# Patient Record
Sex: FEMALE | Race: BLACK OR AFRICAN AMERICAN | Employment: FULL TIME | URBAN - METROPOLITAN AREA
[De-identification: names, ages, dates, MRNs, and addresses within clinical notes are randomized per-mention and may not be internally consistent; named-entity substitution may affect disease eponyms.]

---

## 2017-05-12 ENCOUNTER — OFFICE VISIT (OUTPATIENT)
Dept: INTERNAL MEDICINE CLINIC | Age: 37
End: 2017-05-12

## 2017-05-12 VITALS
TEMPERATURE: 97.9 F | RESPIRATION RATE: 14 BRPM | HEIGHT: 68 IN | HEART RATE: 49 BPM | OXYGEN SATURATION: 99 % | DIASTOLIC BLOOD PRESSURE: 64 MMHG | SYSTOLIC BLOOD PRESSURE: 117 MMHG | BODY MASS INDEX: 33.86 KG/M2 | WEIGHT: 223.4 LBS

## 2017-05-12 DIAGNOSIS — M75.81 RIGHT ROTATOR CUFF TENDINITIS: Primary | ICD-10-CM

## 2017-05-12 DIAGNOSIS — M75.51 ACUTE SHOULDER BURSITIS, RIGHT: ICD-10-CM

## 2017-05-12 RX ORDER — CHOLECALCIFEROL TAB 125 MCG (5000 UNIT) 125 MCG
TAB ORAL
COMMUNITY

## 2017-05-12 NOTE — MR AVS SNAPSHOT
Visit Information Date & Time Provider Department Dept. Phone Encounter #  
 5/12/2017 10:00 AM Karissa Rendon MD Internist of [address] [id number] Follow-up Instructions Return if symptoms worsen or fail to improve. Upcoming Health Maintenance Date Due DTaP/Tdap/Td series (1 - Tdap) 8/28/2001 PAP AKA CERVICAL CYTOLOGY 8/28/2001 INFLUENZA AGE 9 TO ADULT 8/1/2017 Allergies as of 5/12/2017  Review Complete On: 5/12/2017 By: August Knox Severity Noted Reaction Type Reactions Pcn [Penicillins] High 05/12/2017    Hives, Swelling, Other (comments) Throat closes Shellfish Derived High 05/12/2017    Hives, Swelling, Other (comments) Throat closes Current Immunizations  Never Reviewed No immunizations on file. Not reviewed this visit You Were Diagnosed With   
  
 Codes Comments Right rotator cuff tendinitis    -  Primary ICD-10-CM: M75.81 ICD-9-CM: 726.10 Acute shoulder bursitis, right     ICD-10-CM: M75.51 
ICD-9-CM: 726.10 Vitals BP Pulse Temp Resp Height(growth percentile) Weight(growth percentile)  
 117/64 (BP 1 Location: Left arm, BP Patient Position: Sitting) (!) 49 97.9 °F (36.6 °C) (Oral) 14 5' 7.72\" (1.72 m) 223 lb 6.4 oz (101.3 kg) SpO2 BMI OB Status Smoking Status 99% 34.25 kg/m2 IUD Never Smoker Vitals History BMI and BSA Data Body Mass Index Body Surface Area  
 34.25 kg/m 2 2.2 m 2 Your Updated Medication List  
  
   
This list is accurate as of: 5/12/17 10:58 AM.  Always use your most recent med list.  
  
  
  
  
 cholecalciferol (VITAMIN D3) 5,000 unit Tab tablet Commonly known as:  VITAMIN D3 Take  by mouth every Wednesday. We Performed the Following REFERRAL TO ORTHOPEDICS [ILW563 Custom] Follow-up Instructions Return if symptoms worsen or fail to improve. Referral Information Referral ID Referred By Referred To  
  
 [id number] Lovely Shaffer Not Available Visits Status Start Date End Date 1 New Request 5/12/17 5/12/18 If your referral has a status of pending review or denied, additional information will be sent to support the outcome of this decision. Patient Instructions Rash: Care Instructions Your Care Instructions A rash is any irritation or inflammation of the skin. Rashes have many possible causes, including allergy, infection, illness, heat, and emotional stress. Follow-up care is a key part of your treatment and safety. Be sure to make and go to all appointments, and call your doctor if you are having problems. Its also a good idea to know your test results and keep a list of the medicines you take. How can you care for yourself at home? · Wash the area with water only. Soap can make dryness and itching worse. Pat dry. · Put cold, wet cloths on the rash to reduce itching. · Keep cool, and stay out of the sun. · Leave the rash open to the air as much of the time as possible. · Sometimes petroleum jelly (Vaseline) can help relieve the discomfort caused by a rash. A moisturizing lotion, such as Cetaphil, also may help. Calamine lotion may help for rashes caused by contact with something (such as a plant or soap) that irritated the skin. Use it 3 or 4 times a day. · If your doctor prescribed a cream, use it as directed. If your doctor prescribed medicine, take it exactly as directed. · If your rash itches so badly that it interferes with your normal activities, take an over-the-counter antihistamine, such as diphenhydramine (Benadryl) or loratadine (Claritin). Read and follow all instructions on the label. When should you call for help? Call your doctor now or seek immediate medical care if: 
· You have signs of infection, such as: 
¨ Increased pain, swelling, warmth, or redness. ¨ Red streaks leading from the area. ¨ Pus draining from the area. ¨ A fever. · You have joint pain along with the rash. Watch closely for changes in your health, and be sure to contact your doctor if: 
· Your rash is changing or getting worse. For example, call if you have pain along with the rash, the rash is spreading, or you have new blisters. · You do not get better after 1 week. Where can you learn more? Go to http://petar-regis.info/. Enter T505 in the search box to learn more about \"Rash: Care Instructions. \" Current as of: October 13, 2016 Content Version: 11.2 © 5509-5884 [employer]. Care instructions adapted under license by [employer] (which disclaims liability or warranty for this information). If you have questions about a medical condition or this instruction, always ask your healthcare professional. [place] [address] any warranty or liability for your use of this information. Patient was given Medical Advance Directive form and understand to bring it back when it is complete Health Maintenance Due Topic Date Due  
 DTaP/Tdap/Td series (1 - Tdap) 08/28/2001  PAP AKA CERVICAL CYTOLOGY  08/28/2001 Costochondritis: Care Instructions Your Care Instructions You have chest pain because the cartilage of your rib cage is inflamed. This problem is called costochondritis. This type of chest wall pain may last from days to weeks. It is not a heart problem. Sometimes costochondritis occurs with a cold or the flu, and other times the exact cause is not known. Follow-up care is a key part of your treatment and safety. Be sure to make and go to all appointments, and call your doctor if you are having problems. Its also a good idea to know your test results and keep a list of the medicines you take. How can you care for yourself at home? · Take medicines for pain and inflammation exactly as directed. ¨ If the doctor gave you a prescription medicine, take it as prescribed. ¨ If you are not taking a prescription pain medicine, ask your doctor if you can take an over-the-counter medicine. ¨ Do not take two or more pain medicines at the same time unless the doctor told you to. Many pain medicines have acetaminophen, which is Tylenol. Too much acetaminophen (Tylenol) can be harmful. · It may help to use a warm compress or heating pad (set on low) on your chest. You can also try alternating heat and ice. Put ice or a cold pack on the area for 10 to 20 minutes at a time. Put a thin cloth between the ice and your skin. · Avoid any activity that strains the chest area. As your pain gets better, you can slowly return to your normal activities. · Do not use tape, an elastic bandage, a \"rib belt,\" or anything else that restricts your chest wall motion. When should you call for help? Call 911 anytime you think you may need emergency care. For example, call if: 
· You have new or different chest pain or pressure. This may occur with: ¨ Sweating. ¨ Shortness of breath. ¨ Nausea or vomiting. ¨ Pain that spreads from the chest to the neck, jaw, or one or both shoulders or arms. ¨ Dizziness or lightheadedness. ¨ A fast or uneven pulse. After calling 911, chew 1 adult-strength aspirin. Wait for an ambulance. Do not try to drive yourself. · You have severe trouble breathing. Call your doctor now or seek immediate medical care if: 
· You have a fever or cough. · You have any trouble breathing. · Your chest pain gets worse. Watch closely for changes in your health, and be sure to contact your doctor if: 
· Your chest pain continues even though you are taking anti-inflammatory medicine. · Your chest wall pain has not improved after 5 to 7 days. Where can you learn more? Go to http://petar-regis.info/. Enter E276 in the search box to learn more about \"Costochondritis: Care Instructions. \" 
 Current as of: May 27, 2016 Content Version: 11.2 © 1450-1882 [employer]. Care instructions adapted under license by [employer] (which disclaims liability or warranty for this information). If you have questions about a medical condition or this instruction, always ask your healthcare professional. [address] any warranty or liability for your use of this information. Introducing hospitals & HEALTH SERVICES! [facility] introduces [employer] patient portal. Now you can access parts of your medical record, email your doctor's office, and request medication refills online. 1. In your internet browser, go to [email or web address]://[email or web address]. [employer] 2. Click on the First Time User? Click Here link in the Sign In box. You will see the New Member Sign Up page. 3. Enter your [employer] Access Code exactly as it appears below. You will not need to use this code after youve completed the sign-up process. If you do not sign up before the expiration date, you must request a new code. · [employer] Access Code: [email or web address] Expires: 8/10/2017 10:08 AM 
 
4. Enter the last four digits of your Social Security Number (xxxx) and Date of Birth (mm/dd/yyyy) as indicated and click Submit. You will be taken to the next sign-up page. 5. Create a [employer] ID. This will be your [employer] login ID and cannot be changed, so think of one that is secure and easy to remember. 6. Create a [employer] password. You can change your password at any time. 7. Enter your Password Reset Question and Answer. This can be used at a later time if you forget your password. 8. Enter your e-mail address. You will receive e-mail notification when new information is available in [address] 9. Click Sign Up. You can now view and download portions of your medical record. 10. Click the Download Summary menu link to download a portable copy of your medical information. If you have questions, please visit the Frequently Asked Questions section of the [employer] website. Remember, [employer] is NOT to be used for urgent needs. For medical emergencies, dial 911. Now available from your iPhone and Android! Please provide this summary of care documentation to your next provider. Your primary care clinician is listed as Mic Castellanos. If you have any questions after today's visit, please call [phone].

## 2017-05-12 NOTE — PROGRESS NOTES
INTERNISTS OF [facility] [place]:  5/12/2017, MRN: [id number]      Ankita Sierra is a 39 y.o. female and presents to clinic for Establish Care and Skin Problem (Back of right shoulder x 1 month)    Subjective:   Pt is a 46yo female with no known medical problems. 1. Right Shoulder Pain: The pt reports right shoulder pain and swelling for 1 month. Pain radiates along the mid axillary line. She has no breast masses or breast tenderness. She has no known history of breast cancer in her family. She has no history of cancer along first-degree relatives. Patient reports that pain developed after working at her job. She lifts a lot of boxes at work. There are no alleviating factors are known. This is never happened before. 2. Health Maintenance: Last one was 1.5 yrs ago. She had an abnormal PAP smear during pregnancy. She had a biopsy. She was told that subsequent PAPs were unremarkable. There are no active problems to display for this patient. Current Outpatient Prescriptions   Medication Sig Dispense Refill    cholecalciferol, VITAMIN D3, (VITAMIN D3) 5,000 unit tab tablet Take  by mouth every Wednesday. Allergies   Allergen Reactions    Pcn [Penicillins] Hives, Swelling and Other (comments)     Throat closes    Shellfish Derived Hives, Swelling and Other (comments)     Throat closes       Past Medical History:   Diagnosis Date    Anemia     Chronic pain     knees and ankles    GERD (gastroesophageal reflux disease)     during pregancy    Headache     Hemorrhoid     Hypercholesterolemia     Migraine headache     Tendinitis        History reviewed. No pertinent surgical history.     Family History   Problem Relation Age of Onset    Diabetes Mother     Hypertension Mother     Bipolar Disorder Mother     Elevated Lipids Father     COPD Maternal Grandmother     Emphysema Maternal Grandmother     No Known Problems Maternal Grandfather     Stroke Paternal Grandmother     No Known Problems Paternal Grandfather     Allergic Rhinitis Daughter     Eczema Daughter     Headache Daughter        Social History   Substance Use Topics    Smoking status: Never Smoker    Smokeless tobacco: Never Used    Alcohol use Yes      Comment: rare       ROS   Review of Systems   Constitutional: Negative for chills and fever. HENT: Negative for ear pain and sore throat. Eyes: Negative for blurred vision and pain. Respiratory: Negative for cough. Cardiovascular: Negative for chest pain. Gastrointestinal: Negative for abdominal pain. Genitourinary: Negative for dysuria. Musculoskeletal: Positive for joint pain. Negative for myalgias. Skin: Negative for rash. Neurological: Negative for tingling, focal weakness and headaches. Endo/Heme/Allergies: Does not bruise/bleed easily. Objective     Vitals:    05/12/17 1012   BP: 117/64   Pulse: (!) 49   Resp: 14   Temp: 97.9 °F (36.6 °C)   TempSrc: Oral   SpO2: 99%   Weight: 223 lb 6.4 oz (101.3 kg)   Height: 5' 7.72\" (1.72 m)   PainSc:   0 - No pain       Physical Exam   Constitutional: She is oriented to person, place, and time and well-developed, well-nourished, and in no distress. HENT:   Head: Normocephalic and atraumatic. Right Ear: External ear normal.   Left Ear: External ear normal.   Nose: Nose normal.   Mouth/Throat: Oropharynx is clear and moist. No oropharyngeal exudate. Eyes: Conjunctivae and EOM are normal. Pupils are equal, round, and reactive to light. Right eye exhibits no discharge. Left eye exhibits no discharge. No scleral icterus. Neck: Neck supple. Cardiovascular: Normal rate, regular rhythm, normal heart sounds and intact distal pulses. Exam reveals no gallop and no friction rub. No murmur heard. Pulmonary/Chest: Effort normal and breath sounds normal. No respiratory distress. She has no wheezes. She has no rales. Abdominal: Soft. Bowel sounds are normal. She exhibits no distension. There is no tenderness.  There is no rebound and no guarding. Musculoskeletal: She exhibits no edema or tenderness (BUE are NTTP except along her right shoulder bursa - which is swollen along her superior posterior shoulder area). She has decreased range of motion with exercises just with her right shoulder. Lymphadenopathy:     She has no cervical adenopathy. Neurological: She is alert and oriented to person, place, and time. She exhibits normal muscle tone. Gait normal.   Skin: Skin is warm and dry. No erythema. Psychiatric: Affect normal.   Nursing note and vitals reviewed. Assessment/Plan:   1. Right rotator cuff tendinitis:   A referral was placed to orthopedics for evaluation of right rotator cuff tendinitis and suspected bursitis. The patient can take an over-the-counter NSAID for 7 days. She can also take Tylenol as needed for pain. Activity as tolerated. ORDERS:  - REFERRAL TO ORTHOPEDICS    2. Health Maintenance:   We are requesting her most recent Pap smear, previous PCP records, and her vaccination records      Health Maintenance Due   Topic Date Due    DTaP/Tdap/Td series (1 - Tdap) 08/28/2001    PAP AKA CERVICAL CYTOLOGY  08/28/2001         Lab review: no lab studies available for review at time of visit    I have discussed the diagnosis with the patient and the intended plan as seen in the above orders. The patient has received an after-visit summary and questions were answered concerning future plans. I have discussed medication side effects and warnings with the patient as well. I have reviewed the plan of care with the patient, accepted their input and they are in agreement with the treatment goals. All questions were answered. The patient understands the plan of care. Handouts provided today with above information. Pt instructed if symptoms worsen to call the office or report to the ED for continued care. Greater than 50% of the visit time was spent in counseling and/or coordination of care. Follow-up Disposition:  Return if symptoms worsen or fail to improve.     Joe Romo MD

## 2017-05-12 NOTE — PATIENT INSTRUCTIONS
Rash: Care Instructions  Your Care Instructions  A rash is any irritation or inflammation of the skin. Rashes have many possible causes, including allergy, infection, illness, heat, and emotional stress. Follow-up care is a key part of your treatment and safety. Be sure to make and go to all appointments, and call your doctor if you are having problems. Its also a good idea to know your test results and keep a list of the medicines you take. How can you care for yourself at home? · Wash the area with water only. Soap can make dryness and itching worse. Pat dry. · Put cold, wet cloths on the rash to reduce itching. · Keep cool, and stay out of the sun. · Leave the rash open to the air as much of the time as possible. · Sometimes petroleum jelly (Vaseline) can help relieve the discomfort caused by a rash. A moisturizing lotion, such as Cetaphil, also may help. Calamine lotion may help for rashes caused by contact with something (such as a plant or soap) that irritated the skin. Use it 3 or 4 times a day. · If your doctor prescribed a cream, use it as directed. If your doctor prescribed medicine, take it exactly as directed. · If your rash itches so badly that it interferes with your normal activities, take an over-the-counter antihistamine, such as diphenhydramine (Benadryl) or loratadine (Claritin). Read and follow all instructions on the label. When should you call for help? Call your doctor now or seek immediate medical care if:  · You have signs of infection, such as:  ¨ Increased pain, swelling, warmth, or redness. ¨ Red streaks leading from the area. ¨ Pus draining from the area. ¨ A fever. · You have joint pain along with the rash. Watch closely for changes in your health, and be sure to contact your doctor if:  · Your rash is changing or getting worse. For example, call if you have pain along with the rash, the rash is spreading, or you have new blisters.   · You do not get better after 1 week.  Where can you learn more? Go to http://petar-regis.info/. Enter B328 in the search box to learn more about \"Rash: Care Instructions. \"  Current as of: October 13, 2016  Content Version: 11.2  © 6600-7466 [employer]. Care instructions adapted under license by [employer] (which disclaims liability or warranty for this information). If you have questions about a medical condition or this instruction, always ask your healthcare professional. [place] [address] any warranty or liability for your use of this information. Patient was given Medical Advance Directive form and understand to bring it back when it is complete  Health Maintenance Due   Topic Date Due    DTaP/Tdap/Td series (1 - Tdap) 08/28/2001    PAP AKA CERVICAL CYTOLOGY  08/28/2001          Costochondritis: Care Instructions  Your Care Instructions  You have chest pain because the cartilage of your rib cage is inflamed. This problem is called costochondritis. This type of chest wall pain may last from days to weeks. It is not a heart problem. Sometimes costochondritis occurs with a cold or the flu, and other times the exact cause is not known. Follow-up care is a key part of your treatment and safety. Be sure to make and go to all appointments, and call your doctor if you are having problems. Its also a good idea to know your test results and keep a list of the medicines you take. How can you care for yourself at home? · Take medicines for pain and inflammation exactly as directed. ¨ If the doctor gave you a prescription medicine, take it as prescribed. ¨ If you are not taking a prescription pain medicine, ask your doctor if you can take an over-the-counter medicine. ¨ Do not take two or more pain medicines at the same time unless the doctor told you to. Many pain medicines have acetaminophen, which is Tylenol. Too much acetaminophen (Tylenol) can be harmful.   · It may help to use a warm compress or heating pad (set on low) on your chest. You can also try alternating heat and ice. Put ice or a cold pack on the area for 10 to 20 minutes at a time. Put a thin cloth between the ice and your skin. · Avoid any activity that strains the chest area. As your pain gets better, you can slowly return to your normal activities. · Do not use tape, an elastic bandage, a \"rib belt,\" or anything else that restricts your chest wall motion. When should you call for help? Call 911 anytime you think you may need emergency care. For example, call if:  · You have new or different chest pain or pressure. This may occur with:  ¨ Sweating. ¨ Shortness of breath. ¨ Nausea or vomiting. ¨ Pain that spreads from the chest to the neck, jaw, or one or both shoulders or arms. ¨ Dizziness or lightheadedness. ¨ A fast or uneven pulse. After calling 911, chew 1 adult-strength aspirin. Wait for an ambulance. Do not try to drive yourself. · You have severe trouble breathing. Call your doctor now or seek immediate medical care if:  · You have a fever or cough. · You have any trouble breathing. · Your chest pain gets worse. Watch closely for changes in your health, and be sure to contact your doctor if:  · Your chest pain continues even though you are taking anti-inflammatory medicine. · Your chest wall pain has not improved after 5 to 7 days. Where can you learn more? Go to http://petar-regis.info/. Enter E638 in the search box to learn more about \"Costochondritis: Care Instructions. \"  Current as of: May 27, 2016  Content Version: 11.2  © 2718-8100 [employer]. Care instructions adapted under license by [employer] (which disclaims liability or warranty for this information).  If you have questions about a medical condition or this instruction, always ask your healthcare professional. [address]  any warranty or liability for your use of this information.

## 2017-05-12 NOTE — PROGRESS NOTES
Chief Complaint   Patient presents with   [facility] Care    Skin Problem     Back of right shoulder x 1 month     1. Have you been to the ER, urgent care clinic since your last visit? Hospitalized since your last visit? Yes When: 5217 Where: [facility] Reason: Sliced left thumb    2. Have you seen or consulted any other health care providers outside of the [address] [place] [address] since your last visit? Include any pap smears or colon screening.  No    Patient was given Medical Advance Directive form and understand to bring it back when it is complete

## 2017-05-19 PROBLEM — Z87.42 HISTORY OF ABNORMAL CERVICAL PAP SMEAR: Status: ACTIVE | Noted: 2017-05-19

## 2017-06-07 ENCOUNTER — OFFICE VISIT (OUTPATIENT)
Dept: ORTHOPEDIC SURGERY | Age: 37
End: 2017-06-07

## 2017-06-07 VITALS
HEIGHT: 68 IN | WEIGHT: 217.4 LBS | TEMPERATURE: 97.2 F | BODY MASS INDEX: 32.95 KG/M2 | SYSTOLIC BLOOD PRESSURE: 116 MMHG | DIASTOLIC BLOOD PRESSURE: 78 MMHG | HEART RATE: 60 BPM

## 2017-06-07 DIAGNOSIS — M25.511 RIGHT SHOULDER PAIN, UNSPECIFIED CHRONICITY: ICD-10-CM

## 2017-06-07 DIAGNOSIS — D17.21 LIPOMA OF RIGHT SHOULDER: Primary | ICD-10-CM

## 2017-06-07 NOTE — PROGRESS NOTES
Maryellen Chin  1980   Chief Complaint   Patient presents with    Shoulder Pain     Right        HISTORY OF PRESENT ILLNESS  Maryellen Chin is a 39 y.o. female who presents today for evaluation of right shoulder pain. She rates her pain 4/10 today. Patient reports that she noticed a bump in her right shoulder last week. She works at [employer] and has to do a lot of heavy lifting throughout the day. Allergies   Allergen Reactions    Pcn [Penicillins] Hives, Swelling and Other (comments)     Throat closes    Shellfish Derived Hives, Swelling and Other (comments)     Throat closes        Past Medical History:   Diagnosis Date    Anemia     Chronic pain     knees and ankles    GERD (gastroesophageal reflux disease)     during pregancy    Headache     Hemorrhoid     Hypercholesterolemia     Migraine headache     Tendinitis       Social History     Social History    Marital status: UNKNOWN     Spouse name: N/A    Number of children: N/A    Years of education: N/A     Occupational History    Not on file. Social History Main Topics    Smoking status: Never Smoker    Smokeless tobacco: Never Used    Alcohol use Yes      Comment: rare    Drug use: Not on file    Sexual activity: Not on file     Other Topics Concern    Not on file     Social History Narrative      History reviewed. No pertinent surgical history. Family History   Problem Relation Age of Onset    Diabetes Mother     Hypertension Mother     Bipolar Disorder Mother     Elevated Lipids Father     COPD Maternal Grandmother     Emphysema Maternal Grandmother     No Known Problems Maternal Grandfather     Stroke Paternal Grandmother     No Known Problems Paternal Grandfather     Allergic Rhinitis Daughter     Eczema Daughter     Headache Daughter       Current Outpatient Prescriptions   Medication Sig    cholecalciferol, VITAMIN D3, (VITAMIN D3) 5,000 unit tab tablet Take  by mouth every Wednesday.      No current facility-administered medications for this visit. REVIEW OF SYSTEM   Patient denies: Weight loss, Fever/Chills, HA, Visual changes, Fatigue, Chest pain, SOB, Abdominal pain, N/V/D/C, Blood in stool or urine, Edema. Pertinent positive as above in HPI. All others were negative    PHYSICAL EXAM:   Visit Vitals    /78    Pulse 60    Temp 97.2 °F (36.2 °C) (Oral)    Ht 5' 8\" (1.727 m)    Wt 217 lb 6.4 oz (98.6 kg)    BMI 33.06 kg/m2     The patient is a well-developed, well-nourished female   in no acute distress. The patient is alert and oriented times three. The patient is alert and oriented times three. Mood and affect are normal.  LYMPHATIC: lymph nodes are not enlarged and are within normal limits  SKIN: normal in color and non tender to palpation. There are no bruises or abrasions noted. NEUROLOGICAL: Motor sensory exam is within normal limits. Reflexes are equal bilaterally. There is normal sensation to pinprick and light touch  MUSCULOSKELETAL:  Examination Right shoulder   Skin Intact   AC joint tenderness -   Biceps tenderness -   Forward flexion/Elevation    Active abduction    Glenohumeral abduction 90   External rotation ROM 90   Internal rotation ROM 70   Apprehension -   Lilianas Relocation -   Jerk -   Load and Shift -   Obriens -   Speeds -   Impingement sign -   Supraspinatus/Empty Can -, 5/5   External Rotation Strength -, 5/5   Lift Off/Belly Press -, 5/5   Neurovascular Intact   Soft tissue mass seen on ultrasound, solid, no fluid    IMAGING: XR of the right shoulder dated 6/7/17 was reviewed and read: No soft tissue calcification noted. IMPRESSION:      ICD-10-CM ICD-9-CM    1. Lipoma of right shoulder D17.21 214.8 MRI SHOULDER RT WO CONT      US EXT NONVAS RT COMP   2.  Right shoulder pain, unspecified chronicity M25.511 719.41 AMB POC XRAY, SHOULDER; COMPLETE, 2+      MRI SHOULDER RT WO CONT      US EXT NONVAS RT COMP        PLAN: Patient is complaining of a cyst over the right shoulder. I examined this mass under ultrasound. We will proceed with an MRI of the right shoulder to r/o a lipoma. I will see her back following completion of the MRI. Follow-up Disposition: Not on File    Scribed by Chio Salazar [facility]) as dictated by Paulette Ryan MD    I, Dr. Paulette Ryan, confirm that all documentation is accurate.     Paulette Ryan M.D.   [facility]

## 2017-06-07 NOTE — PATIENT INSTRUCTIONS
Joint Pain: Care Instructions  Your Care Instructions  Many people have small aches and pains from overuse or injury to muscles and joints. Joint injuries often happen during sports or recreation, work tasks, or projects around the home. An overuse injury can happen when you put too much stress on a joint or when you do an activity that stresses the joint over and over, such as using the computer or rowing a boat. You can take action at home to help your muscles and joints get better. You should feel better in 1 to 2 weeks, but it can take 3 months or more to heal completely. Follow-up care is a key part of your treatment and safety. Be sure to make and go to all appointments, and call your doctor if you are having problems. It's also a good idea to know your test results and keep a list of the medicines you take. How can you care for yourself at home? · Do not put weight on the injured joint for at least a day or two. · For the first day or two after an injury, do not take hot showers or baths, and do not use hot packs. The heat could make swelling worse. · Put ice or a cold pack on the sore joint for 10 to 20 minutes at a time. Try to do this every 1 to 2 hours for the next 3 days (when you are awake) or until the swelling goes down. Put a thin cloth between the ice and your skin. · Wrap the injury in an elastic bandage. Do not wrap it too tightly because this can cause more swelling. · Prop up the sore joint on a pillow when you ice it or anytime you sit or lie down during the next 3 days. Try to keep it above the level of your heart. This will help reduce swelling. · Take an over-the-counter pain medicine, such as acetaminophen (Tylenol), ibuprofen (Advil, Motrin), or naproxen (Aleve). Read and follow all instructions on the label. · After 1 or 2 days of rest, begin moving the joint gently.  While the joint is still healing, you can begin to exercise using activities that do not strain or hurt the painful joint. When should you call for help? Call your doctor now or seek immediate medical care if:  · You have signs of infection, such as:  ¨ Increased pain, swelling, warmth, and redness. ¨ Red streaks leading from the joint. ¨ A fever. Watch closely for changes in your health, and be sure to contact your doctor if:  · Your movement or symptoms are not getting better after 1 to 2 weeks of home treatment. Where can you learn more? Go to http://petar-regis.info/. Enter P205 in the search box to learn more about \"Joint Pain: Care Instructions. \"  Current as of: May 23, 2016  Content Version: 11.2  © 6821-0229 [employer]. Care instructions adapted under license by [employer] (which disclaims liability or warranty for this information). If you have questions about a medical condition or this instruction, always ask your healthcare professional. [address] any warranty or liability for your use of this information.

## 2017-06-11 PROBLEM — K75.81 NASH (NONALCOHOLIC STEATOHEPATITIS): Status: ACTIVE | Noted: 2017-06-11

## 2018-04-26 ENCOUNTER — HOSPITAL ENCOUNTER (EMERGENCY)
Age: 38
Discharge: HOME | End: 2018-04-26
Attending: EMERGENCY MEDICINE
Payer: COMMERCIAL

## 2018-04-26 VITALS
RESPIRATION RATE: 18 BRPM | OXYGEN SATURATION: 98 % | DIASTOLIC BLOOD PRESSURE: 65 MMHG | BODY MASS INDEX: 32.58 KG/M2 | WEIGHT: 215 LBS | HEIGHT: 68 IN | HEART RATE: 73 BPM | SYSTOLIC BLOOD PRESSURE: 104 MMHG | TEMPERATURE: 98.2 F

## 2018-04-26 DIAGNOSIS — M25.571 RIGHT ANKLE PAIN, UNSPECIFIED CHRONICITY: Primary | ICD-10-CM

## 2018-04-26 LAB — D DIMER PPP FEU-MCNC: 0.33 UG/ML(FEU)

## 2018-04-26 PROCEDURE — 99283 EMERGENCY DEPT VISIT LOW MDM: CPT

## 2018-04-26 PROCEDURE — 85379 FIBRIN DEGRADATION QUANT: CPT

## 2018-04-26 RX ORDER — NAPROXEN 500 MG/1
500 TABLET ORAL 2 TIMES DAILY WITH MEALS
Qty: 14 TAB | Refills: 0 | Status: SHIPPED | OUTPATIENT
Start: 2018-04-26 | End: 2018-09-04 | Stop reason: SDUPTHER

## 2018-04-26 NOTE — DISCHARGE INSTRUCTIONS
Joint Pain: Care Instructions  Your Care Instructions    Many people have small aches and pains from overuse or injury to muscles and joints. Joint injuries often happen during sports or recreation, work tasks, or projects around the home. An overuse injury can happen when you put too much stress on a joint or when you do an activity that stresses the joint over and over, such as using the computer or rowing a boat. You can take action at home to help your muscles and joints get better. You should feel better in 1 to 2 weeks, but it can take 3 months or more to heal completely. Follow-up care is a key part of your treatment and safety. Be sure to make and go to all appointments, and call your doctor if you are having problems. It's also a good idea to know your test results and keep a list of the medicines you take. How can you care for yourself at home? · Do not put weight on the injured joint for at least a day or two. · For the first day or two after an injury, do not take hot showers or baths, and do not use hot packs. The heat could make swelling worse. · Put ice or a cold pack on the sore joint for 10 to 20 minutes at a time. Try to do this every 1 to 2 hours for the next 3 days (when you are awake) or until the swelling goes down. Put a thin cloth between the ice and your skin. · Wrap the injury in an elastic bandage. Do not wrap it too tightly because this can cause more swelling. · Prop up the sore joint on a pillow when you ice it or anytime you sit or lie down during the next 3 days. Try to keep it above the level of your heart. This will help reduce swelling. · Take an over-the-counter pain medicine, such as acetaminophen (Tylenol), ibuprofen (Advil, Motrin), or naproxen (Aleve). Read and follow all instructions on the label. · After 1 or 2 days of rest, begin moving the joint gently.  While the joint is still healing, you can begin to exercise using activities that do not strain or hurt the painful joint. When should you call for help? Call your doctor now or seek immediate medical care if:  ? · You have signs of infection, such as:  ¨ Increased pain, swelling, warmth, and redness. ¨ Red streaks leading from the joint. ¨ A fever. ? Watch closely for changes in your health, and be sure to contact your doctor if:  ? · Your movement or symptoms are not getting better after 1 to 2 weeks of home treatment. Where can you learn more? Go to http://petar-regis.info/. Enter P205 in the search box to learn more about \"Joint Pain: Care Instructions. \"  Current as of: 2017  Content Version: 11.4  © 8242-5460 [employer]. Care instructions adapted under license by [employer] (which disclaims liability or warranty for this information). If you have questions about a medical condition or this instruction, always ask your healthcare professional. [place] [address] any warranty or liability for your use of this information. Oc[employer]ch Activation    Thank you for requesting access to [employer]. Please follow the instructions below to securely access and download your online medical record. O[employer] allows you to send messages to your doctor, view your test results, renew your prescriptions, schedule appointments, and more. How Do I Sign Up? 1. In your internet browser, go to [email or web address]  2. Click on the First Time User? Click Here link in the Sign In box. You will be redirect to the New Member Sign Up page. 3. Enter your O[employer] Access Code exactly as it appears below. You will not need to use this code after youve completed the sign-up process. If you do not sign up before the expiration date, you must request a new code. Oc[employer]ch Access Code: [email or web address]  Expires: 2018  5:47 PM (This is the date your O[employer] access code will )    4.  Enter the last four digits of your Social Security Number (xxxx) and Date of Birth (mm/dd/yyyy) as indicated and click Submit. You will be taken to the next sign-up page. 5. Create a Base[employer] ID. This will be your [employer] login ID and cannot be changed, so think of one that is secure and easy to remember. 6. Create a [employer] password. You can change your password at any time. 7. Enter your Password Reset Question and Answer. This can be used at a later time if you forget your password. 8. Enter your e-mail address. You will receive e-mail notification when new information is available in [address] 9. Click Sign Up. You can now view and download portions of your medical record. 10. Click the Download Summary menu link to download a portable copy of your medical information. Additional Information    If you have questions, please visit the Frequently Asked Questions section of the [employer] website at [email or web address] Remember, [employer] is NOT to be used for urgent needs. For medical emergencies, dial 911.

## 2018-04-26 NOTE — ED PROVIDER NOTES
EMERGENCY DEPARTMENT HISTORY AND PHYSICAL EXAM    6:41 PM      Date: 4/26/2018  Patient Name: Aura Campbell    History of Presenting Illness     Chief Complaint   Patient presents with    Foot Injury         History Provided By: Patient    Chief Complaint: was seen at the [facility] 3 weeks ago for similar R ankle pain and swelling and \" they did a CT scan on me an told I have a torn tendon and gave me referral to see a [place] orthopedic doctor which I will be seeing later next month\"   Is here today because noticed swelling but no chest pain, SOB, fever, chills, palpitation of hx of prior blood clot. Currently has IUD. Duration:  Weeks  Timing:  Gradual  Location: R ankle  Quality: Aching and swelling  Severity: Moderate  Modifying Factors: none  Associated Symptoms: denies any other associated signs or symptoms      Additional History (Context): Aura Campbell is a 40 y.o. female with No significant past medical history who presents with sx as noted above. PCP: Amada Pittman MD    Current Outpatient Prescriptions   Medication Sig Dispense Refill    naproxen (NAPROSYN) 500 mg tablet Take 1 Tab by mouth two (2) times daily (with meals). 14 Tab 0    cholecalciferol, VITAMIN D3, (VITAMIN D3) 5,000 unit tab tablet Take  by mouth every Wednesday. Past History     Past Medical History:  Past Medical History:   Diagnosis Date    Anemia     Chronic pain     knees and ankles    GERD (gastroesophageal reflux disease)     during pregancy    Headache     Hemorrhoid     Hypercholesterolemia     Migraine headache     SIFUENTES (nonalcoholic steatohepatitis) (per outside records) 6/11/2017    Tendinitis        Past Surgical History:  No past surgical history on file.     Family History:  Family History   Problem Relation Age of Onset    Diabetes Mother     Hypertension Mother     Bipolar Disorder Mother     Elevated Lipids Father     COPD Maternal Grandmother     Emphysema Maternal Grandmother     No Known Problems Maternal Grandfather     Stroke Paternal Grandmother     No Known Problems Paternal Grandfather     Allergic Rhinitis Daughter     Eczema Daughter     Headache Daughter        Social History:  Social History   Substance Use Topics    Smoking status: Never Smoker    Smokeless tobacco: Never Used    Alcohol use Yes      Comment: rare       Allergies: Allergies   Allergen Reactions    Pcn [Penicillins] Hives, Swelling and Other (comments)     Throat closes    Shellfish Derived Hives, Swelling and Other (comments)     Throat closes         Review of Systems       Review of Systems   Respiratory: Negative for chest tightness and shortness of breath. Cardiovascular: Negative for chest pain. Musculoskeletal:        L ankle pain and swelling x 3 weeks. All other systems reviewed and are negative. Physical Exam     Visit Vitals    /81 (BP 1 Location: Left arm)    Pulse 64    Temp 98.2 °F (36.8 °C)    Resp 18    Ht 5' 8\" (1.727 m)    Wt 97.5 kg (215 lb)    SpO2 99%    BMI 32.69 kg/m2         Physical Exam   Constitutional: She is oriented to person, place, and time. She appears well-developed and well-nourished. HENT:   Head: Normocephalic and atraumatic. Eyes: Conjunctivae and EOM are normal.   Neck: Normal range of motion. Cardiovascular: Normal rate, regular rhythm and normal heart sounds. Pulmonary/Chest: Effort normal and breath sounds normal. No respiratory distress. She has no wheezes. She has no rales. She exhibits no tenderness. Abdominal: Soft. Bowel sounds are normal. She exhibits no distension. There is no tenderness. There is no rebound and no guarding. Musculoskeletal: Normal range of motion. Legs:  Neurological: She is alert and oriented to person, place, and time. Skin: Skin is warm. She is not diaphoretic. Psychiatric: She has a normal mood and affect.  Her behavior is normal. Judgment and thought content normal.         Diagnostic Study Results     Labs -  Recent Results (from the past 12 hour(s))   D DIMER    Collection Time: 04/26/18  6:18 PM   Result Value Ref Range    D DIMER 0.33 <0.46 ug/ml(FEU)     Radiologic Studies -   No orders to display         Medical Decision Making   I am the first provider for this patient. I reviewed the vital signs, available nursing notes, past medical history, past surgical history, family history and social history. Vital Signs-Reviewed the patient's vital signs. Provider Notes (Medical Decision Making): With hx of injury and being on IUD has risk factor for clot so d dimer was ordered. Negative. Placed on crutches and no imaging ordered since already had CT at [facility].   Will give referral to see orthopedic civilian. Given Crutches. Discharge. Spoke to patient and mother and explained the treatment plan and the test done. Diagnosis     Clinical Impression:   1. Right ankle pain, unspecified chronicity        Disposition: HOME. Follow-up Information     Follow up With Details Comments Wen Barbour. 60., MD In 1 day  [place] [address]  [phone] [address] [facility] EMERGENCY DEPT  As needed [address]    Shayan Membreno MD Schedule an appointment as soon as possible for a visit  [address] [place] [id number] [phone]             Patient's Medications   Start Taking    NAPROXEN (NAPROSYN) 500 MG TABLET    Take 1 Tab by mouth two (2) times daily (with meals). Continue Taking    CHOLECALCIFEROL, VITAMIN D3, (VITAMIN D3) 5,000 UNIT TAB TABLET    Take  by mouth every Wednesday.    These Medications have changed    No medications on file   Stop Taking    No medications on file

## 2018-04-26 NOTE — LETTER
NOTIFICATION RETURN TO WORK / SCHOOL 
 
4/26/2018 7:15 PM 
 
Ms. Ashley Yoo [address] [place] [address] To Whom It May Concern: 
 
Ashley Yoo is currently under the care of [address] [facility] EMERGENCY DEPT. She will return to work/school on: Saturday, April 28, 2018. Must use crutches until cleared by orthopedist.  
 
If there are questions or concerns please have the patient contact our office. Sincerely, Alcides MARINO/ ESTELLE Jarrett

## 2018-05-15 ENCOUNTER — OFFICE VISIT (OUTPATIENT)
Dept: ORTHOPEDIC SURGERY | Age: 38
End: 2018-05-15

## 2018-05-15 VITALS
RESPIRATION RATE: 16 BRPM | DIASTOLIC BLOOD PRESSURE: 82 MMHG | HEIGHT: 68 IN | OXYGEN SATURATION: 100 % | TEMPERATURE: 98.2 F | WEIGHT: 228.9 LBS | HEART RATE: 62 BPM | SYSTOLIC BLOOD PRESSURE: 121 MMHG | BODY MASS INDEX: 34.69 KG/M2

## 2018-05-15 DIAGNOSIS — Z01.810 PRE-OPERATIVE CARDIOVASCULAR EXAMINATION: ICD-10-CM

## 2018-05-15 DIAGNOSIS — Z01.811 PRE-OPERATIVE RESPIRATORY EXAMINATION: ICD-10-CM

## 2018-05-15 DIAGNOSIS — S86.311A TEAR OF PERONEAL TENDON, RIGHT, INITIAL ENCOUNTER: Primary | ICD-10-CM

## 2018-05-15 DIAGNOSIS — Z01.812 BLOOD TESTS PRIOR TO TREATMENT OR PROCEDURE: ICD-10-CM

## 2018-05-15 DIAGNOSIS — Z01.818 PREOP EXAMINATION: ICD-10-CM

## 2018-05-15 DIAGNOSIS — M76.71 TENDINITIS OF RIGHT PERONEUS BREVIS TENDON: ICD-10-CM

## 2018-05-15 RX ORDER — BISMUTH SUBSALICYLATE 262 MG
1 TABLET,CHEWABLE ORAL DAILY
COMMUNITY

## 2018-05-15 NOTE — LETTER
NOTIFICATION RETURN TO WORK / SCHOOL 
 
5/15/2018 9:02 AM 
 
Ms. Alejandro Dickerson [address] [address] To Whom It May Concern: 
 
Alejandro Dickerson is currently under the care of [address] [place] [address] Elvis Veronica. Please allow Ms. Bautista to remain out of work until further notice. She is under our care for peroneal tendinitis, surgery pending. If there are questions or concerns please have the patient contact our office.  
 
 
 
Sincerely, 
 
 
Jolene Abdalla MD

## 2018-05-15 NOTE — PATIENT INSTRUCTIONS
Please follow up with Xi Abbott. You are advised to contact us if your condition worsens. Plantar Fasciitis: Exercises  Your Care Instructions  Here are some examples of typical rehabilitation exercises for your condition. Start each exercise slowly. Ease off the exercise if you start to have pain. Your doctor or physical therapist will tell you when you can start these exercises and which ones will work best for you. How to do the exercises  Towel stretch    1. Sit with your legs extended and knees straight. 2. Place a towel around your foot just under the toes. 3. Hold each end of the towel in each hand, with your hands above your knees. 4. Pull back with the towel so that your foot stretches toward you. 5. Hold the position for at least 15 to 30 seconds. 6. Repeat 2 to 4 times a session, up to 5 sessions a day. Calf stretch    This exercise stretches the muscles at the back of the lower leg (the calf) and the Achilles tendon. Do this exercise 3 or 4 times a day, 5 days a week. 1. Stand facing a wall with your hands on the wall at about eye level. Put the leg you want to stretch about a step behind your other leg. 2. Keeping your back heel on the floor, bend your front knee until you feel a stretch in the back leg. 3. Hold the stretch for 15 to 30 seconds. Repeat 2 to 4 times. Plantar fascia and calf stretch    Stretching the plantar fascia and calf muscles can increase flexibility and decrease heel pain. You can do this exercise several times each day and before and after activity. 1. Stand on a step as shown above. Be sure to hold on to the banister. 2. Slowly let your heels down over the edge of the step as you relax your calf muscles. You should feel a gentle stretch across the bottom of your foot and up the back of your leg to your knee. 3. Hold the stretch about 15 to 30 seconds, and then tighten your calf muscle a little to bring your heel back up to the level of the step.  Repeat 2 to 4 times. Towel curls    Make this exercise more challenging by placing a weighted object, such as a soup can, on the other end of the towel. 1. While sitting, place your foot on a towel on the floor and scrunch the towel toward you with your toes. 2. Then, also using your toes, push the towel away from you. Welch pickups    1. Put marbles on the floor next to a cup.  2. Using your toes, try to lift the marbles up from the floor and put them in the cup. Follow-up care is a key part of your treatment and safety. Be sure to make and go to all appointments, and call your doctor if you are having problems. It's also a good idea to know your test results and keep a list of the medicines you take. Where can you learn more? Go to http://petar-regis.info/. Doc Alvarado in the search box to learn more about \"Plantar Fasciitis: Exercises. \"  Current as of: March 21, 2017  Content Version: 11.4  © 5908-2664 [employer]wise, Inc[employer]. Care instructions adapted under license by [employer] (which disclaims liability or warranty for this information). If you have questions about a medical condition or this instruction, always ask your healthcare professional. [address] any warranty or liability for your use of this information.

## 2018-05-15 NOTE — MR AVS SNAPSHOT
[address] [place] [address], [address] 200 [facility] 
[phone] Patient: Taylor Rodriguez MRN: [id number] VQQ:[id number] Visit Information Date & Time Provider Department Dept. Phone Encounter #  
 5/15/2018  8:30 AM Kimporsha Frye, [address] [facility] [phone] [id number] Upcoming Health Maintenance Date Due DTaP/Tdap/Td series (1 - Tdap) 8/28/2001 PAP AKA CERVICAL CYTOLOGY 8/28/2001 Influenza Age 5 to Adult 8/1/2018 Allergies as of 5/15/2018  Review Complete On: 4/26/2018 By: Diego Becker RN Severity Noted Reaction Type Reactions Pcn [Penicillins] High 05/12/2017    Hives, Swelling, Other (comments) Throat closes Shellfish Derived High 05/12/2017    Hives, Swelling, Other (comments) Throat closes Current Immunizations  Never Reviewed No immunizations on file. Not reviewed this visit You Were Diagnosed With   
  
 Codes Comments Pre-operative cardiovascular examination    -  Primary ICD-10-CM: Z01.810 ICD-9-CM: V72.81 Pre-operative respiratory examination     ICD-10-CM: O49.850 ICD-9-CM: V72.82 Blood tests prior to treatment or procedure     ICD-10-CM: Z01.812 ICD-9-CM: V72.63 Preop examination     ICD-10-CM: R26.667 ICD-9-CM: V72.84 Vitals BP Pulse Temp Resp Height(growth percentile) Weight(growth percentile) 121/82 62 98.2 °F (36.8 °C) (Oral) 16 5' 8\" (1.727 m) 228 lb 14.4 oz (103.8 kg) SpO2 BMI OB Status Smoking Status 100% 34.8 kg/m2 IUD Never Smoker BMI and BSA Data Body Mass Index Body Surface Area 34.8 kg/m 2 2.23 m 2 Your Updated Medication List  
  
   
This list is accurate as of 5/15/18  9:04 AM.  Always use your most recent med list.  
  
  
  
  
 cholecalciferol (VITAMIN D3) 5,000 unit Tab tablet Commonly known as:  VITAMIN D3 Take  by mouth every Wednesday. multivitamin tablet Commonly known as:  ONE A DAY Take 1 Tab by mouth daily. naproxen 500 mg tablet Commonly known as:  NAPROSYN Take 1 Tab by mouth two (2) times daily (with meals). Patient Instructions Please follow up with Mesfin Esqueda. You are advised to contact us if your condition worsens. Plantar Fasciitis: Exercises Your Care Instructions Here are some examples of typical rehabilitation exercises for your condition. Start each exercise slowly. Ease off the exercise if you start to have pain. Your doctor or physical therapist will tell you when you can start these exercises and which ones will work best for you. How to do the exercises Towel stretch 1. Sit with your legs extended and knees straight. 2. Place a towel around your foot just under the toes. 3. Hold each end of the towel in each hand, with your hands above your knees. 4. Pull back with the towel so that your foot stretches toward you. 5. Hold the position for at least 15 to 30 seconds. 6. Repeat 2 to 4 times a session, up to 5 sessions a day. Calf stretch This exercise stretches the muscles at the back of the lower leg (the calf) and the Achilles tendon. Do this exercise 3 or 4 times a day, 5 days a week. 1. Stand facing a wall with your hands on the wall at about eye level. Put the leg you want to stretch about a step behind your other leg. 2. Keeping your back heel on the floor, bend your front knee until you feel a stretch in the back leg. 3. Hold the stretch for 15 to 30 seconds. Repeat 2 to 4 times. Plantar fascia and calf stretch Stretching the plantar fascia and calf muscles can increase flexibility and decrease heel pain. You can do this exercise several times each day and before and after activity. 1. Stand on a step as shown above. Be sure to hold on to the banister.  
2. Slowly let your heels down over the edge of the step as you relax your calf muscles. You should feel a gentle stretch across the bottom of your foot and up the back of your leg to your knee. 3. Hold the stretch about 15 to 30 seconds, and then tighten your calf muscle a little to bring your heel back up to the level of the step. Repeat 2 to 4 times. Towel curls Make this exercise more challenging by placing a weighted object, such as a soup can, on the other end of the towel. 1. While sitting, place your foot on a towel on the floor and scrunch the towel toward you with your toes. 2. Then, also using your toes, push the towel away from you. Merkel pickups 1. Put marbles on the floor next to a cup. 
2. Using your toes, try to lift the marbles up from the floor and put them in the cup. Follow-up care is a key part of your treatment and safety. Be sure to make and go to all appointments, and call your doctor if you are having problems. It's also a good idea to know your test results and keep a list of the medicines you take. Where can you learn more? Go to http://[employer] naturalregis.info/. Cheyennealexandra Abimbola in the search box to learn more about \"Plantar Fasciitis: Exercises. \" Current as of: March 21, 2017 Content Version: 11.4 © 8127-7147 Healthwise, Incorporated. Care instructions adapted under license by [employer] (which disclaims liability or warranty for this information). If you have questions about a medical condition or this instruction, always ask your healthcare professional. [place] [address] any warranty or liability for your use of this information. Introducing [facility]! [employer] introduces [facility] [facility] patient portal. Now you can access parts of your medical record, email your doctor's office, and request medication refills online. 1. In your internet browser, go to [email or web address]. [employer] 2. Click on the First Time User? Click Here link in the Sign In box.  You will see the New Member Sign Up page. 3. Enter your [employer] Access Code exactly as it appears below. You will not need to use this code after youve completed the sign-up process. If you do not sign up before the expiration date, you must request a new code. · [employer] Access Code: [email or web address] Expires: 7/25/2018  5:47 PM 
 
4. Enter the last four digits of your Social Security Number (xxxx) and Date of Birth (mm/dd/yyyy) as indicated and click Submit. You will be taken to the next sign-up page. 5. Create a Sk[employer] ID. This will be your [employer] login ID and cannot be changed, so think of one that is secure and easy to remember. 6. Create a [employer] password. You can change your password at any time. 7. Enter your Password Reset Question and Answer. This can be used at a later time if you forget your password. 8. Enter your e-mail address. You will receive e-mail notification when new information is available in [address]. 9. Click Sign Up. You can now view and download portions of your medical record. 10. Click the Download Summary menu link to download a portable copy of your medical information. If you have questions, please visit the Frequently Asked Questions section of the [employer] website. Remember, [employer] is NOT to be used for urgent needs. For medical emergencies, dial 911. Now available from your iPhone and Android! Please provide this summary of care documentation to your next provider. Your primary care clinician is listed as Javier Israel. If you have any questions after today's visit, please call [phone].

## 2018-05-18 ENCOUNTER — DOCUMENTATION ONLY (OUTPATIENT)
Dept: ORTHOPEDIC SURGERY | Age: 38
End: 2018-05-18

## 2018-05-18 NOTE — PROGRESS NOTES
[facility] Medical Request Form received via fax and put in forms bin at [facility]. Fax back to [phone].

## 2018-05-21 ENCOUNTER — HOSPITAL ENCOUNTER (OUTPATIENT)
Dept: LAB | Age: 38
Discharge: HOME | End: 2018-05-21
Payer: COMMERCIAL

## 2018-05-21 ENCOUNTER — HOSPITAL ENCOUNTER (OUTPATIENT)
Dept: GENERAL RADIOLOGY | Age: 38
Discharge: HOME | End: 2018-05-21
Payer: COMMERCIAL

## 2018-05-21 DIAGNOSIS — Z01.812 BLOOD TESTS PRIOR TO TREATMENT OR PROCEDURE: ICD-10-CM

## 2018-05-21 DIAGNOSIS — Z01.818 PREOP EXAMINATION: ICD-10-CM

## 2018-05-21 DIAGNOSIS — Z01.810 PRE-OPERATIVE CARDIOVASCULAR EXAMINATION: ICD-10-CM

## 2018-05-21 DIAGNOSIS — Z01.811 PRE-OPERATIVE RESPIRATORY EXAMINATION: ICD-10-CM

## 2018-05-21 LAB
ALBUMIN SERPL-MCNC: 3.8 G/DL (ref 3.4–5)
ALBUMIN/GLOB SERPL: 1.1 {RATIO} (ref 0.8–1.7)
ALP SERPL-CCNC: 96 U/L (ref 45–117)
ALT SERPL-CCNC: 30 U/L (ref 13–56)
AMPHET UR QL SCN: NEGATIVE
ANION GAP SERPL CALC-SCNC: 9 MMOL/L (ref 3–18)
APPEARANCE UR: CLEAR
AST SERPL-CCNC: 16 U/L (ref 15–37)
ATRIAL RATE: 59 BPM
BACTERIA URNS QL MICRO: ABNORMAL /HPF
BARBITURATES UR QL SCN: NEGATIVE
BASOPHILS # BLD: 0 K/UL (ref 0–0.1)
BASOPHILS NFR BLD: 0 % (ref 0–2)
BENZODIAZ UR QL: NEGATIVE
BILIRUB SERPL-MCNC: 0.5 MG/DL (ref 0.2–1)
BILIRUB UR QL: NEGATIVE
BUN SERPL-MCNC: 15 MG/DL (ref 7–18)
BUN/CREAT SERPL: 20 (ref 12–20)
CALCIUM SERPL-MCNC: 8.6 MG/DL (ref 8.5–10.1)
CALCULATED P AXIS, ECG09: 30 DEGREES
CALCULATED R AXIS, ECG10: -15 DEGREES
CALCULATED T AXIS, ECG11: -3 DEGREES
CANNABINOIDS UR QL SCN: NEGATIVE
CHLORIDE SERPL-SCNC: 105 MMOL/L (ref 100–108)
CO2 SERPL-SCNC: 26 MMOL/L (ref 21–32)
COCAINE UR QL SCN: NEGATIVE
COLOR UR: YELLOW
CREAT SERPL-MCNC: 0.75 MG/DL (ref 0.6–1.3)
CRP SERPL-MCNC: <0.3 MG/DL (ref 0–0.3)
DIAGNOSIS, 93000: NORMAL
DIFFERENTIAL METHOD BLD: NORMAL
EOSINOPHIL # BLD: 0.2 K/UL (ref 0–0.4)
EOSINOPHIL NFR BLD: 3 % (ref 0–5)
EPITH CASTS URNS QL MICRO: ABNORMAL /LPF (ref 0–5)
ERYTHROCYTE [DISTWIDTH] IN BLOOD BY AUTOMATED COUNT: 13.2 % (ref 11.6–14.5)
ERYTHROCYTE [SEDIMENTATION RATE] IN BLOOD: 6 MM/HR (ref 0–20)
GLOBULIN SER CALC-MCNC: 3.6 G/DL (ref 2–4)
GLUCOSE SERPL-MCNC: 94 MG/DL (ref 74–99)
GLUCOSE UR STRIP.AUTO-MCNC: NEGATIVE MG/DL
HCT VFR BLD AUTO: 42.1 % (ref 35–45)
HDSCOM,HDSCOM: NORMAL
HGB BLD-MCNC: 14.2 G/DL (ref 12–16)
HGB UR QL STRIP: NEGATIVE
INR PPP: 0.9 (ref 0.8–1.2)
KETONES UR QL STRIP.AUTO: NEGATIVE MG/DL
LEUKOCYTE ESTERASE UR QL STRIP.AUTO: ABNORMAL
LYMPHOCYTES # BLD: 2.9 K/UL (ref 0.9–3.6)
LYMPHOCYTES NFR BLD: 43 % (ref 21–52)
MCH RBC QN AUTO: 28.7 PG (ref 24–34)
MCHC RBC AUTO-ENTMCNC: 33.7 G/DL (ref 31–37)
MCV RBC AUTO: 85.2 FL (ref 74–97)
METHADONE UR QL: NEGATIVE
MONOCYTES # BLD: 0.5 K/UL (ref 0.05–1.2)
MONOCYTES NFR BLD: 8 % (ref 3–10)
NEUTS SEG # BLD: 3.2 K/UL (ref 1.8–8)
NEUTS SEG NFR BLD: 46 % (ref 40–73)
NITRITE UR QL STRIP.AUTO: NEGATIVE
OPIATES UR QL: NEGATIVE
P-R INTERVAL, ECG05: 150 MS
PCP UR QL: NEGATIVE
PH UR STRIP: 5.5 [PH] (ref 5–8)
PLATELET # BLD AUTO: 263 K/UL (ref 135–420)
PMV BLD AUTO: 10.9 FL (ref 9.2–11.8)
POTASSIUM SERPL-SCNC: 4.1 MMOL/L (ref 3.5–5.5)
PROT SERPL-MCNC: 7.4 G/DL (ref 6.4–8.2)
PROT UR STRIP-MCNC: NEGATIVE MG/DL
PROTHROMBIN TIME: 11.8 SEC (ref 11.5–15.2)
Q-T INTERVAL, ECG07: 418 MS
QRS DURATION, ECG06: 84 MS
QTC CALCULATION (BEZET), ECG08: 413 MS
RBC # BLD AUTO: 4.94 M/UL (ref 4.2–5.3)
RBC #/AREA URNS HPF: ABNORMAL /HPF (ref 0–5)
SODIUM SERPL-SCNC: 140 MMOL/L (ref 136–145)
SP GR UR REFRACTOMETRY: 1.02 (ref 1–1.03)
UROBILINOGEN UR QL STRIP.AUTO: 0.2 EU/DL (ref 0.2–1)
VENTRICULAR RATE, ECG03: 59 BPM
WBC # BLD AUTO: 6.8 K/UL (ref 4.6–13.2)
WBC URNS QL MICRO: ABNORMAL /HPF (ref 0–4)

## 2018-05-21 PROCEDURE — 85610 PROTHROMBIN TIME: CPT | Performed by: ORTHOPAEDIC SURGERY

## 2018-05-21 PROCEDURE — 86038 ANTINUCLEAR ANTIBODIES: CPT | Performed by: ORTHOPAEDIC SURGERY

## 2018-05-21 PROCEDURE — 85025 COMPLETE CBC W/AUTO DIFF WBC: CPT | Performed by: ORTHOPAEDIC SURGERY

## 2018-05-21 PROCEDURE — 87086 URINE CULTURE/COLONY COUNT: CPT | Performed by: ORTHOPAEDIC SURGERY

## 2018-05-21 PROCEDURE — 80053 COMPREHEN METABOLIC PANEL: CPT | Performed by: ORTHOPAEDIC SURGERY

## 2018-05-21 PROCEDURE — 36415 COLL VENOUS BLD VENIPUNCTURE: CPT | Performed by: ORTHOPAEDIC SURGERY

## 2018-05-21 PROCEDURE — 81001 URINALYSIS AUTO W/SCOPE: CPT | Performed by: ORTHOPAEDIC SURGERY

## 2018-05-21 PROCEDURE — 93005 ELECTROCARDIOGRAM TRACING: CPT

## 2018-05-21 PROCEDURE — 85652 RBC SED RATE AUTOMATED: CPT | Performed by: ORTHOPAEDIC SURGERY

## 2018-05-21 PROCEDURE — 80307 DRUG TEST PRSMV CHEM ANLYZR: CPT | Performed by: ORTHOPAEDIC SURGERY

## 2018-05-21 PROCEDURE — 86200 CCP ANTIBODY: CPT | Performed by: ORTHOPAEDIC SURGERY

## 2018-05-21 PROCEDURE — 71046 X-RAY EXAM CHEST 2 VIEWS: CPT

## 2018-05-21 PROCEDURE — 86140 C-REACTIVE PROTEIN: CPT | Performed by: ORTHOPAEDIC SURGERY

## 2018-05-22 LAB — ANA TITR SER IF: NEGATIVE {TITER}

## 2018-05-22 RX ORDER — TRIAMCINOLONE ACETONIDE 55 UG/1
2 SPRAY, METERED NASAL
COMMUNITY

## 2018-05-22 RX ORDER — LORATADINE 10 MG/1
10 TABLET ORAL
COMMUNITY

## 2018-05-23 ENCOUNTER — OFFICE VISIT (OUTPATIENT)
Dept: ORTHOPEDIC SURGERY | Age: 38
End: 2018-05-23

## 2018-05-23 VITALS
HEIGHT: 69 IN | BODY MASS INDEX: 34.69 KG/M2 | OXYGEN SATURATION: 97 % | SYSTOLIC BLOOD PRESSURE: 132 MMHG | WEIGHT: 234.2 LBS | TEMPERATURE: 98.6 F | RESPIRATION RATE: 16 BRPM | DIASTOLIC BLOOD PRESSURE: 85 MMHG | HEART RATE: 80 BPM

## 2018-05-23 DIAGNOSIS — M76.71 TENDINITIS OF RIGHT PERONEUS BREVIS TENDON: Primary | ICD-10-CM

## 2018-05-23 DIAGNOSIS — N39.0 URINARY TRACT INFECTION WITHOUT HEMATURIA, SITE UNSPECIFIED: ICD-10-CM

## 2018-05-23 DIAGNOSIS — Z01.811 PRE-OPERATIVE RESPIRATORY EXAMINATION: ICD-10-CM

## 2018-05-23 DIAGNOSIS — Z01.810 PRE-OPERATIVE CARDIOVASCULAR EXAMINATION: ICD-10-CM

## 2018-05-23 LAB
BACTERIA SPEC CULT: NORMAL
CCP IGA+IGG SERPL IA-ACNC: 4 UNITS (ref 0–19)
SERVICE CMNT-IMP: NORMAL

## 2018-05-23 RX ORDER — FLUCONAZOLE 150 MG/1
150 TABLET ORAL DAILY
Qty: 1 TAB | Refills: 1 | Status: SHIPPED | OUTPATIENT
Start: 2018-05-23 | End: 2018-05-24

## 2018-05-23 RX ORDER — SULFAMETHOXAZOLE AND TRIMETHOPRIM 800; 160 MG/1; MG/1
1 TABLET ORAL 2 TIMES DAILY
Qty: 14 TAB | Refills: 0 | Status: SHIPPED | OUTPATIENT
Start: 2018-05-23 | End: 2018-05-30

## 2018-05-23 RX ORDER — POLYETHYLENE GLYCOL 3350 17 G/17G
17 POWDER, FOR SOLUTION ORAL DAILY
Qty: 10 PACKET | Refills: 1 | Status: SHIPPED | OUTPATIENT
Start: 2018-05-23 | End: 2019-07-17

## 2018-05-23 RX ORDER — HYDROCODONE BITARTRATE AND ACETAMINOPHEN 7.5; 325 MG/1; MG/1
TABLET ORAL
Qty: 50 TAB | Refills: 0 | Status: SHIPPED | OUTPATIENT
Start: 2018-05-23 | End: 2018-06-06 | Stop reason: SDUPTHER

## 2018-05-23 RX ORDER — PROMETHAZINE HYDROCHLORIDE 25 MG/1
25 TABLET ORAL
Qty: 30 TAB | Refills: 0 | Status: SHIPPED | OUTPATIENT
Start: 2018-05-23 | End: 2018-06-06 | Stop reason: SDUPTHER

## 2018-05-23 NOTE — H&P
FOOT AND ANKLE HISTORY AND PHYSICAL      Patient: Taylor Rodriguez                   MRN: [id number]         SSN: [id number]  YOB: 1980               AGE: 40 y.o. SEX: female    Patient scheduled for:  Exploration of the right peroneal brevis and longus tendons, tenosynovectomy of the peroneal brevis and longus tendons, possible excisional debridement and repair of the peroneal brevis tendon, possible tenodesis of the peroneal brevis to peroneal longus tendon. Date of surgery: 6/1/18   Location of Surgery: [facility]   Surgeon: Andriy Andrade. MD Gabi  ANESTHESIA TYPE:  General, Popliteal block          PRESCRIPTIONS AND/OR ORDERS PROVIDED DURING H&P:    Orders Placed This Encounter    Generic Supply Order    CULTURE, URINE    URINALYSIS W/ RFLX MICROSCOPIC    trimethoprim-sulfamethoxazole (BACTRIM DS, SEPTRA DS) 160-800 mg per tablet    HYDROcodone-acetaminophen (NORCO) 7.5-325 mg per tablet    polyethylene glycol (MIRALAX) 17 gram packet    promethazine (PHENERGAN) 25 mg tablet    fluconazole (DIFLUCAN) 150 mg tablet              HISTORY:     The patient was seen in the office today for a preoperative history and physical for an upcoming above listed surgery. The patient is a pleasant 40 y.o. female who has a history of chronic tendinitis that has been present for about 15 years. Additionally, she reports having instability in her ankles. MR imaging findings from 5/8/18 showed a possible tear of one of her peroneal tendons. She denies h/o fibromyalgia or other musculoskeletal disorders. Intermittently, she can experience numbness along her right foot. In reviewing her MRI with and examining her, she has tenderness along the posterolateral border of her fibula along the peroneal tendons and had some swelling in this region. Because of this degenerative signal or abnormal signal, surgical intervention is recommended.     Due to the current findings, affected activity of daily living and continued pain and discomfort, surgical intervention is indicated. The alternatives, risks, and complications, including but not limited to infection, blood loss, need for blood transfusion, neurovascular damage, maggie-incisional numbness, subcutaneous hematoma, bone fracture, anesthetic complications, DVT, PE, death, RSD, postoperative stiffness and pain, possible surgical scar, delayed healing and nonhealing, reflexive sympathetic dystrophy, damage to blood vessels and nerves, need for more surgery, MI, and stroke have been discussed. The patient understands and wishes to proceed with surgery. Patient can expect to be out of work for approximately 6 - 8 weeks following surgery. Because she has decreased monofilament testing on her right foot, dorsal, plantar, and in the DPN nerve distribution, as well as along the toes and plantar portion of her right forefoot along the medial plantar nerve distribution, EMG nerve conduction studies of her bilateral lower extremities were recommended and completed on 5/21/18. The results will be ready on 5/28/18. PAST MEDICAL HISTORY:     Past Medical History:   Diagnosis Date    Anemia     Chronic pain     knees and ankles    GERD (gastroesophageal reflux disease)     during pregancy    Headache     Hemorrhoid     History of body piercing     Hypercholesterolemia     IUD (intrauterine device) in place     Migraine headache     SIFUENTES (nonalcoholic steatohepatitis) (per outside records) 6/11/2017    denies liver disease 5/22/2018    Tendinitis        CURRENT MEDICATIONS:     Current Outpatient Prescriptions   Medication Sig Dispense Refill    trimethoprim-sulfamethoxazole (BACTRIM DS, SEPTRA DS) 160-800 mg per tablet Take 1 Tab by mouth two (2) times a day for 7 days.  14 Tab 0    HYDROcodone-acetaminophen (NORCO) 7.5-325 mg per tablet TAKE ONE TO TWO TABLETS BY MOUTH Q 4 - 6 HRS PRN PAIN **AFTER SURGERY**DO NOT TAKE BEFORE SURGERY Indications: Pain 50 Tab 0    polyethylene glycol (MIRALAX) 17 gram packet Take 1 Packet by mouth daily. Mix in 8 oz prune juice 10 Packet 1    promethazine (PHENERGAN) 25 mg tablet Take 1 Tab by mouth every six (6) hours as needed for Nausea. 30 Tab 0    fluconazole (DIFLUCAN) 150 mg tablet Take 1 Tab by mouth daily for 1 day. 1 Tab 1    loratadine (CLARITIN) 10 mg tablet Take 10 mg by mouth daily as needed for Allergies.  triamcinolone (NASACORT) 55 mcg nasal inhaler 2 Sprays daily as needed.  multivitamin (ONE A DAY) tablet Take 1 Tab by mouth daily.  naproxen (NAPROSYN) 500 mg tablet Take 1 Tab by mouth two (2) times daily (with meals). (Patient taking differently: Take 500 mg by mouth two (2) times daily as needed.) 14 Tab 0    cholecalciferol, VITAMIN D3, (VITAMIN D3) 5,000 unit tab tablet Take  by mouth every Wednesday.          ALLERGIES:     Allergies   Allergen Reactions    Pcn [Penicillins] Hives, Swelling and Other (comments)     Throat closes    Shellfish Derived Hives, Swelling and Other (comments)     Throat closes         SURGICAL HISTORY:     Past Surgical History:   Procedure Laterality Date    HX OTHER SURGICAL      Tooth extraction (molars)       SOCIAL HISTORY:     Social History     Social History    Marital status: UNKNOWN     Spouse name: N/A    Number of children: N/A    Years of education: N/A     Social History Main Topics    Smoking status: Never Smoker    Smokeless tobacco: Never Used    Alcohol use Yes      Comment: rare    Drug use: No    Sexual activity: Not Asked     Other Topics Concern    None     Social History Narrative       FAMILY HISTORY:     Family History   Problem Relation Age of Onset    Diabetes Mother     Hypertension Mother     Bipolar Disorder Mother     Elevated Lipids Father     COPD Maternal Grandmother     Emphysema Maternal Grandmother     Lung Disease Maternal Grandmother     No Known Problems Maternal Grandfather     Stroke Paternal Grandmother     No Known Problems Paternal Grandfather     Allergic Rhinitis Daughter     Eczema Daughter     Headache Daughter        REVIEW OF SYSTEMS:     Negative for fevers, chills, chest pain, shortness of breath, weight loss, recent illness     General: Negative for fever and chills. No unexpected change in weight. Denies fatigue. No change in appetite. Skin: Negative for rash or itching. HEENT: Negative for congestion, sore throat, neck pain and neck stiffness. No change in vision or hearing. Hasn't noted any enlarged lymph nodes in the neck. Cardiovascular:  Negative for chest pain and palpitations. Has not noted pedal edema. Respiratory: Negative for cough, colds, sinus, hemoptysis, shortness of breath and wheezing. Gastrointestinal: Negative for nausea and vomiting, rectal bleeding, coffee ground emesis, abdominal pain, diarrhea and constipation. Genitourinary: Negative for dysuria, frequency urgency, or burning on micturition. No flank pain, no foul smelling urine, no difficulty with initiating urination. Hematological: Negative for bleeding or easy bruising. Musculoskeletal: Negative  for arthralgias, back pain or neck pain. Neurological: Negative for dizziness, seizures or syncopal episodes. Denies headaches. Endocrine: Denies excessive thirst.  No heat/cold intolerance. Psychiatric: Negative for depression or insomnia. PHYSICAL EXAMINATION:     VITALS:   Visit Vitals    /85    Pulse 80    Temp 98.6 °F (37 °C) (Oral)    Resp 16    Ht 5' 9\" (1.753 m)    Wt 234 lb 3.2 oz (106.2 kg)    SpO2 97%    BMI 34.59 kg/m2       GEN:  Well developed, well nourished 40 y.o. female in no acute distress. PSYCH: Alert an oriented to person, place and time. Mood, memory, affect, behavior and judgment normal  HEENT: Normocephalic and atraumatic. Eyes: Conjunctivae and EOM are normal.Pupils are equal, round, and reactive to light.  External ear normal appearance, external nose normal appearing. Mouth/Throat: Oropharynx is clear and moist, able to handle oral secretions w/out difficulty, airway patent  NECK: Supple. Normal ROM, No lymphadenopathy. Trachea is midline. No bruising, swelling or deformity  RESP: Clear to auscultation bilaterally. No wheezes, rales, rhonchi. Normal effort and breath sounds. No respiratory distress  CARDIO: Normal rate, regular rhythm and normal heart sounds. No MGR. ABDOMEN: Soft, non-tender, non-distended, normoactive bowel sounds in all four quadrants. There is no tenderness. There is no rebound and no guarding. BACK: No CVA or spinal tenderness  BREAST:  Deferred  PELVIC:    Deferred   RECTAL:  Deferred   :           Deferred  EXTREMITIES: EXAMINATION OF: Right ANKLE and FOOT       Gait: slow  Tenderness: Moderate tenderness along her noninsertional Achilles' tendon (8 cm from insertional point with ankle in full DF)    Moderate tenderness to peroneal tendons. Cutaneous: No rashes, skin patches, wounds, or abrasions to the lower legs           Warm and Normal color. No regions of expressible drainage. Medial Border of Tibia Region: absent           Skin color, texture, turgor normal. Normal.           Mild swelling to the PL fibula. Joint Motion: ROM Ankle:Normal , Hindfoot: (ST,TN,CC Normal}, Forefoot toes:Normal  Neurologic Exam: Neuro: Motor: normal 5/5 strength in all tested muscle groups             Sensory : diminished sensation on monofilament test.  Contractures: Gastrocnemius or Achilles Contractures absent  Joint / Tendon Stability: Not tested due to guarding. Alignment: Normal foot alignment and  Flexible  Vascular: Normal Pulses/ NL Capillary refill, No evidence of DVT seen on physical exam.   No calf swelling, no tenderness to calf muscles. Lymphatic:  No Evidence of Lymphedema.       RADIOGRAPHS & DIAGNOSTIC STUDIES:      MRI of the right ankle completed at [facility] 5/8/18 revealed:    No evidence of acute fracture or dislocation. There is subtle abnormality to the peroneal brevis tendon concerning for tendinosis, and/or a short segment, longitudinal tear to the peroneal brevis tendon. It showed a small tibial effusion and small subtalar joint effusion. The findings are suggestive of a chronic plantar fasciitis also but no acute component of plantar fasciitis. LABS:     @  CBC:   Lab Results   Component Value Date/Time    WBC 6.8 05/21/2018 01:11 PM    RBC 4.94 05/21/2018 01:11 PM    HGB 14.2 05/21/2018 01:11 PM    HCT 42.1 05/21/2018 01:11 PM    PLATELET 390 40/95/6691 01:11 PM   , CMP:   Lab Results   Component Value Date/Time    Glucose 94 05/21/2018 01:11 PM    Sodium 140 05/21/2018 01:11 PM    Potassium 4.1 05/21/2018 01:11 PM    Chloride 105 05/21/2018 01:11 PM    CO2 26 05/21/2018 01:11 PM    BUN 15 05/21/2018 01:11 PM    Creatinine 0.75 05/21/2018 01:11 PM    Calcium 8.6 05/21/2018 01:11 PM    Anion gap 9 05/21/2018 01:11 PM    BUN/Creatinine ratio 20 05/21/2018 01:11 PM    Alk. phosphatase 96 05/21/2018 01:11 PM    Protein, total 7.4 05/21/2018 01:11 PM    Albumin 3.8 05/21/2018 01:11 PM    Globulin 3.6 05/21/2018 01:11 PM    A-G Ratio 1.1 05/21/2018 01:11 PM    and Coagulation:   Lab Results   Component Value Date/Time    Prothrombin time 11.8 05/21/2018 01:11 PM    INR 0.9 05/21/2018 01:11 PM   @    Preoperative labs were reviewed and are substantially within normal limits with exception of UA which reveal 1+ bacteria and small Leukocyte Esterase - Patient will be treated with Bactrim   Chest X-ray revealed no acute cardiopulmonary process   EKG:     Sinus bradycardia   Nonspecific T wave abnormality   Abnormal ECG   No previous ECGs available   Confirmed by Heather Pickens MD, --- ([id number]) on 5/21/2018 3:05:18 PM       ASSESSMENT:       Encounter Diagnoses   Name Primary?     Tendinitis of right peroneus brevis tendon Yes    Pre-operative respiratory examination     Urinary tract infection without hematuria, site unspecified     Pre-operative cardiovascular examination        PLAN:     Again, the alternatives, risks, and complications, as well as expected outcome were discussed. The patient understands and agrees to proceed with the above listed surgery pending EKG review by anesthesia. Patient has been given Hibiclens wash with instructions and prescriptions and or orders listed above.     He Jones PA-C  5/23/2018  10:26 AM

## 2018-05-23 NOTE — PATIENT INSTRUCTIONS
Dr. Bryan Duke Pre-operative Instructions:      Patient: Meghana Escobar   :  1980     I understand I am to stop taking oral birth control pills, hormonal replacement therapy and all Aspirin, Aspirin containing medications, Non-Steroidal Anti-Inflammatory medications (such as Advil, Aleve, Motrin, Ibuprofen) and or Blood thinner medication such as Coumadin, Plavix, Heparin or others 5 days prior to surgery. I understand I am to STOP taking these Medications 5 days prior to surgery:  I am to get instructions from my prescribing physician. 1. __As listed above_______________________  2. _____________________________________  3. _____________________________________  4. _____________________________________    I understand that if I am taking daily medications for high blood pressure, I can take them the morning of surgery with a small sip of water. I will consult my prescribing physician or call [facility] with specific questions. I also understand that:     I am to report important observations or changes that may occur prior to surgery. If I have any changes in my physical condition, such as a rash, a fever, sore throat, abscess, ulcers, nausea, vomiting, or diarrhea. I am to call the office and I am to consult my primary care physician to assess and treat the problem.  I am not to eat or drink anything after midnight the night before my surgery.  I am not to drink alcoholic beverages 24 hours prior to surgery.  I am not to do any illegal drugs prior to surgery.  I am not to smoke at least 24 hours prior to surgery.  I am able and will shower or bathe before surgery. I will use the Hibiclens solution on my surgical site only. The hibiclens directions are one packet a day starting two days before surgery.  I will remove any nail polish, make-up or jewelry prior to arriving for my surgery.       If I wear glasses, contact lenses or dentures they must be removed prior to going to the operating room.  All body piercing and artifical eye-lashes must be removed prior to surgery     I will not wear any aerosol sprays, perfumes or skin creams.  I am to make arrangements for a family member or friend to accompany me to surgery and take me home after my surgery as I will not be allowed to leave the hospital alone. A cab or bus will not be acceptable. Please make arrange for someone to stay with you for 24 hours after surgery.  Patient has expressed understanding of the diagnosis, treatment and planned surgery        Surgery: What to Expect at [address] [place] [address]  This care sheet gives you a general idea about how long it will take for you to recover from your surgery. But each person recovers at a different pace. How can you care for yourself at home? Activity  · Allow your body to heal. Don't move quickly or lift anything heavy until you are feeling better. · Rest when you feel tired. · Your doctor may give you specific instructions on when you can do your normal activities again, such as driving and going back to work. · Be active. Walking is a good choice. Diet  · You can eat your normal diet when you feel well. If your stomach is upset, try bland, low-fat foods like plain rice, broiled chicken, toast, and yogurt. · If your bowel movements are not regular right after surgery, try to avoid constipation and straining. Drink plenty of water. Your doctor may suggest fiber, a stool softener, or a mild laxative. Medicines  · Your doctor will tell you if and when you can restart your medicines. He or she will also give you instructions about taking any new medicines. · If you take blood thinners, such as warfarin (Coumadin), clopidogrel (Plavix), or aspirin, be sure to talk to your doctor. He or she will tell you if and when to start taking those medicines again. Make sure that you understand exactly what your doctor wants you to do. · Be safe with medicines.  Read and follow all instructions on the label. ¨ If the doctor gave you a prescription medicine for pain, take it as prescribed. ¨ If you are not taking a prescription pain medicine, ask your doctor if you can take an over-the-counter medicine. Incision care  · You will have a dressing over the cut (incision). A dressing helps the incision heal and protects it. Your doctor will tell you how to take care of this. · If you have strips of tape on the cut the doctor made, leave the tape on for a week or until it falls off. · If you had stitches, your doctor will tell you when to come back to have them removed. · If you have skin adhesive on the cut, leave it on until it falls off. Skin adhesive is also called liquid stitches. · Change the bandage every day. · Wash the area daily with warm, soapy water, and pat it dry. Don't use hydrogen peroxide or alcohol. They can slow healing. · You may cover the area with a gauze bandage if it oozes fluid or rubs against clothing. · You may shower 24 to 48 hours after surgery. Pat the incision dry. Don't swim or take a bath for the first 2 weeks, or until your doctor tells you it is okay. Follow-up care is a key part of your treatment and safety. Be sure to make and go to all appointments, and call your doctor if you are having problems. It's also a good idea to know your test results and keep a list of the medicines you take. When should you call for help? Call 911 anytime you think you may need emergency care. For example, call if:  · You passed out (lost consciousness). · You have severe trouble breathing. · You have sudden chest pain and shortness of breath, or you cough up blood. Call your doctor now or seek immediate medical care if:  · You have pain that does not get better after you take pain medicine. · You have loose stitches, or your incision comes open. · You are bleeding through your dressing.  A small amount of blood is normal.  · You have signs of infection, such as:  ¨ Increased pain, swelling, warmth, or redness. ¨ Red streaks leading from the incision. ¨ Pus draining from the incision. ¨ A fever. · You have symptoms of a blood clot in your arm or leg (called a deep vein thrombosis). These may include:  ¨ Pain in your calf, back of the knee, thigh, or groin. ¨ Redness and swelling in the arm, leg, or groin. Watch closely for any changes in your health, and be sure to contact your doctor if:  · You do not have a bowel movement after taking a laxative. Where can you learn more? Go to http://petar-regis.info/  Enter V814 in the search box to learn more about \"Surgery: What to Expect at Home. \"  © 7600-3170 [employer]. Care instructions adapted under license by [employer] (which disclaims liability or warranty for this information). This care instruction is for use with your licensed healthcare professional. If you have questions about a medical condition or this instruction, always ask your healthcare professional. [place] [address] any warranty or liability for your use of this information. Content Version: 27.6.580082; Current as of: November 20, 2015          Acute Pain After Surgery: Care Instructions  Your Care Instructions      It's common to have some pain after surgery. Pain doesn't mean that something is wrong or that the surgery didn't go well. But when the pain is severe, it's important to work with your doctor to manage it. It's also important to be aware of a few facts about pain and pain medicine. · You are the only person who knows what your pain feels like. So be sure to tell your doctor when you are in pain or when the pain changes. Then he or she will know how to adjust your medicines. · Pain is often easier to control right after it starts. So it may be better to take regular doses of pain medicine and not wait until the pain gets bad. · Medicine can help control pain.  But this doesn't mean you'll have no pain. Medicine works to keep the pain at a level you can live with. With time, you will feel better. Follow-up care is a key part of your treatment and safety. Be sure to make and go to all appointments, and call your doctor if you are having problems. It's also a good idea to know your test results and keep a list of the medicines you take. How can you care for yourself at home? · Be safe with medicines. Read and follow all instructions on the label. ¨ If the doctor gave you a prescription medicine for pain, take it as prescribed. ¨ If you are not taking a prescription pain medicine, ask your doctor if you can take an over-the-counter medicine. · If you take an over-the-counter pain medicine, such as acetaminophen (Tylenol), ibuprofen (Advil, Motrin), or naproxen (Aleve), read and follow all instructions on the label. · Do not take two or more pain medicines at the same time unless the doctor told you to. · Do not drink alcohol while you are taking pain medicines. · Try to walk each day if your doctor recommends it. Start by walking a little more than you did the day before. Bit by bit, increase the amount you walk. Walking increases blood flow. It also helps prevent pneumonia and constipation. · To prevent constipation from opioid pain medicines:  ¨ Talk to your doctor about a laxative. ¨ Include fruits, vegetables, beans, and whole grains in your diet each day. These foods are high in fiber. ¨ Drink plenty of fluids, enough so that your urine is light yellow or clear like water. Drink water, fruit juice, or other drinks that do not contain caffeine or alcohol. If you have kidney, heart, or liver disease and have to limit fluids, talk with your doctor before you increase the amount of fluids you drink. ¨ Take a fiber supplement, such as Citrucel or Metamucil, every day if needed. Read and follow all instructions on the label.  If you take pain medicine for more than a few days, talk to your doctor before you take fiber. When should you call for help? Call your doctor now or seek immediate medical care if:  ? · Your pain gets worse. ? · Your pain is not controlled by medicine. ? Watch closely for changes in your health, and be sure to contact your doctor if you have any problems. Where can you learn more? Go to http://petar-regis.info/. Enter ([phone] 041-387 in the search box to learn more about \"Acute Pain After Surgery: Care Instructions. \"  Current as of: March 20, 2017  Content Version: 11.4  © 9830-4534 [employer]. Care instructions adapted under license by [employer] (which disclaims liability or warranty for this information). If you have questions about a medical condition or this instruction, always ask your healthcare professional. [address] any warranty or liability for your use of this information.                                                  [address]

## 2018-05-23 NOTE — PROGRESS NOTES
FOOT AND ANKLE HISTORY AND PHYSICAL      Patient: Joel Bell                   MRN: [id number]         SSN: [id number]  YOB: 1980               AGE: 40 y.o. SEX: female    Patient scheduled for:  Exploration of the right peroneal brevis and longus tendons, tenosynovectomy of the peroneal brevis and longus tendons, possible excisional debridement and repair of the peroneal brevis tendon, possible tenodesis of the peroneal brevis to peroneal longus tendon. Date of surgery: 6/1/18   [facility]   Surgeon: Kashif Ashley MD  ANESTHESIA TYPE:  General, Popliteal block          PRESCRIPTIONS AND/OR ORDERS PROVIDED DURING H&P:    Orders Placed This Encounter    Generic Supply Order    CULTURE, URINE    URINALYSIS W/ RFLX MICROSCOPIC    trimethoprim-sulfamethoxazole (BACTRIM DS, SEPTRA DS) 160-800 mg per tablet    HYDROcodone-acetaminophen (NORCO) 7.5-325 mg per tablet    polyethylene glycol (MIRALAX) 17 gram packet    promethazine (PHENERGAN) 25 mg tablet    fluconazole (DIFLUCAN) 150 mg tablet              HISTORY:     The patient was seen in the office today for a preoperative history and physical for an upcoming above listed surgery. The patient is a pleasant 40 y.o. female who has a history of chronic tendinitis that has been present for about 15 years. Additionally, she reports having instability in her ankles. MR imaging findings from 5/8/18 showed a possible tear of one of her peroneal tendons. She denies h/o fibromyalgia or other musculoskeletal disorders. Intermittently, she can experience numbness along her right foot. In reviewing her MRI with and examining her, she has tenderness along the posterolateral border of her fibula along the peroneal tendons and had some swelling in this region. Because of this degenerative signal or abnormal signal, surgical intervention is recommended.     Due to the current findings, affected activity of daily living and continued pain and discomfort, surgical intervention is indicated. The alternatives, risks, and complications, including but not limited to infection, blood loss, need for blood transfusion, neurovascular damage, maggie-incisional numbness, subcutaneous hematoma, bone fracture, anesthetic complications, DVT, PE, death, RSD, postoperative stiffness and pain, possible surgical scar, delayed healing and nonhealing, reflexive sympathetic dystrophy, damage to blood vessels and nerves, need for more surgery, MI, and stroke have been discussed. The patient understands and wishes to proceed with surgery. Patient can expect to be out of work for approximately 6 - 8 weeks following surgery. Because she has decreased monofilament testing on her right foot, dorsal, plantar, and in the DPN nerve distribution, as well as along the toes and plantar portion of her right forefoot along the medial plantar nerve distribution, EMG nerve conduction studies of her bilateral lower extremities were recommended and completed on 5/21/18. The results will be ready on 5/28/18. PAST MEDICAL HISTORY:     Past Medical History:   Diagnosis Date    Anemia     Chronic pain     knees and ankles    GERD (gastroesophageal reflux disease)     during pregancy    Headache     Hemorrhoid     History of body piercing     Hypercholesterolemia     IUD (intrauterine device) in place     Migraine headache     SIFUENTES (nonalcoholic steatohepatitis) (per outside records) 6/11/2017    denies liver disease 5/22/2018    Tendinitis        CURRENT MEDICATIONS:     Current Outpatient Prescriptions   Medication Sig Dispense Refill    trimethoprim-sulfamethoxazole (BACTRIM DS, SEPTRA DS) 160-800 mg per tablet Take 1 Tab by mouth two (2) times a day for 7 days.  14 Tab 0    HYDROcodone-acetaminophen (NORCO) 7.5-325 mg per tablet TAKE ONE TO TWO TABLETS BY MOUTH Q 4 - 6 HRS PRN PAIN **AFTER SURGERY**DO NOT TAKE BEFORE SURGERY Indications: Pain 50 Tab 0    polyethylene glycol (MIRALAX) 17 gram packet Take 1 Packet by mouth daily. Mix in 8 oz prune juice 10 Packet 1    promethazine (PHENERGAN) 25 mg tablet Take 1 Tab by mouth every six (6) hours as needed for Nausea. 30 Tab 0    fluconazole (DIFLUCAN) 150 mg tablet Take 1 Tab by mouth daily for 1 day. 1 Tab 1    loratadine (CLARITIN) 10 mg tablet Take 10 mg by mouth daily as needed for Allergies.  triamcinolone (NASACORT) 55 mcg nasal inhaler 2 Sprays daily as needed.  multivitamin (ONE A DAY) tablet Take 1 Tab by mouth daily.  naproxen (NAPROSYN) 500 mg tablet Take 1 Tab by mouth two (2) times daily (with meals). (Patient taking differently: Take 500 mg by mouth two (2) times daily as needed.) 14 Tab 0    cholecalciferol, VITAMIN D3, (VITAMIN D3) 5,000 unit tab tablet Take  by mouth every Wednesday.          ALLERGIES:     Allergies   Allergen Reactions    Pcn [Penicillins] Hives, Swelling and Other (comments)     Throat closes    Shellfish Derived Hives, Swelling and Other (comments)     Throat closes         SURGICAL HISTORY:     Past Surgical History:   Procedure Laterality Date    HX OTHER SURGICAL      Tooth extraction (molars)       SOCIAL HISTORY:     Social History     Social History    Marital status: UNKNOWN     Spouse name: N/A    Number of children: N/A    Years of education: N/A     Social History Main Topics    Smoking status: Never Smoker    Smokeless tobacco: Never Used    Alcohol use Yes      Comment: rare    Drug use: No    Sexual activity: Not Asked     Other Topics Concern    None     Social History Narrative       FAMILY HISTORY:     Family History   Problem Relation Age of Onset    Diabetes Mother     Hypertension Mother     Bipolar Disorder Mother     Elevated Lipids Father     COPD Maternal Grandmother     Emphysema Maternal Grandmother     Lung Disease Maternal Grandmother     No Known Problems Maternal Grandfather     Stroke Paternal Grandmother     No Known Problems Paternal Grandfather     Allergic Rhinitis Daughter     Eczema Daughter     Headache Daughter        REVIEW OF SYSTEMS:     Negative for fevers, chills, chest pain, shortness of breath, weight loss, recent illness     General: Negative for fever and chills. No unexpected change in weight. Denies fatigue. No change in appetite. Skin: Negative for rash or itching. HEENT: Negative for congestion, sore throat, neck pain and neck stiffness. No change in vision or hearing. Hasn't noted any enlarged lymph nodes in the neck. Cardiovascular:  Negative for chest pain and palpitations. Has not noted pedal edema. Respiratory: Negative for cough, colds, sinus, hemoptysis, shortness of breath and wheezing. Gastrointestinal: Negative for nausea and vomiting, rectal bleeding, coffee ground emesis, abdominal pain, diarrhea and constipation. Genitourinary: Negative for dysuria, frequency urgency, or burning on micturition. No flank pain, no foul smelling urine, no difficulty with initiating urination. Hematological: Negative for bleeding or easy bruising. Musculoskeletal: Negative  for arthralgias, back pain or neck pain. Neurological: Negative for dizziness, seizures or syncopal episodes. Denies headaches. Endocrine: Denies excessive thirst.  No heat/cold intolerance. Psychiatric: Negative for depression or insomnia. PHYSICAL EXAMINATION:     VITALS:   Visit Vitals    /85    Pulse 80    Temp 98.6 °F (37 °C) (Oral)    Resp 16    Ht 5' 9\" (1.753 m)    Wt 234 lb 3.2 oz (106.2 kg)    SpO2 97%    BMI 34.59 kg/m2       GEN:  Well developed, well nourished 40 y.o. female in no acute distress. PSYCH: Alert an oriented to person, place and time. Mood, memory, affect, behavior and judgment normal  HEENT: Normocephalic and atraumatic. Eyes: Conjunctivae and EOM are normal.Pupils are equal, round, and reactive to light.  External ear normal appearance, external nose normal appearing. Mouth/Throat: Oropharynx is clear and moist, able to handle oral secretions w/out difficulty, airway patent  NECK: Supple. Normal ROM, No lymphadenopathy. Trachea is midline. No bruising, swelling or deformity  RESP: Clear to auscultation bilaterally. No wheezes, rales, rhonchi. Normal effort and breath sounds. No respiratory distress  CARDIO: Normal rate, regular rhythm and normal heart sounds. No MGR. ABDOMEN: Soft, non-tender, non-distended, normoactive bowel sounds in all four quadrants. There is no tenderness. There is no rebound and no guarding. BACK: No CVA or spinal tenderness  BREAST:  Deferred  PELVIC:    Deferred   RECTAL:  Deferred   :           Deferred  EXTREMITIES: EXAMINATION OF: Right ANKLE and FOOT       Gait: slow  Tenderness: Moderate tenderness along her noninsertional Achilles' tendon (8 cm from insertional point with ankle in full DF)    Moderate tenderness to peroneal tendons. Cutaneous: No rashes, skin patches, wounds, or abrasions to the lower legs           Warm and Normal color. No regions of expressible drainage. Medial Border of Tibia Region: absent           Skin color, texture, turgor normal. Normal.           Mild swelling to the PL fibula. Joint Motion: ROM Ankle:Normal , Hindfoot: (ST,TN,CC Normal}, Forefoot toes:Normal  Neurologic Exam: Neuro: Motor: normal 5/5 strength in all tested muscle groups             Sensory : diminished sensation on monofilament test.  Contractures: Gastrocnemius or Achilles Contractures absent  Joint / Tendon Stability: Not tested due to guarding. Alignment: Normal foot alignment and  Flexible  Vascular: Normal Pulses/ NL Capillary refill, No evidence of DVT seen on physical exam.   No calf swelling, no tenderness to calf muscles. Lymphatic:  No Evidence of Lymphedema.       RADIOGRAPHS & DIAGNOSTIC STUDIES:      MRI of the right ankle completed at [facility] 5/8/18 revealed:    No evidence of acute fracture or dislocation. There is subtle abnormality to the peroneal brevis tendon concerning for tendinosis, and/or a short segment, longitudinal tear to the peroneal brevis tendon. It showed a small tibial effusion and small subtalar joint effusion. The findings are suggestive of a chronic plantar fasciitis also but no acute component of plantar fasciitis. LABS:     @  CBC:   Lab Results   Component Value Date/Time    WBC 6.8 05/21/2018 01:11 PM    RBC 4.94 05/21/2018 01:11 PM    HGB 14.2 05/21/2018 01:11 PM    HCT 42.1 05/21/2018 01:11 PM    PLATELET 164 15/82/0976 01:11 PM   , CMP:   Lab Results   Component Value Date/Time    Glucose 94 05/21/2018 01:11 PM    Sodium 140 05/21/2018 01:11 PM    Potassium 4.1 05/21/2018 01:11 PM    Chloride 105 05/21/2018 01:11 PM    CO2 26 05/21/2018 01:11 PM    BUN 15 05/21/2018 01:11 PM    Creatinine 0.75 05/21/2018 01:11 PM    Calcium 8.6 05/21/2018 01:11 PM    Anion gap 9 05/21/2018 01:11 PM    BUN/Creatinine ratio 20 05/21/2018 01:11 PM    Alk. phosphatase 96 05/21/2018 01:11 PM    Protein, total 7.4 05/21/2018 01:11 PM    Albumin 3.8 05/21/2018 01:11 PM    Globulin 3.6 05/21/2018 01:11 PM    A-G Ratio 1.1 05/21/2018 01:11 PM    and Coagulation:   Lab Results   Component Value Date/Time    Prothrombin time 11.8 05/21/2018 01:11 PM    INR 0.9 05/21/2018 01:11 PM   @    Preoperative labs were reviewed and are substantially within normal limits with exception of UA which reveal 1+ bacteria and small Leukocyte Esterase - Patient will be treated with Bactrim   Chest X-ray revealed no acute cardiopulmonary process   EKG:     Sinus bradycardia   Nonspecific T wave abnormality   Abnormal ECG   No previous ECGs available   Confirmed by Philomena Borja MD, --- ([id number]) on 5/21/2018 3:05:18 PM       ASSESSMENT:       Encounter Diagnoses   Name Primary?     Tendinitis of right peroneus brevis tendon Yes    Pre-operative respiratory examination     Urinary tract infection without hematuria, site unspecified     Pre-operative cardiovascular examination        PLAN:     Again, the alternatives, risks, and complications, as well as expected outcome were discussed. The patient understands and agrees to proceed with the above listed surgery pending EKG review by anesthesia. Patient has been given Hibiclens wash with instructions and prescriptions and or orders listed above.     He Jones PA-C  5/23/2018  10:26 AM

## 2018-05-23 NOTE — MR AVS SNAPSHOT
[place], [address] [facility] 
[phone] Patient: Kasey Rose MRN: [id number] BDL:[birth year]28 Visit Information Date & Time Provider Department Dept. Phone Encounter #  
 5/23/2018 10:00  Bobby Merrill, [address] [facility] [phone] [id number] Follow-up Instructions Return in about 2 weeks (around 6/6/2018) for post surgical evaluation. Your Appointments 6/6/2018  1:00 PM  
POST OP with Iwona Momin PA-C  
[address]14 [facility], [address] [facility] ([facility]) Appt Note: Right Peroneal Brevis tenosynovectomy [address], [address] 70 [facility]  
[phone] 1212 [facility], [address] Upcoming Health Maintenance Date Due DTaP/Tdap/Td series (1 - Tdap) 8/28/2001 PAP AKA CERVICAL CYTOLOGY 8/28/2001 Influenza Age 5 to Adult 8/1/2018 Allergies as of 5/23/2018  Review Complete On: 5/23/2018 By: Iwona Momin PA-C Severity Noted Reaction Type Reactions Pcn [Penicillins] High 05/12/2017    Hives, Swelling, Other (comments) Throat closes Shellfish Derived High 05/12/2017    Hives, Swelling, Other (comments) Throat closes Current Immunizations  Never Reviewed No immunizations on file. Not reviewed this visit You Were Diagnosed With   
  
 Codes Comments Tendinitis of right peroneus brevis tendon    -  Primary ICD-10-CM: M76.71 
ICD-9-CM: 727.06   
 Pre-operative respiratory examination     ICD-10-CM: C09.788 ICD-9-CM: V72.82 Urinary tract infection without hematuria, site unspecified     ICD-10-CM: N39.0 ICD-9-CM: 599.0 Vitals BP Pulse Temp Resp Height(growth percentile) Weight(growth percentile) 132/85 80 98.6 °F (37 °C) (Oral) 16 5' 9\" (1.753 m) 234 lb 3.2 oz (106.2 kg) SpO2 BMI OB Status Smoking Status 97% 34.59 kg/m2 IUD Never Smoker BMI and BSA Data Body Mass Index Body Surface Area 34.59 kg/m 2 2.27 m 2 Preferred Pharmacy Pharmacy Name Phone [facility]  [phone] Your Updated Medication List  
  
   
This list is accurate as of 5/23/18 11:35 AM.  Always use your most recent med list.  
  
  
  
  
 cholecalciferol (VITAMIN D3) 5,000 unit Tab tablet Commonly known as:  VITAMIN D3 Take  by mouth every Wednesday. CLARITIN 10 mg tablet Generic drug:  loratadine Take 10 mg by mouth daily as needed for Allergies. fluconazole 150 mg tablet Commonly known as:  DIFLUCAN Take 1 Tab by mouth daily for 1 day. HYDROcodone-acetaminophen 7.5-325 mg per tablet Commonly known as:  NORCO  
TAKE ONE TO TWO TABLETS BY MOUTH Q 4 - 6 HRS PRN PAIN **AFTER SURGERY**DO NOT TAKE BEFORE SURGERY  Indications: Pain  
  
 multivitamin tablet Commonly known as:  ONE A DAY Take 1 Tab by mouth daily. naproxen 500 mg tablet Commonly known as:  NAPROSYN Take 1 Tab by mouth two (2) times daily (with meals). NASACORT 55 mcg nasal inhaler Generic drug:  triamcinolone 2 Sprays daily as needed. polyethylene glycol 17 gram packet Commonly known as:  Claudeen Cast Take 1 Packet by mouth daily. Mix in 8 oz prune juice  
  
 promethazine 25 mg tablet Commonly known as:  PHENERGAN Take 1 Tab by mouth every six (6) hours as needed for Nausea. trimethoprim-sulfamethoxazole 160-800 mg per tablet Commonly known as:  BACTRIM DS, SEPTRA DS Take 1 Tab by mouth two (2) times a day for 7 days. Prescriptions Printed Refills  
 trimethoprim-sulfamethoxazole (BACTRIM DS, SEPTRA DS) 160-800 mg per tablet 0 Sig: Take 1 Tab by mouth two (2) times a day for 7 days. Class: Print  Route: Oral  
 HYDROcodone-acetaminophen (NORCO) 7.5-325 mg per tablet 0  
 Sig: TAKE ONE TO TWO TABLETS BY MOUTH Q 4 - 6 HRS PRN PAIN **AFTER SURGERY**DO NOT TAKE BEFORE SURGERY  Indications: Pain Class: Print  
 polyethylene glycol (MIRALAX) 17 gram packet 1 Sig: Take 1 Packet by mouth daily. Mix in 8 oz prune juice Class: Print Route: Oral  
 promethazine (PHENERGAN) 25 mg tablet 0 Sig: Take 1 Tab by mouth every six (6) hours as needed for Nausea. Class: Print Route: Oral  
 fluconazole (DIFLUCAN) 150 mg tablet 1 Sig: Take 1 Tab by mouth daily for 1 day. Class: Print Route: Oral  
  
We Performed the Following AMB SUPPLY ORDER [[id number] Custom] Comments:  
 2018 11:27 AM 
 
Roll about knee roller I-walk knee crutch Shower Chair Toilet riser [employer] Follow-up Instructions Return in about 2 weeks (around 2018) for post surgical evaluation. Patient Instructions Dr. Carmelo Zazueta Pre-operative Instructions: 
 
 
Patient: Nick Rees :  1980 I understand I am to stop taking oral birth control pills, hormonal replacement therapy and all Aspirin, Aspirin containing medications, Non-Steroidal Anti-Inflammatory medications (such as Advil, Aleve, Motrin, Ibuprofen) and or Blood thinner medication such as Coumadin, Plavix, Heparin or others 5 days prior to surgery. I understand I am to STOP taking these Medications 5 days prior to surgery: 
I am to get instructions from my prescribing physician. 1. __As listed above_______________________ 2. _____________________________________ 3. _____________________________________ 4. _____________________________________ I understand that if I am taking daily medications for high blood pressure, I can take them the morning of surgery with a small sip of water. I will consult my prescribing physician or call [facility] with specific questions. I also understand that: ? I am to report important observations or changes that may occur prior to surgery. If I have any changes in my physical condition, such as a rash, a fever, sore throat, abscess, ulcers, nausea, vomiting, or diarrhea. I am to call the office and I am to consult my primary care physician to assess and treat the problem. ? I am not to eat or drink anything after midnight the night before my surgery. ? I am not to drink alcoholic beverages 24 hours prior to surgery. ? I am not to do any illegal drugs prior to surgery. ? I am not to smoke at least 24 hours prior to surgery. ? I am able and will shower or bathe before surgery. I will use the Hibiclens solution on my surgical site only. The hibiclens directions are one packet a day starting two days before surgery. ? I will remove any nail polish, make-up or jewelry prior to arriving for my surgery. ? If I wear glasses, contact lenses or dentures they must be removed prior to going to the operating room. ? All body piercing and artifical eye-lashes must be removed prior to surgery ? I will not wear any aerosol sprays, perfumes or skin creams. ? I am to make arrangements for a family member or friend to accompany me to surgery and take me home after my surgery as I will not be allowed to leave the hospital alone. A cab or bus will not be acceptable. Please make arrange for someone to stay with you for 24 hours after surgery. ? Patient has expressed understanding of the diagnosis, treatment and planned surgery Surgery: What to Expect at [facility] Your Recovery This care sheet gives you a general idea about how long it will take for you to recover from your surgery. But each person recovers at a different pace. How can you care for yourself at home? Activity · Allow your body to heal. Don't move quickly or lift anything heavy until you are feeling better. · Rest when you feel tired.  
· Your doctor may give you specific instructions on when you can do your normal activities again, such as driving and going back to work. · Be active. Walking is a good choice. Diet · You can eat your normal diet when you feel well. If your stomach is upset, try bland, low-fat foods like plain rice, broiled chicken, toast, and yogurt. · If your bowel movements are not regular right after surgery, try to avoid constipation and straining. Drink plenty of water. Your doctor may suggest fiber, a stool softener, or a mild laxative. Medicines · Your doctor will tell you if and when you can restart your medicines. He or she will also give you instructions about taking any new medicines. · If you take blood thinners, such as warfarin (Coumadin), clopidogrel (Plavix), or aspirin, be sure to talk to your doctor. He or she will tell you if and when to start taking those medicines again. Make sure that you understand exactly what your doctor wants you to do. · Be safe with medicines. Read and follow all instructions on the label. ¨ If the doctor gave you a prescription medicine for pain, take it as prescribed. ¨ If you are not taking a prescription pain medicine, ask your doctor if you can take an over-the-counter medicine. Incision care · You will have a dressing over the cut (incision). A dressing helps the incision heal and protects it. Your doctor will tell you how to take care of this. · If you have strips of tape on the cut the doctor made, leave the tape on for a week or until it falls off. · If you had stitches, your doctor will tell you when to come back to have them removed. · If you have skin adhesive on the cut, leave it on until it falls off. Skin adhesive is also called liquid stitches. · Change the bandage every day. · Wash the area daily with warm, soapy water, and pat it dry. Don't use hydrogen peroxide or alcohol. They can slow healing. · You may cover the area with a gauze bandage if it oozes fluid or rubs against clothing. · You may shower 24 to 48 hours after surgery. Pat the incision dry. Don't swim or take a bath for the first 2 weeks, or until your doctor tells you it is okay. Follow-up care is a key part of your treatment and safety. Be sure to make and go to all appointments, and call your doctor if you are having problems. It's also a good idea to know your test results and keep a list of the medicines you take. When should you call for help? Call 911 anytime you think you may need emergency care. For example, call if: 
· You passed out (lost consciousness). · You have severe trouble breathing. · You have sudden chest pain and shortness of breath, or you cough up blood. Call your doctor now or seek immediate medical care if: 
· You have pain that does not get better after you take pain medicine. · You have loose stitches, or your incision comes open. · You are bleeding through your dressing. A small amount of blood is normal. 
· You have signs of infection, such as: 
¨ Increased pain, swelling, warmth, or redness. ¨ Red streaks leading from the incision. ¨ Pus draining from the incision. ¨ A fever. · You have symptoms of a blood clot in your arm or leg (called a deep vein thrombosis). These may include: 
¨ Pain in your calf, back of the knee, thigh, or groin. ¨ Redness and swelling in the arm, leg, or groin. Watch closely for any changes in your health, and be sure to contact your doctor if: 
· You do not have a bowel movement after taking a laxative. Where can you learn more? Go to http://petar-regis.info/ Enter O196 in the search box to learn more about \"Surgery: What to Expect at Home. \" 
© 6960-7051 Healthwise, Incorporated. Care instructions adapted under license by [employer] (which disclaims liability or warranty for this information).  This care instruction is for use with your licensed healthcare professional. If you have questions about a medical condition or this instruction, always ask your healthcare professional. [address] any warranty or liability for your use of this information. Content Version: 31.0.587608; Current as of: November 20, 2015 Acute Pain After Surgery: Care Instructions Your Care Instructions It's common to have some pain after surgery. Pain doesn't mean that something is wrong or that the surgery didn't go well. But when the pain is severe, it's important to work with your doctor to manage it. It's also important to be aware of a few facts about pain and pain medicine. · You are the only person who knows what your pain feels like. So be sure to tell your doctor when you are in pain or when the pain changes. Then he or she will know how to adjust your medicines. · Pain is often easier to control right after it starts. So it may be better to take regular doses of pain medicine and not wait until the pain gets bad. · Medicine can help control pain. But this doesn't mean you'll have no pain. Medicine works to keep the pain at a level you can live with. With time, you will feel better. Follow-up care is a key part of your treatment and safety. Be sure to make and go to all appointments, and call your doctor if you are having problems. It's also a good idea to know your test results and keep a list of the medicines you take. How can you care for yourself at home? · Be safe with medicines. Read and follow all instructions on the label. ¨ If the doctor gave you a prescription medicine for pain, take it as prescribed. ¨ If you are not taking a prescription pain medicine, ask your doctor if you can take an over-the-counter medicine. · If you take an over-the-counter pain medicine, such as acetaminophen (Tylenol), ibuprofen (Advil, Motrin), or naproxen (Aleve), read and follow all instructions on the label. · Do not take two or more pain medicines at the same time unless the doctor told you to. · Do not drink alcohol while you are taking pain medicines. · Try to walk each day if your doctor recommends it. Start by walking a little more than you did the day before. Bit by bit, increase the amount you walk. Walking increases blood flow. It also helps prevent pneumonia and constipation. · To prevent constipation from opioid pain medicines: ¨ Talk to your doctor about a laxative. ¨ Include fruits, vegetables, beans, and whole grains in your diet each day. These foods are high in fiber. ¨ Drink plenty of fluids, enough so that your urine is light yellow or clear like water. Drink water, fruit juice, or other drinks that do not contain caffeine or alcohol. If you have kidney, heart, or liver disease and have to limit fluids, talk with your doctor before you increase the amount of fluids you drink. ¨ Take a fiber supplement, such as Citrucel or Metamucil, every day if needed. Read and follow all instructions on the label. If you take pain medicine for more than a few days, talk to your doctor before you take fiber. When should you call for help? Call your doctor now or seek immediate medical care if: 
? · Your pain gets worse. ? · Your pain is not controlled by medicine. ? Watch closely for changes in your health, and be sure to contact your doctor if you have any problems. Where can you learn more? Go to http://petar-regis.info/. Enter ([phone] 987-592 in the search box to learn more about \"Acute Pain After Surgery: Care Instructions. \" Current as of: March 20, 2017 Content Version: 11.4 © 4286-0453 [employer]. Care instructions adapted under license by [employer] (which disclaims liability or warranty for this information). If you have questions about a medical condition or this instruction, always ask your healthcare professional. [address] any warranty or liability for your use of this information. [address] Introducing [facility] & HE[facility]! [facility] introduces [employer] patient portal. Now you can access parts of your medical record, email your doctor's office, and request medication refills online. 1. In your internet browser, go to [email or web address] 2. Click on the First Time User? Click Here link in the Sign In box. You will see the New Member Sign Up page. 3. Enter your [employer] Access Code exactly as it appears below. You will not need to use this code after youve completed the sign-up process. If you do not sign up before the expiration date, you must request a new code. · [employer] Access Code: [email or web address] Expires: 7/25/2018  5:47 PM 
 
4. Enter the last four digits of your Social Security Number (xxxx) and Date of Birth (mm/dd/yyyy) as indicated and click Submit. You will be taken to the next sign-up page. 5. Create a [employer] ID. This will be your [employer] login ID and cannot be changed, so think of one that is secure and easy to remember. 6. Create a [employer] password. You can change your password at any time. 7. Enter your Password Reset Question and Answer. This can be used at a later time if you forget your password. 8. Enter your e-mail address. You will receive e-mail notification when new information is available in [address] 9. Click Sign Up. You can now view and download portions of your medical record. 10. Click the Download Summary menu link to download a portable copy of your medical information. If you have questions, please visit the Frequently Asked Questions section of the [employer] website. Remember, [employer] is NOT to be used for urgent needs. For medical emergencies, dial 911. Now available from your iPhone and Android! Please provide this summary of care documentation to your next provider. Your primary care clinician is listed as Porter Esteves.  If you have any questions after today's visit, please call [phone].

## 2018-05-24 ENCOUNTER — DOCUMENTATION ONLY (OUTPATIENT)
Dept: ORTHOPEDIC SURGERY | Age: 38
End: 2018-05-24

## 2018-05-24 NOTE — PROGRESS NOTES
JAMILA Hu has asked that I send EKG to Anesthesia to make sure that Melodi Landau is ok for surgery. I sent email to everyone in PAT at [facility] and asked them to ask anesthesia to look at EKG  [facility] sent email back saying EKG was ok for surgery per Dr. Marija Dejesus.

## 2018-05-30 ENCOUNTER — HOSPITAL ENCOUNTER (OUTPATIENT)
Dept: LAB | Age: 38
Discharge: HOME | End: 2018-05-30
Payer: COMMERCIAL

## 2018-05-30 DIAGNOSIS — Z01.811 PRE-OPERATIVE RESPIRATORY EXAMINATION: ICD-10-CM

## 2018-05-30 LAB
APPEARANCE UR: CLEAR
BACTERIA URNS QL MICRO: ABNORMAL /HPF
BILIRUB UR QL: NEGATIVE
COLOR UR: YELLOW
EPITH CASTS URNS QL MICRO: ABNORMAL /LPF (ref 0–5)
GLUCOSE UR STRIP.AUTO-MCNC: NEGATIVE MG/DL
HGB UR QL STRIP: NEGATIVE
KETONES UR QL STRIP.AUTO: NEGATIVE MG/DL
LEUKOCYTE ESTERASE UR QL STRIP.AUTO: ABNORMAL
MUCOUS THREADS URNS QL MICRO: ABNORMAL /LPF
NITRITE UR QL STRIP.AUTO: NEGATIVE
PH UR STRIP: 7.5 [PH] (ref 5–8)
PROT UR STRIP-MCNC: NEGATIVE MG/DL
RBC #/AREA URNS HPF: ABNORMAL /HPF (ref 0–5)
SP GR UR REFRACTOMETRY: 1.03 (ref 1–1.03)
UROBILINOGEN UR QL STRIP.AUTO: 1 EU/DL (ref 0.2–1)
WBC URNS QL MICRO: ABNORMAL /HPF (ref 0–5)

## 2018-05-30 PROCEDURE — 36415 COLL VENOUS BLD VENIPUNCTURE: CPT | Performed by: PHYSICIAN ASSISTANT

## 2018-05-30 PROCEDURE — 81001 URINALYSIS AUTO W/SCOPE: CPT | Performed by: PHYSICIAN ASSISTANT

## 2018-05-30 PROCEDURE — 87086 URINE CULTURE/COLONY COUNT: CPT | Performed by: PHYSICIAN ASSISTANT

## 2018-05-31 ENCOUNTER — ANESTHESIA EVENT (OUTPATIENT)
Dept: SURGERY | Age: 38
End: 2018-05-31
Payer: OTHER GOVERNMENT

## 2018-05-31 LAB
BACTERIA SPEC CULT: NORMAL
SERVICE CMNT-IMP: NORMAL

## 2018-06-01 ENCOUNTER — ANESTHESIA (OUTPATIENT)
Dept: SURGERY | Age: 38
End: 2018-06-01
Payer: OTHER GOVERNMENT

## 2018-06-01 ENCOUNTER — HOSPITAL ENCOUNTER (OUTPATIENT)
Age: 38
Setting detail: OUTPATIENT SURGERY
Discharge: HOME | End: 2018-06-01
Attending: ORTHOPAEDIC SURGERY | Admitting: ORTHOPAEDIC SURGERY
Payer: OTHER GOVERNMENT

## 2018-06-01 VITALS
WEIGHT: 234 LBS | HEART RATE: 57 BPM | OXYGEN SATURATION: 98 % | SYSTOLIC BLOOD PRESSURE: 114 MMHG | TEMPERATURE: 97 F | HEIGHT: 69 IN | RESPIRATION RATE: 16 BRPM | BODY MASS INDEX: 34.66 KG/M2 | DIASTOLIC BLOOD PRESSURE: 76 MMHG

## 2018-06-01 LAB — HCG UR QL: NEGATIVE

## 2018-06-01 PROCEDURE — 77030018836 HC SOL IRR NACL ICUM -A: Performed by: ORTHOPAEDIC SURGERY

## 2018-06-01 PROCEDURE — 74011000258 HC RX REV CODE- 258: Performed by: PHYSICIAN ASSISTANT

## 2018-06-01 PROCEDURE — 74011250636 HC RX REV CODE- 250/636

## 2018-06-01 PROCEDURE — 77030011640 HC PAD GRND REM COVD -A: Performed by: ORTHOPAEDIC SURGERY

## 2018-06-01 PROCEDURE — 77030019605: Performed by: ORTHOPAEDIC SURGERY

## 2018-06-01 PROCEDURE — 74011000250 HC RX REV CODE- 250: Performed by: NURSE ANESTHETIST, CERTIFIED REGISTERED

## 2018-06-01 PROCEDURE — 77030020782 HC GWN BAIR PAWS FLX 3M -B: Performed by: ORTHOPAEDIC SURGERY

## 2018-06-01 PROCEDURE — 77030000032 HC CUF TRNQT ZIMM -B: Performed by: ORTHOPAEDIC SURGERY

## 2018-06-01 PROCEDURE — 74011000250 HC RX REV CODE- 250: Performed by: PHYSICIAN ASSISTANT

## 2018-06-01 PROCEDURE — 77030002916 HC SUT ETHLN J&J -A: Performed by: ORTHOPAEDIC SURGERY

## 2018-06-01 PROCEDURE — 77030003666 HC NDL SPINAL BD -A: Performed by: ORTHOPAEDIC SURGERY

## 2018-06-01 PROCEDURE — 74011250637 HC RX REV CODE- 250/637: Performed by: NURSE ANESTHETIST, CERTIFIED REGISTERED

## 2018-06-01 PROCEDURE — 77030002974 HC SUT PLN J&J -A: Performed by: ORTHOPAEDIC SURGERY

## 2018-06-01 PROCEDURE — 76010000153 HC OR TIME 1.5 TO 2 HR: Performed by: ORTHOPAEDIC SURGERY

## 2018-06-01 PROCEDURE — 76210000006 HC OR PH I REC 0.5 TO 1 HR: Performed by: ORTHOPAEDIC SURGERY

## 2018-06-01 PROCEDURE — 77030031139 HC SUT VCRL2 J&J -A: Performed by: ORTHOPAEDIC SURGERY

## 2018-06-01 PROCEDURE — 76210000021 HC REC RM PH II 0.5 TO 1 HR: Performed by: ORTHOPAEDIC SURGERY

## 2018-06-01 PROCEDURE — 64445 NJX AA&/STRD SCIATIC NRV IMG: CPT | Performed by: ANESTHESIOLOGY

## 2018-06-01 PROCEDURE — 77030020753 HC CUF TRNQT 1BLA STRY -B: Performed by: ORTHOPAEDIC SURGERY

## 2018-06-01 PROCEDURE — 76060000034 HC ANESTHESIA 1.5 TO 2 HR: Performed by: ORTHOPAEDIC SURGERY

## 2018-06-01 PROCEDURE — 88304 TISSUE EXAM BY PATHOLOGIST: CPT | Performed by: ORTHOPAEDIC SURGERY

## 2018-06-01 PROCEDURE — 74011000250 HC RX REV CODE- 250

## 2018-06-01 PROCEDURE — 77030010509 HC AIRWY LMA MSK TELE -A: Performed by: ANESTHESIOLOGY

## 2018-06-01 PROCEDURE — 77030013079 HC BLNKT BAIR HGGR 3M -A: Performed by: ANESTHESIOLOGY

## 2018-06-01 PROCEDURE — 81025 URINE PREGNANCY TEST: CPT

## 2018-06-01 PROCEDURE — 77030008462 HC STPLR SKN PROX J&J -A: Performed by: ORTHOPAEDIC SURGERY

## 2018-06-01 PROCEDURE — 77030032490 HC SLV COMPR SCD KNE COVD -B: Performed by: ORTHOPAEDIC SURGERY

## 2018-06-01 PROCEDURE — 76942 ECHO GUIDE FOR BIOPSY: CPT | Performed by: ANESTHESIOLOGY

## 2018-06-01 PROCEDURE — 74011250636 HC RX REV CODE- 250/636: Performed by: PHYSICIAN ASSISTANT

## 2018-06-01 PROCEDURE — 74011250636 HC RX REV CODE- 250/636: Performed by: NURSE ANESTHETIST, CERTIFIED REGISTERED

## 2018-06-01 RX ORDER — ROPIVACAINE HYDROCHLORIDE 2 MG/ML
30 INJECTION, SOLUTION EPIDURAL; INFILTRATION; PERINEURAL
Status: DISCONTINUED | OUTPATIENT
Start: 2018-06-01 | End: 2018-06-01 | Stop reason: HOSPADM

## 2018-06-01 RX ORDER — NALOXONE HYDROCHLORIDE 0.4 MG/ML
0.1 INJECTION, SOLUTION INTRAMUSCULAR; INTRAVENOUS; SUBCUTANEOUS ONCE
Status: DISCONTINUED | OUTPATIENT
Start: 2018-06-01 | End: 2018-06-01 | Stop reason: HOSPADM

## 2018-06-01 RX ORDER — SODIUM CHLORIDE, SODIUM LACTATE, POTASSIUM CHLORIDE, CALCIUM CHLORIDE 600; 310; 30; 20 MG/100ML; MG/100ML; MG/100ML; MG/100ML
75 INJECTION, SOLUTION INTRAVENOUS CONTINUOUS
Status: DISCONTINUED | OUTPATIENT
Start: 2018-06-01 | End: 2018-06-01 | Stop reason: HOSPADM

## 2018-06-01 RX ORDER — SODIUM CHLORIDE 0.9 % (FLUSH) 0.9 %
5-10 SYRINGE (ML) INJECTION EVERY 8 HOURS
Status: DISCONTINUED | OUTPATIENT
Start: 2018-06-01 | End: 2018-06-01 | Stop reason: HOSPADM

## 2018-06-01 RX ORDER — FENTANYL CITRATE 50 UG/ML
50 INJECTION, SOLUTION INTRAMUSCULAR; INTRAVENOUS AS NEEDED
Status: DISCONTINUED | OUTPATIENT
Start: 2018-06-01 | End: 2018-06-01 | Stop reason: HOSPADM

## 2018-06-01 RX ORDER — LIDOCAINE HYDROCHLORIDE 20 MG/ML
INJECTION, SOLUTION EPIDURAL; INFILTRATION; INTRACAUDAL; PERINEURAL AS NEEDED
Status: DISCONTINUED | OUTPATIENT
Start: 2018-06-01 | End: 2018-06-01 | Stop reason: HOSPADM

## 2018-06-01 RX ORDER — FENTANYL CITRATE 50 UG/ML
INJECTION, SOLUTION INTRAMUSCULAR; INTRAVENOUS AS NEEDED
Status: DISCONTINUED | OUTPATIENT
Start: 2018-06-01 | End: 2018-06-01 | Stop reason: HOSPADM

## 2018-06-01 RX ORDER — SODIUM CHLORIDE 0.9 % (FLUSH) 0.9 %
5-10 SYRINGE (ML) INJECTION AS NEEDED
Status: DISCONTINUED | OUTPATIENT
Start: 2018-06-01 | End: 2018-06-01 | Stop reason: HOSPADM

## 2018-06-01 RX ORDER — MIDAZOLAM HYDROCHLORIDE 1 MG/ML
INJECTION, SOLUTION INTRAMUSCULAR; INTRAVENOUS AS NEEDED
Status: DISCONTINUED | OUTPATIENT
Start: 2018-06-01 | End: 2018-06-01 | Stop reason: HOSPADM

## 2018-06-01 RX ORDER — ROPIVACAINE HYDROCHLORIDE 5 MG/ML
INJECTION, SOLUTION EPIDURAL; INFILTRATION; PERINEURAL
Status: COMPLETED
Start: 2018-06-01 | End: 2018-06-01

## 2018-06-01 RX ORDER — FAMOTIDINE 20 MG/1
20 TABLET, FILM COATED ORAL ONCE
Status: COMPLETED | OUTPATIENT
Start: 2018-06-01 | End: 2018-06-01

## 2018-06-01 RX ORDER — PROPOFOL 10 MG/ML
INJECTION, EMULSION INTRAVENOUS AS NEEDED
Status: DISCONTINUED | OUTPATIENT
Start: 2018-06-01 | End: 2018-06-01 | Stop reason: HOSPADM

## 2018-06-01 RX ORDER — SODIUM CHLORIDE, SODIUM LACTATE, POTASSIUM CHLORIDE, CALCIUM CHLORIDE 600; 310; 30; 20 MG/100ML; MG/100ML; MG/100ML; MG/100ML
100 INJECTION, SOLUTION INTRAVENOUS CONTINUOUS
Status: DISCONTINUED | OUTPATIENT
Start: 2018-06-01 | End: 2018-06-01 | Stop reason: HOSPADM

## 2018-06-01 RX ORDER — LIDOCAINE HYDROCHLORIDE 10 MG/ML
3 INJECTION, SOLUTION EPIDURAL; INFILTRATION; INTRACAUDAL; PERINEURAL ONCE
Status: COMPLETED | OUTPATIENT
Start: 2018-06-01 | End: 2018-06-01

## 2018-06-01 RX ORDER — ROPIVACAINE HYDROCHLORIDE 5 MG/ML
20 INJECTION, SOLUTION EPIDURAL; INFILTRATION; PERINEURAL
Status: COMPLETED | OUTPATIENT
Start: 2018-06-01 | End: 2018-06-01

## 2018-06-01 RX ORDER — FENTANYL CITRATE 50 UG/ML
100 INJECTION, SOLUTION INTRAMUSCULAR; INTRAVENOUS ONCE
Status: COMPLETED | OUTPATIENT
Start: 2018-06-01 | End: 2018-06-01

## 2018-06-01 RX ORDER — MIDAZOLAM HYDROCHLORIDE 1 MG/ML
2 INJECTION, SOLUTION INTRAMUSCULAR; INTRAVENOUS ONCE
Status: COMPLETED | OUTPATIENT
Start: 2018-06-01 | End: 2018-06-01

## 2018-06-01 RX ADMIN — MIDAZOLAM HYDROCHLORIDE 2 MG: 1 INJECTION, SOLUTION INTRAMUSCULAR; INTRAVENOUS at 07:12

## 2018-06-01 RX ADMIN — FAMOTIDINE 20 MG: 20 TABLET ORAL at 06:50

## 2018-06-01 RX ADMIN — SODIUM CHLORIDE, SODIUM LACTATE, POTASSIUM CHLORIDE, AND CALCIUM CHLORIDE 75 ML/HR: 600; 310; 30; 20 INJECTION, SOLUTION INTRAVENOUS at 06:38

## 2018-06-01 RX ADMIN — LIDOCAINE HYDROCHLORIDE 3 ML: 10 INJECTION, SOLUTION EPIDURAL; INFILTRATION; INTRACAUDAL; PERINEURAL at 07:11

## 2018-06-01 RX ADMIN — PROPOFOL 160 MG: 10 INJECTION, EMULSION INTRAVENOUS at 07:33

## 2018-06-01 RX ADMIN — FENTANYL CITRATE 100 MCG: 50 INJECTION INTRAMUSCULAR; INTRAVENOUS at 07:14

## 2018-06-01 RX ADMIN — SODIUM CHLORIDE 600 MG: 900 INJECTION, SOLUTION INTRAVENOUS at 07:30

## 2018-06-01 RX ADMIN — LIDOCAINE HYDROCHLORIDE 40 MG: 20 INJECTION, SOLUTION EPIDURAL; INFILTRATION; INTRACAUDAL; PERINEURAL at 07:33

## 2018-06-01 RX ADMIN — SODIUM CHLORIDE, SODIUM LACTATE, POTASSIUM CHLORIDE, AND CALCIUM CHLORIDE: 600; 310; 30; 20 INJECTION, SOLUTION INTRAVENOUS at 08:15

## 2018-06-01 RX ADMIN — SODIUM CHLORIDE, SODIUM LACTATE, POTASSIUM CHLORIDE, AND CALCIUM CHLORIDE: 600; 310; 30; 20 INJECTION, SOLUTION INTRAVENOUS at 07:00

## 2018-06-01 RX ADMIN — FENTANYL CITRATE 50 MCG: 50 INJECTION, SOLUTION INTRAMUSCULAR; INTRAVENOUS at 07:33

## 2018-06-01 RX ADMIN — ROPIVACAINE HYDROCHLORIDE 100 MG: 5 INJECTION, SOLUTION EPIDURAL; INFILTRATION; PERINEURAL at 07:15

## 2018-06-01 RX ADMIN — MIDAZOLAM HYDROCHLORIDE 2 MG: 1 INJECTION, SOLUTION INTRAMUSCULAR; INTRAVENOUS at 07:27

## 2018-06-01 NOTE — BRIEF OP NOTE
BRIEF OPERATIVE NOTE    Date of Procedure: 6/1/2018   Preoperative Diagnosis: M65.871 RIGHT ANKLE TENOSYNOVITIS, , PERONEAL BREVIS TEAR/TENDINOPATHY  Postoperative Diagnosis:  As above  Procedure(s):  RIGHT PERONEAL BREVIS AND LONGUS TENDON TENOSYNOVECTOMIES   PRIMARY REPAIR OF PERONEAL BREVIS TENDON/NERVE BLOCK  EXCISION OF BUTTON HOLE CENTRAL PERONEAL TENDON TEAR    Surgeon(s) and Role:     * Mervat Mccarty MD - Primary         Surgical Assistant:  Aura MARINO    Surgical Staff:  Circ-1: Mya Narvaez RN  Scrub Tech-1: Rylie Bob  Surg Asst-1: Himanshu MARINO:  ASSISTANT  IV FLUIDS:   1000 ml IVF  Tourniquet Time:  40 minutes at  300  Mm HG   No case tracking events are documented in the log.   Anesthesia: General   Estimated Blood Loss: 5 ML  Specimens: * No specimens in log *   Findings: CENTRAL ELLIPTICAL BUTTON HOLE TYPE TEAR TO THE PERONEAL BREVIS TENDON   Complications: NONE  Implants: * No implants in log *

## 2018-06-01 NOTE — ANESTHESIA POSTPROCEDURE EVALUATION
Post-Anesthesia Evaluation and Assessment    Patient: Randy Shukla MRN: [id number]  SSN: [id number]    YOB: 1980  Age: 40 y.o. Sex: female     VS from flow sheet    Cardiovascular Function/Vital Signs  Visit Vitals    /72    Pulse 64    Temp 36.2 °C (97.1 °F)    Resp 17    Ht 5' 9\" (1.753 m)    Wt 106.1 kg (234 lb)    SpO2 99%    BMI 34.56 kg/m2       Patient is status post general anesthesia for Procedure(s):  RIGHT PERONEAL TENOSYNOVECTOMY AND REPAIR OF PERONEAL brevis TENDON/NERVE BLOCK. Nausea/Vomiting: None    Postoperative hydration reviewed and adequate. Pain:  Pain Scale 1: Numeric (0 - 10) (06/01/18 0932)  Pain Intensity 1: 0 (06/01/18 0932)   Managed    Neurological Status:   Neuro (WDL): Within Defined Limits (06/01/18 0906)   At baseline    Mental Status and Level of Consciousness: Arousable    Pulmonary Status:   O2 Device: Room air (06/01/18 9375)   Adequate oxygenation and airway patent    Complications related to anesthesia: None    Post-anesthesia assessment completed.  No concerns    Signed By: Mela Sue MD     June 1, 2018

## 2018-06-01 NOTE — INTERVAL H&P NOTE
H&P Update:  Jessi Mack was seen and examined. History and physical has been reviewed. The patient has been examined. There have been no significant clinical changes since the completion of the originally dated History and Physical.    Patient identified by surgeon; surgical site was confirmed by patient and surgeon.     Signed By: Mary Maciel MD     June 1, 2018 6:57 AM

## 2018-06-01 NOTE — H&P (VIEW-ONLY)
FOOT AND ANKLE HISTORY AND PHYSICAL      Patient: Alfredo Arguelles                   MRN: [id number]         SSN: [id number]  YOB: 1980               AGE: 40 y.o. SEX: female    Patient scheduled for:  Exploration of the right peroneal brevis and longus tendons, tenosynovectomy of the peroneal brevis and longus tendons, possible excisional debridement and repair of the peroneal brevis tendon, possible tenodesis of the peroneal brevis to peroneal longus tendon. Date of surgery: 6/1/18   [facility]   Surgeon: Mak Ashley MD  ANESTHESIA TYPE:  General, Popliteal block          PRESCRIPTIONS AND/OR ORDERS PROVIDED DURING H&P:    Orders Placed This Encounter    Generic Supply Order    CULTURE, URINE    URINALYSIS W/ RFLX MICROSCOPIC    trimethoprim-sulfamethoxazole (BACTRIM DS, SEPTRA DS) 160-800 mg per tablet    HYDROcodone-acetaminophen (NORCO) 7.5-325 mg per tablet    polyethylene glycol (MIRALAX) 17 gram packet    promethazine (PHENERGAN) 25 mg tablet    fluconazole (DIFLUCAN) 150 mg tablet              HISTORY:     The patient was seen in the office today for a preoperative history and physical for an upcoming above listed surgery. The patient is a pleasant 40 y.o. female who has a history of chronic tendinitis that has been present for about 15 years. Additionally, she reports having instability in her ankles. MR imaging findings from 5/8/18 showed a possible tear of one of her peroneal tendons. She denies h/o fibromyalgia or other musculoskeletal disorders. Intermittently, she can experience numbness along her right foot. In reviewing her MRI with and examining her, she has tenderness along the posterolateral border of her fibula along the peroneal tendons and had some swelling in this region. Because of this degenerative signal or abnormal signal, surgical intervention is recommended.     Due to the current findings, affected activity of daily living and continued pain and discomfort, surgical intervention is indicated. The alternatives, risks, and complications, including but not limited to infection, blood loss, need for blood transfusion, neurovascular damage, maggie-incisional numbness, subcutaneous hematoma, bone fracture, anesthetic complications, DVT, PE, death, RSD, postoperative stiffness and pain, possible surgical scar, delayed healing and nonhealing, reflexive sympathetic dystrophy, damage to blood vessels and nerves, need for more surgery, MI, and stroke have been discussed. The patient understands and wishes to proceed with surgery. Patient can expect to be out of work for approximately 6 - 8 weeks following surgery. Because she has decreased monofilament testing on her right foot, dorsal, plantar, and in the DPN nerve distribution, as well as along the toes and plantar portion of her right forefoot along the medial plantar nerve distribution, EMG nerve conduction studies of her bilateral lower extremities were recommended and completed on 5/21/18. The results will be ready on 5/28/18. PAST MEDICAL HISTORY:     Past Medical History:   Diagnosis Date    Anemia     Chronic pain     knees and ankles    GERD (gastroesophageal reflux disease)     during pregancy    Headache     Hemorrhoid     History of body piercing     Hypercholesterolemia     IUD (intrauterine device) in place     Migraine headache     SIFUENTES (nonalcoholic steatohepatitis) (per outside records) 6/11/2017    denies liver disease 5/22/2018    Tendinitis        CURRENT MEDICATIONS:     Current Outpatient Prescriptions   Medication Sig Dispense Refill    trimethoprim-sulfamethoxazole (BACTRIM DS, SEPTRA DS) 160-800 mg per tablet Take 1 Tab by mouth two (2) times a day for 7 days.  14 Tab 0    HYDROcodone-acetaminophen (NORCO) 7.5-325 mg per tablet TAKE ONE TO TWO TABLETS BY MOUTH Q 4 - 6 HRS PRN PAIN **AFTER SURGERY**DO NOT TAKE BEFORE SURGERY Indications: Pain 50 Tab 0    polyethylene glycol (MIRALAX) 17 gram packet Take 1 Packet by mouth daily. Mix in 8 oz prune juice 10 Packet 1    promethazine (PHENERGAN) 25 mg tablet Take 1 Tab by mouth every six (6) hours as needed for Nausea. 30 Tab 0    fluconazole (DIFLUCAN) 150 mg tablet Take 1 Tab by mouth daily for 1 day. 1 Tab 1    loratadine (CLARITIN) 10 mg tablet Take 10 mg by mouth daily as needed for Allergies.  triamcinolone (NASACORT) 55 mcg nasal inhaler 2 Sprays daily as needed.  multivitamin (ONE A DAY) tablet Take 1 Tab by mouth daily.  naproxen (NAPROSYN) 500 mg tablet Take 1 Tab by mouth two (2) times daily (with meals). (Patient taking differently: Take 500 mg by mouth two (2) times daily as needed.) 14 Tab 0    cholecalciferol, VITAMIN D3, (VITAMIN D3) 5,000 unit tab tablet Take  by mouth every Wednesday.          ALLERGIES:     Allergies   Allergen Reactions    Pcn [Penicillins] Hives, Swelling and Other (comments)     Throat closes    Shellfish Derived Hives, Swelling and Other (comments)     Throat closes         SURGICAL HISTORY:     Past Surgical History:   Procedure Laterality Date    HX OTHER SURGICAL      Tooth extraction (molars)       SOCIAL HISTORY:     Social History     Social History    Marital status: UNKNOWN     Spouse name: N/A    Number of children: N/A    Years of education: N/A     Social History Main Topics    Smoking status: Never Smoker    Smokeless tobacco: Never Used    Alcohol use Yes      Comment: rare    Drug use: No    Sexual activity: Not Asked     Other Topics Concern    None     Social History Narrative       FAMILY HISTORY:     Family History   Problem Relation Age of Onset    Diabetes Mother     Hypertension Mother     Bipolar Disorder Mother     Elevated Lipids Father     COPD Maternal Grandmother     Emphysema Maternal Grandmother     Lung Disease Maternal Grandmother     No Known Problems Maternal Grandfather     Stroke Paternal Grandmother     No Known Problems Paternal Grandfather     Allergic Rhinitis Daughter     Eczema Daughter     Headache Daughter        REVIEW OF SYSTEMS:     Negative for fevers, chills, chest pain, shortness of breath, weight loss, recent illness     General: Negative for fever and chills. No unexpected change in weight. Denies fatigue. No change in appetite. Skin: Negative for rash or itching. HEENT: Negative for congestion, sore throat, neck pain and neck stiffness. No change in vision or hearing. Hasn't noted any enlarged lymph nodes in the neck. Cardiovascular:  Negative for chest pain and palpitations. Has not noted pedal edema. Respiratory: Negative for cough, colds, sinus, hemoptysis, shortness of breath and wheezing. Gastrointestinal: Negative for nausea and vomiting, rectal bleeding, coffee ground emesis, abdominal pain, diarrhea and constipation. Genitourinary: Negative for dysuria, frequency urgency, or burning on micturition. No flank pain, no foul smelling urine, no difficulty with initiating urination. Hematological: Negative for bleeding or easy bruising. Musculoskeletal: Negative  for arthralgias, back pain or neck pain. Neurological: Negative for dizziness, seizures or syncopal episodes. Denies headaches. Endocrine: Denies excessive thirst.  No heat/cold intolerance. Psychiatric: Negative for depression or insomnia. PHYSICAL EXAMINATION:     VITALS:   Visit Vitals    /85    Pulse 80    Temp 98.6 °F (37 °C) (Oral)    Resp 16    Ht 5' 9\" (1.753 m)    Wt 234 lb 3.2 oz (106.2 kg)    SpO2 97%    BMI 34.59 kg/m2       GEN:  Well developed, well nourished 40 y.o. female in no acute distress. PSYCH: Alert an oriented to person, place and time. Mood, memory, affect, behavior and judgment normal  HEENT: Normocephalic and atraumatic. Eyes: Conjunctivae and EOM are normal.Pupils are equal, round, and reactive to light.  External ear normal appearance, external nose normal appearing. Mouth/Throat: Oropharynx is clear and moist, able to handle oral secretions w/out difficulty, airway patent  NECK: Supple. Normal ROM, No lymphadenopathy. Trachea is midline. No bruising, swelling or deformity  RESP: Clear to auscultation bilaterally. No wheezes, rales, rhonchi. Normal effort and breath sounds. No respiratory distress  CARDIO: Normal rate, regular rhythm and normal heart sounds. No MGR. ABDOMEN: Soft, non-tender, non-distended, normoactive bowel sounds in all four quadrants. There is no tenderness. There is no rebound and no guarding. BACK: No CVA or spinal tenderness  BREAST:  Deferred  PELVIC:    Deferred   RECTAL:  Deferred   :           Deferred  EXTREMITIES: EXAMINATION OF: Right ANKLE and FOOT       Gait: slow  Tenderness: Moderate tenderness along her noninsertional Achilles' tendon (8 cm from insertional point with ankle in full DF)    Moderate tenderness to peroneal tendons. Cutaneous: No rashes, skin patches, wounds, or abrasions to the lower legs           Warm and Normal color. No regions of expressible drainage. Medial Border of Tibia Region: absent           Skin color, texture, turgor normal. Normal.           Mild swelling to the PL fibula. Joint Motion: ROM Ankle:Normal , Hindfoot: (ST,TN,CC Normal}, Forefoot toes:Normal  Neurologic Exam: Neuro: Motor: normal 5/5 strength in all tested muscle groups             Sensory : diminished sensation on monofilament test.  Contractures: Gastrocnemius or Achilles Contractures absent  Joint / Tendon Stability: Not tested due to guarding. Alignment: Normal foot alignment and  Flexible  Vascular: Normal Pulses/ NL Capillary refill, No evidence of DVT seen on physical exam.   No calf swelling, no tenderness to calf muscles. Lymphatic:  No Evidence of Lymphedema.       RADIOGRAPHS & DIAGNOSTIC STUDIES:      MRI of the right ankle completed at [facility] 5/8/18 revealed:    No evidence of acute fracture or dislocation. There is subtle abnormality to the peroneal brevis tendon concerning for tendinosis, and/or a short segment, longitudinal tear to the peroneal brevis tendon. It showed a small tibial effusion and small subtalar joint effusion. The findings are suggestive of a chronic plantar fasciitis also but no acute component of plantar fasciitis. LABS:     @  CBC:   Lab Results   Component Value Date/Time    WBC 6.8 05/21/2018 01:11 PM    RBC 4.94 05/21/2018 01:11 PM    HGB 14.2 05/21/2018 01:11 PM    HCT 42.1 05/21/2018 01:11 PM    PLATELET 884 98/69/1166 01:11 PM   , CMP:   Lab Results   Component Value Date/Time    Glucose 94 05/21/2018 01:11 PM    Sodium 140 05/21/2018 01:11 PM    Potassium 4.1 05/21/2018 01:11 PM    Chloride 105 05/21/2018 01:11 PM    CO2 26 05/21/2018 01:11 PM    BUN 15 05/21/2018 01:11 PM    Creatinine 0.75 05/21/2018 01:11 PM    Calcium 8.6 05/21/2018 01:11 PM    Anion gap 9 05/21/2018 01:11 PM    BUN/Creatinine ratio 20 05/21/2018 01:11 PM    Alk. phosphatase 96 05/21/2018 01:11 PM    Protein, total 7.4 05/21/2018 01:11 PM    Albumin 3.8 05/21/2018 01:11 PM    Globulin 3.6 05/21/2018 01:11 PM    A-G Ratio 1.1 05/21/2018 01:11 PM    and Coagulation:   Lab Results   Component Value Date/Time    Prothrombin time 11.8 05/21/2018 01:11 PM    INR 0.9 05/21/2018 01:11 PM   @    Preoperative labs were reviewed and are substantially within normal limits with exception of UA which reveal 1+ bacteria and small Leukocyte Esterase - Patient will be treated with Bactrim   Chest X-ray revealed no acute cardiopulmonary process   EKG:     Sinus bradycardia   Nonspecific T wave abnormality   Abnormal ECG   No previous ECGs available   Confirmed by Juan M Adam MD, --- ([id number]) on 5/21/2018 3:05:18 PM       ASSESSMENT:       Encounter Diagnoses   Name Primary?     Tendinitis of right peroneus brevis tendon Yes    Pre-operative respiratory examination     Urinary tract infection without hematuria, site unspecified     Pre-operative cardiovascular examination        PLAN:     Again, the alternatives, risks, and complications, as well as expected outcome were discussed. The patient understands and agrees to proceed with the above listed surgery pending EKG review by anesthesia. Patient has been given Hibiclens wash with instructions and prescriptions and or orders listed above.     Reece Winkler PA-C  5/23/2018  10:26 AM

## 2018-06-01 NOTE — PROGRESS NOTES
conducted a pre-surgery visit with Meghana Escobar, who is a 40 y.o.,female. The  provided the following Interventions:  Initiated a relationship of care and support. Plan:  Chaplains will continue to follow and will provide pastoral care on an as needed/requested basis.  recommends bedside caregivers page  on duty if patient shows signs of acute spiritual or emotional distress.     [address] [facility] Certified [address] [place] [address]   [phone]

## 2018-06-01 NOTE — OP NOTES
Patient: Rafy Noel                MRN:@           SSN: [id number]   YOB: 1980         AGE: 40 y.o. SEX: female       OPERATIVE REPORT    Date of Procedure: 6/1/2018   Preoperative Diagnosis: M65.871 RIGHT ANKLE TENOSYNOVITIS, , PERONEAL BREVIS TEAR/TENDINOPATHY  Postoperative Diagnosis:  As above  Procedure(s):  1. RIGHT PERONEAL BREVIS AND LONGUS TENDON TENOSYNOVECTOMIES   2. PRIMARY REPAIR OF PERONEAL BREVIS TENDON/NERVE BLOCK  3. EXCISION OF BUTTON HOLE CENTRAL PERONEAL TENDON TEAR     Surgeon(s) and Role:     * Simon Correia MD - Primary      Surgical Assistant:  Anthony MARINO     Surgical Staff:  Circ-1: Cristin Mercado RN  Scrub Tech-1: Pradip Salmon  Surg Asst-1: Micah MARINO:  ASSISTANT  IV FLUIDS:   1000 ml IVF  Tourniquet Time:  40 minutes at  300  Mm HG   No case tracking events are documented in the log. Anesthesia: General   Estimated Blood Loss: 5 ML  Specimens: * No specimens in log *   Findings: CENTRAL ELLIPTICAL BUTTON HOLE TYPE TEAR TO THE PERONEAL BREVIS TENDON   Complications: NONE  Implants: * No implants in log *           OPERATIVE NOTE:     Rafy Noel was taken to the operating room today 6/1/2018  and positioned supine initially, and then general anesthesia was conducted. A popliteal block had already been performed to her tight lower extremity prior to bringing her to the operating room by the anesthesia service. Rafy Noel was positioned supine position. A stack of rolled sheet s were placed tot he right hip region so as to internally rotate her right lower extremity. All bony prominences were protected. The entire Right lower extremity was fully prepped with Chlorhexidene Scrub and Chlorhexidene yellow prep stick Prep Stick x 2.      A formal verbal time out was conducted: identifying the patient, identifying the surgical location, reading the informed written signed consent for procedure, confirmation that  the patient did receive preoperative antibiotics and that my signature on the patient was visible at the surgical field. All members of the surgical team agreed to the consent process. An Esmarch was applied to exsanguinate the Right lower extremity and the tourniquet was insufflated to 300 mmHg. Attention was then directed towards the surgery. I made a  8 cm incision midline at the right distal fibula, making my  incision through skin and subcutaneous tissue, directly down to the right fibula. I identified the superior peroneal retinaculum (SPR) sheath and opened the (SPR) sheath . I identified the peroneal brevis and peroneal longus tendon and these were noted to be in this condition: CENTRAL ELLIPTICAL BUTTON HOLE TYPE TEAR TO THE PERONEAL BREVIS TENDON and healthy Normal Peroneal Longus tendon. The patient had a  low-lying peroneal brevis muscle. I gently recessed a small portion of the peroneal brevis muscle, thus decompressing the sheath volume. A tenosynovectomy was conducted to each longus and brevis tendons. I excised the torn degenerative portions on the   peroneal  brevis tendon. I repaired the personal brevis tendon with 3.0 Vicryl suture. THUS,  A tubularization procedure of peroneal brevis tendon was performed with 3.0 VICRYL suture in a running stitch fashion. Next, tourniquet was deflated. Electro cauterization used for hemostasis. I closed the peroneal tendon sheath with  2-0 Vicryl in an figure-of-eight interrupted knot type fashion. I thoroughly irrigated the surgical incision. The personal tendons sat nicely in proper position in retro fibular space. The incision was then closed in layers using 2-0 Vicryl, 2-0 plain, and staples. Prior to closure, correct needle counts were noted, documented on the chart. The patient tolerated the procedure well. Sterile dressings were placed which consisted of Xeroform, 4 x 4's, cast padding.  A carefully molded posterior splint was applied with the ankle in neutral. The patient was extubated and transferred to recovery room in stable condition. There were no complications.                  Patient: Leti Sweet                MRN:@             SSN: [id number]   YOB: 1980         AGE: 40 y.o. SEX: female       America MARINO function was my first assistant in surgery. She assisted me in this surgery by performing the followin. Assist in [address] on the OR table. 2. Providing a constant clear visualization of my operative field by providing timely  suction, operative light optimization of the surgical field  3. Providing appropriate electrocautery, and assisting in incision closure, placement of surgical dressings/ splinting. America MARINO's role facilitated my conducting the surgery more efficiently.       Lisa Medel MD  2018  9:04 AM          Lisa Medel MD  2018  9:02 AM

## 2018-06-01 NOTE — ANESTHESIA PROCEDURE NOTES
Peripheral Block    Start time: 6/1/2018 7:05 AM  End time: 6/1/2018 7:24 AM  Performed by: Janet Valle  Authorized by: Janet Valle       Pre-procedure:    Indications: at surgeon's request and post-op pain management    Preanesthetic Checklist: patient identified, risks and benefits discussed, site marked, timeout performed, anesthesia consent given and patient being monitored    Timeout Time: 07:08          Block Type:   Block Type:  Popliteal  Laterality:  Right  Monitoring:  Standard ASA monitoring, continuous pulse ox, frequent vital sign checks, heart rate, responsive to questions and oxygen  Injection Technique:  Single shot  Procedures: ultrasound guided and nerve stimulator    Patient Position: left lateral decubitus  Prep: chlorhexidine    Location:  Lower thigh  Needle Type:  Stimuplex  Needle Gauge:  21 G  Needle Localization:  Infiltration, ultrasound guidance and nerve stimulator  Motor Response: minimal motor response >0.4 mA    Medication Injected:  0.5%  ropivacaine  Volume (mL):  30  Add'l Medication Injected:  1.0%  lidocaine  Volume (mL):  3    Assessment:  Number of attempts:  1  Injection Assessment:  Incremental injection every 5 mL, local visualized surrounding nerve on ultrasound, negative aspiration for CSF, negative aspiration for blood, no paresthesia, no intravascular symptoms and ultrasound image on chart  Patient tolerance:  Patient tolerated the procedure well with no immediate complications

## 2018-06-01 NOTE — IP AVS SNAPSHOT
[address] [id number] [address] Patient: Nick Rees MRN: [id number] EHW:[id number] About your hospitalization You were admitted on:  June 1, 2018 You last received care in the:  [facility] PHASE 2 RECOVERY You were discharged on:  June 1, 2018 Why you were hospitalized Your primary diagnosis was:  Not on File Follow-up Information Follow up With Details Comments Contact Linda Garnica [address] 200 [facility] 
[phone] Your Scheduled Appointments Wednesday June 06, 2018  1:00 PM EDT  
POST OP with Roni Day PA-C  
[address] [facility], [address] [facility] ([facility]) [facility] [address], [address] 200 [facility]  
[phone] Discharge Orders None A check ray indicates which time of day the medication should be taken. My Medications CONTINUE taking these medications Instructions Each Dose to Equal  
 Morning Noon Evening Bedtime  
 cholecalciferol (VITAMIN D3) 5,000 unit Tab tablet Commonly known as:  VITAMIN D3 Your last dose was: Your next dose is: Take  by mouth every Wednesday. CLARITIN 10 mg tablet Generic drug:  loratadine Your last dose was: Your next dose is: Take 10 mg by mouth daily as needed for Allergies. 10 mg HYDROcodone-acetaminophen 7.5-325 mg per tablet Commonly known as:  Javi Bettencourt Your last dose was: Your next dose is: TAKE ONE TO TWO TABLETS BY MOUTH Q 4 - 6 HRS PRN PAIN **AFTER SURGERY**DO NOT TAKE BEFORE SURGERY  Indications: Pain  
     
   
   
   
  
 multivitamin tablet Commonly known as:  ONE A DAY Your last dose was: Your next dose is: Take 1 Tab by mouth daily. 1 Tab NASACORT 55 mcg nasal inhaler Generic drug:  triamcinolone Your last dose was: Your next dose is: 2 Sprays daily as needed. 2 Spray  
    
   
   
   
  
 polyethylene glycol 17 gram packet Commonly known as:  Claudeen Cast Your last dose was: Your next dose is: Take 1 Packet by mouth daily. Mix in 8 oz prune juice 17 g  
    
   
   
   
  
 promethazine 25 mg tablet Commonly known as:  PHENERGAN Your last dose was: Your next dose is: Take 1 Tab by mouth every six (6) hours as needed for Nausea. 25 mg ASK your doctor about these medications Instructions Each Dose to Equal  
 Morning Noon Evening Bedtime  
 naproxen 500 mg tablet Commonly known as:  NAPROSYN Your last dose was: Your next dose is: Take 1 Tab by mouth two (2) times daily (with meals). 500 mg Opioid Education Prescription Opioids: What You Need to Know: 
 
Prescription opioids can be used to help relieve moderate-to-severe pain and are often prescribed following a surgery or injury, or for certain health conditions. These medications can be an important part of treatment but also come with serious risks. Opioids are strong pain medicines. Examples include hydrocodone, oxycodone, fentanyl, and morphine. Heroin is an example of an illegal opioid. It is important to work with your health care provider to make sure you are getting the safest, most effective care. WHAT ARE THE RISKS AND SIDE EFFECTS OF OPIOID USE? Prescription opioids carry serious risks of addiction and overdose, especially with prolonged use. An opioid overdose, often marked by slow breathing, can cause sudden death. The use of prescription opioids can have a number of side effects as well, even when taken as directed.  
  
· Tolerance-meaning you might need to take more of a medication for the same pain relief · Physical dependence-meaning you have symptoms of withdrawal when the medication is stopped. Withdrawal symptoms can include nausea, sweating, chills, diarrhea, stomach cramps, and muscle aches. Withdrawal can last up to several weeks, depending on which drug you took and how long you took it. · Increased sensitivity to pain · Constipation · Nausea, vomiting, and dry mouth · Sleepiness and dizziness · Confusion · Depression · Low levels of testosterone that can result in lower sex drive, energy, and strength · Itching and sweating RISKS ARE GREATER WITH:      
· History of drug misuse, substance use disorder, or overdose · Mental health conditions (such as depression or anxiety) · Sleep apnea · Older age (72 years or older) · Pregnancy Avoid alcohol while taking prescription opioids. Also, unless specifically advised by your health care provider, medications to avoid include: · Benzodiazepines (such as Xanax or Valium) · Muscle relaxants (such as Soma or Flexeril) · Hypnotics (such as Ambien or Lunesta) · Other prescription opioids KNOW YOUR OPTIONS Talk to your health care provider about ways to manage your pain that don't involve prescription opioids. Some of these options may actually work better and have fewer risks and side effects. Options may include: 
· Pain relievers such as acetaminophen, ibuprofen, and naproxen · Some medications that are also used for depression or seizures · Physical therapy and exercise · Counseling to help patients learn how to cope better with triggers of pain and stress. · Application of heat or cold compress · Massage therapy · Relaxation techniques Be Informed Make sure you know the name of your medication, how much and how often to take it, and its potential risks & side effects. IF YOU ARE PRESCRIBED OPIOIDS FOR PAIN: 
· Never take opioids in greater amounts or more often than prescribed. Remember the goal is not to be pain-free but to manage your pain at a tolerable level. · Follow up with your primary care provider to: · Work together to create a plan on how to manage your pain. · Talk about ways to help manage your pain that don't involve prescription opioids. · Talk about any and all concerns and side effects. · Help prevent misuse and abuse. · Never sell or share prescription opioids · Help prevent misuse and abuse. · Store prescription opioids in a secure place and out of reach of others (this may include visitors, children, friends, and family). · Safely dispose of unused/unwanted prescription opioids: Find your community drug take-back program or your pharmacy mail-back program, or flush them down the toilet, following guidance from the Food and Drug Administration (www.fda.gov/Drugs/ResourcesForYou). · Visit www.cdc.gov/drugoverdose to learn about the risks of opioid abuse and overdose. · If you believe you may be struggling with addiction, tell your health care provider and ask for guidance or call [address] [place], [place] [address] at 8-799-817-FTPN. Discharge Instructions 1)  Follow up with Dr. Bjorn Cha in 5 days. 2)  NWB to the Right lower extremity 3)  Elevate the Right lower extremity on 1 pillow. Place the pillow horizontal so that no pressure is on the back of your heel. 4)  Leave your current dressings and in place. 5)   Call [phone] to confirm your appointment. 6)   Keep your dressings clean and dry to the: Right lower extremity 7)  Start taking your pain medications when you get home 8 ) Follow up with Primary Care Provider in 7 to 10 days. 9)  Please call [phone] ([address]) if any: fever, shakes, chills, intractable pain, or for any questions you have regarding your care/medical condition. 10)  If you experience any calf pain or swelling, or are having any shortness of breath, chest pain, or extremity swelling, or bleeding thru any surgical dressings, or Bleeding at any body location while you are taking on any blood thinners. ie (mouth,nose, skin sites:) Please go to closest ER  ASAP for assessment to rule out a leg clot and to assess any bleeding.   
11) Start Aspirin 325 mg on date 6/1/2018 . 12) Pain Rx have been written for you Joel Bell. There are no outpatient Patient Instructions on file for this admission. DISCHARGE SUMMARY from Nurse PATIENT INSTRUCTIONS: 
 
 
F-face looks uneven A-arms unable to move or move unevenly S-speech slurred or non-existent T-time-call 911 as soon as signs and symptoms begin-DO NOT go Back to bed or wait to see if you get better-TIME IS BRAIN. Warning Signs of HEART ATTACK Call 911 if you have these symptoms: 
? Chest discomfort. Most heart attacks involve discomfort in the center of the chest that lasts more than a few minutes, or that goes away and comes back. It can feel like uncomfortable pressure, squeezing, fullness, or pain. ? Discomfort in other areas of the upper body. Symptoms can include pain or discomfort in one or both arms, the back, neck, jaw, or stomach. ? Shortness of breath with or without chest discomfort. ? Other signs may include breaking out in a cold sweat, nausea, or lightheadedness. Don't wait more than five minutes to call [address]! Fast action can save your life. Calling 911 is almost always the fastest way to get lifesaving treatment.  Emergency Medical Services staff can begin treatment when they arrive  up to an hour sooner than if someone gets to the hospital by car. The discharge information has been reviewed with the patient and mother. The patient and mother verbalized understanding. Discharge medications reviewed with the patient and mother and appropriate educational materials and side effects teaching were provided. ___________________________________________________________________________________________________________________________________ Introducing [facility]! [facility] introduces [employer]otypes patient portal. Now you can access parts of your medical record, email your doctor's office, and request medication refills online. 1. In your internet browser, go to [email or web address] 2. Click on the First Time User? Click Here link in the Sign In box. You will see the New Member Sign Up page. 3. Enter your Stereotypes Access Code exactly as it appears below. You will not need to use this code after youve completed the sign-up process. If you do not sign up before the expiration date, you must request a new code. · Stereotypes Access Code: [email or web address] Expires: 7/25/2018  5:47 PM 
 
4. Enter the last four digits of your Social Security Number (xxxx) and Date of Birth (mm/dd/yyyy) as indicated and click Submit. You will be taken to the next sign-up page. 5. Create a Wellpeppert ID. This will be your Stereotypes login ID and cannot be changed, so think of one that is secure and easy to remember. 6. Create a Well[employer] password. You can change your password at any time. 7. Enter your Password Reset Question and Answer. This can be used at a later time if you forget your password. 8. Enter your e-mail address. You will receive e-mail notification when new information is available in [address] 9. Click Sign Up. You can now view and download portions of your medical record. 10. Click the Download Summary menu link to download a portable copy of your medical information. If you have questions, please visit the Frequently Asked Questions section of the Instagramhart website. Remember, [employer] is NOT to be used for urgent needs. For medical emergencies, dial 911. Now available from your iPhone and Android! Introducing Mauricio Elaine As a [employer] patient, I wanted to make you aware of our electronic visit tool called Mauricio Elaine. [employer] 24/7 allows you to connect within minutes with a medical provider 24 hours a day, seven days a week via a mobile device or tablet or logging into a secure website from your computer. You can access Mauricio Elaine from anywhere in the [place]. A virtual visit might be right for you when you have a simple condition and feel like you just dont want to get out of bed, or cant get away from work for an appointment, when your regular [employer] provider is not available (evenings, weekends or holidays), or when youre out of town and need minor care. Electronic visits cost only $49 and if the [employer] 24/7 provider determines a prescription is needed to treat your condition, one can be electronically transmitted to a nearby pharmacy*. Please take a moment to enroll today if you have not already done so. The enrollment process is free and takes just a few minutes. To enroll, please download the [employer] 24/7 melody to your tablet or phone, or visit [email or web address] to enroll on your computer. And, as an [address] [place] [address] patient with a [employer] account, the results of your visits will be scanned into your electronic medical record and your primary care provider will be able to view the scanned results. We urge you to continue to see your regular [employer] provider for your ongoing medical care.   And while your primary care provider may not be the one available when you seek a Mauricio Elaine virtual visit, the peace of mind you get from getting a real diagnosis real time can be priceless. For more information on Mauricio Mayofaithfin, view our Frequently Asked Questions (FAQs) at [email or web address]. Sincerely, 
 
Luke Mckeon MD 
Chief Medical Officer Mateus Momin *:  certain medications cannot be prescribed via Mauricio Elaine Providers Seen During Your Hospitalization Provider Specialty Primary office phone Tama Zoila, Celia7 [facility] Orthopedic Surgery [phone] Your Primary Care Physician (PCP) Primary Care Physician Office Phone Office Fax Barb Masterson [phone] You are allergic to the following Allergen Reactions Pcn (Penicillins) Hives Swelling Other (comments) Throat closes Shellfish Derived Hives Swelling Other (comments) Throat closes Recent Documentation Height Weight BMI OB Status Smoking Status 1.753 m 106.1 kg 34.56 kg/m2 IUD Never Smoker Emergency Contacts Name Discharge Info Relation Home Work Mobile [address] [facility] CAREGIVER [3] Parent [1] [phone] Patient Belongings The following personal items are in your possession at time of discharge: 
  Dental Appliances: None  Visual Aid: Glasses      Home Medications: None   Jewelry: None  Clothing: Dress, Undergarments, Socks, Footwear    Other Valuables: Crutches, Brace Please provide this summary of care documentation to your next provider. Signatures-by signing, you are acknowledging that this After Visit Summary has been reviewed with you and you have received a copy. Patient Signature:  ____________________________________________________________ Date:  ____________________________________________________________  
  
Dar Swanson  Provider Signature: ____________________________________________________________ Date:  ____________________________________________________________

## 2018-06-01 NOTE — ANESTHESIA PREPROCEDURE EVALUATION
Anesthetic History               Review of Systems / Medical History  Patient summary reviewed and pertinent labs reviewed    Pulmonary                   Neuro/Psych         Headaches     Cardiovascular                       GI/Hepatic/Renal     GERD      Liver disease     Endo/Other        Anemia     Other Findings   Comments: Documentation of current medication  Current medications obtained, documented and obtained? YES      Risk Factors for Postoperative nausea/vomiting:       History of postoperative nausea/vomiting? NO       Female? YES       Motion sickness? NO       Intended opioid administration for postoperative analgesia? YES      Smoking Abstinence:  Current Smoker? NO  Elective Surgery? YES  Seen preoperatively by anesthesiologist or proxy prior to day of surgery? YES  Pt abstained from smoking 24 hours prior to anesthesia?  N/A    Preventive care/screening for High Blood Pressure:  Aged 18 years and older: YES  Screened for high blood pressure: YES  Patients with high blood pressure referred to primary care provider   for BP management: YES                 Physical Exam    Airway  Mallampati: II  TM Distance: 4 - 6 cm  Neck ROM: normal range of motion   Mouth opening: Normal     Cardiovascular    Rhythm: regular  Rate: normal         Dental  No notable dental hx       Pulmonary  Breath sounds clear to auscultation               Abdominal  GI exam deferred       Other Findings            Anesthetic Plan    ASA: 2  Anesthesia type: general      Post-op pain plan if not by surgeon: peripheral nerve block single    Induction: Intravenous  Anesthetic plan and risks discussed with: Patient

## 2018-06-01 NOTE — DISCHARGE INSTRUCTIONS
1)  Follow up with Dr. Bjorn Hernandez in 5 days. 2)  NWB to the Right lower extremity  3)  Elevate the Right lower extremity on 1 pillow. Place the pillow horizontal so that no pressure is on the back of your heel. 4)  Leave your current dressings and in place. 5)   Call [phone] to confirm your appointment. 6)   Keep your dressings clean and dry to the: Right lower extremity  7)  Start taking your pain medications when you get home  8 ) Follow up with Primary Care Provider in 7 to 10 days. 9)  Please call [phone] ([address]) if any: fever, shakes, chills, intractable pain, or for any questions you have regarding your care/medical condition. 10)  If you experience any calf pain or swelling, or are having any shortness of breath, chest pain, or extremity swelling, or bleeding thru any surgical dressings, or Bleeding at any body location while you are taking on any blood thinners. ie (mouth,nose, skin sites:)  Please go to closest ER  ASAP for assessment to rule out a leg clot and to assess any bleeding.    11) Start Aspirin 325 mg on date 6/1/2018 .  12) Pain Rx have been written for you Sergo Hilton. There are no outpatient Patient Instructions on file for this admission. DISCHARGE SUMMARY from Nurse    PATIENT INSTRUCTIONS:    After general anesthesia or intravenous sedation, for 24 hours or while taking prescription Narcotics:  · Limit your activities  · Do not drive and operate hazardous machinery  · Do not make important personal or business decisions  · Do  not drink alcoholic beverages  · If you have not urinated within 8 hours after discharge, please contact your surgeon on call.     Report the following to your surgeon:  · Excessive pain, swelling, redness or odor of or around the surgical area  · Temperature over 100.5  · Nausea and vomiting lasting longer than 4 hours or if unable to take medications  · Any signs of decreased circulation or nerve impairment to extremity: change in color, persistent  numbness, tingling, coldness or increase pain  · Any questions    What to do at Home:  Recommended activity: Activity as tolerated and no driving for today, or while taking narcotic pain medication. Dr. Trudi Gurrola will inform you at follow up appointment when you can resume driving. *  Please give a list of your current medications to your Primary Care Provider. *  Please update this list whenever your medications are discontinued, doses are      changed, or new medications (including over-the-counter products) are added. *  Please carry medication information at all times in case of emergency situations. These are general instructions for a healthy lifestyle:    No smoking/ No tobacco products/ Avoid exposure to second hand smoke  Surgeon General's Warning:  Quitting smoking now greatly reduces serious risk to your health. Obesity, smoking, and sedentary lifestyle greatly increases your risk for illness    A healthy diet, regular physical exercise & weight monitoring are important for maintaining a healthy lifestyle    You may be retaining fluid if you have a history of heart failure or if you experience any of the following symptoms:  Weight gain of 3 pounds or more overnight or 5 pounds in a week, increased swelling in our hands or feet or shortness of breath while lying flat in bed. Please call your doctor as soon as you notice any of these symptoms; do not wait until your next office visit. Recognize signs and symptoms of STROKE:    F-face looks uneven    A-arms unable to move or move unevenly    S-speech slurred or non-existent    T-time-call 911 as soon as signs and symptoms begin-DO NOT go       Back to bed or wait to see if you get better-TIME IS BRAIN. Warning Signs of HEART ATTACK     Call 911 if you have these symptoms:   Chest discomfort.  Most heart attacks involve discomfort in the center of the chest that lasts more than a few minutes, or that goes away and comes back. It can feel like uncomfortable pressure, squeezing, fullness, or pain.  Discomfort in other areas of the upper body. Symptoms can include pain or discomfort in one or both arms, the back, neck, jaw, or stomach.  Shortness of breath with or without chest discomfort.  Other signs may include breaking out in a cold sweat, nausea, or lightheadedness. Don't wait more than five minutes to call 911 - MINUTES MATTER! Fast action can save your life. Calling 911 is almost always the fastest way to get lifesaving treatment. Emergency Medical Services staff can begin treatment when they arrive -- up to an hour sooner than if someone gets to the hospital by car. The discharge information has been reviewed with the patient and mother. The patient and mother verbalized understanding. Discharge medications reviewed with the patient and mother and appropriate educational materials and side effects teaching were provided.   ___________________________________________________________________________________________________________________________________

## 2018-06-03 ENCOUNTER — TELEPHONE (OUTPATIENT)
Dept: ORTHOPEDIC SURGERY | Age: 38
End: 2018-06-03

## 2018-06-04 NOTE — TELEPHONE ENCOUNTER
Received call from patient via answering service. Patient is 2 days s/p peroneal tendon repair with complaint of chills and headache. Patient states that she  Is not warm and she does not think she has a fever. She has a history of migraines. She has not taken any pain medicine today other than and aspirin this morning. She will get a thermometer and take her temperature. Patient was instructed to go to the ED if she has a fever or her symptoms worsen. She will call the office in the morning to be seen in the office tomorrow.       Comfort Chavez PA-C  6/3/2018 10:23 PM

## 2018-06-06 ENCOUNTER — OFFICE VISIT (OUTPATIENT)
Dept: ORTHOPEDIC SURGERY | Age: 38
End: 2018-06-06

## 2018-06-06 VITALS
HEART RATE: 75 BPM | TEMPERATURE: 97.8 F | HEIGHT: 69 IN | DIASTOLIC BLOOD PRESSURE: 88 MMHG | OXYGEN SATURATION: 98 % | SYSTOLIC BLOOD PRESSURE: 121 MMHG | RESPIRATION RATE: 16 BRPM

## 2018-06-06 DIAGNOSIS — M76.71 TENDINITIS OF RIGHT PERONEUS BREVIS TENDON: ICD-10-CM

## 2018-06-06 DIAGNOSIS — Z98.890 POST-OPERATIVE STATE: Primary | ICD-10-CM

## 2018-06-06 RX ORDER — PROMETHAZINE HYDROCHLORIDE 25 MG/1
25 TABLET ORAL
Qty: 30 TAB | Refills: 0 | Status: SHIPPED | OUTPATIENT
Start: 2018-06-06 | End: 2019-07-17

## 2018-06-06 RX ORDER — HYDROCODONE BITARTRATE AND ACETAMINOPHEN 7.5; 325 MG/1; MG/1
TABLET ORAL
Qty: 50 TAB | Refills: 0 | Status: SHIPPED | OUTPATIENT
Start: 2018-06-06

## 2018-06-06 NOTE — PATIENT INSTRUCTIONS
· Dressing: changed in the office today. Patient placed in posterior splint    · Keep the current dressings on and in place. You need to keep this incision clean and dry. If you have a splint or cast on please keep this clean and dry as well. · You should expect swelling and bruising in the area over the next several days. You may elevate the right extremity on 1 pillow. Place the pillow horizontal so that no pressure is on the back of your heel. You may apply an icepack on top of the dressing to help minimize the swelling. · Keep all pets away from  any wound present in order to prevent infection. · Continue Activity modification    · Weight bearing status:  non weight bearing to the right lower extremity    · No lifting, twisting, squatting, deep bending, driving or strenuous activity. PLEASE SEEK IMMEDIATE ASSESSMENT BY ER PHYSICIAN IF ANY OF THE FOLLOWING EXIST:     Excessive pain, swelling, redness or odor of or around the surgical area   Temperature over 100.5   Nausea and vomiting lasting longer than 4 hours or if unable to take medications   Any signs of decreased circulation or nerve impairment to extremity: change in color, persistent numbness, tingling, coldness or increase pain   If any calf pain, calf tightness, shortness of breath, chest pain   Any difficulty breathing at rest or with ambulation, any chest tightness/soreness  Severe intractable pain, persistent swelling or drainage, development of a wound, incisional redness, finger/toe swelling or color changes, or CALF PAIN    · Perform ankle pumps with non-surgical/non-injured extremity to help reduce the risk of blood clots    · Please follow up in 2 weeks     · Continue taking Asprin as directed      · Prescription for Norco 7.5/325 has been provided. Please take medication as directed         Acute Pain After Surgery: Care Instructions  Your Care Instructions    It's common to have some pain after surgery.  Pain doesn't mean that something is wrong or that the surgery didn't go well. But when the pain is severe, it's important to work with your doctor to manage it. It's also important to be aware of a few facts about pain and pain medicine. · You are the only person who knows what your pain feels like. So be sure to tell your doctor when you are in pain or when the pain changes. Then he or she will know how to adjust your medicines. · Pain is often easier to control right after it starts. So it may be better to take regular doses of pain medicine and not wait until the pain gets bad. · Medicine can help control pain. But this doesn't mean you'll have no pain. Medicine works to keep the pain at a level you can live with. With time, you will feel better. Follow-up care is a key part of your treatment and safety. Be sure to make and go to all appointments, and call your doctor if you are having problems. It's also a good idea to know your test results and keep a list of the medicines you take. How can you care for yourself at home? · Be safe with medicines. Read and follow all instructions on the label. ¨ If the doctor gave you a prescription medicine for pain, take it as prescribed. ¨ If you are not taking a prescription pain medicine, ask your doctor if you can take an over-the-counter medicine. · If you take an over-the-counter pain medicine, such as acetaminophen (Tylenol), ibuprofen (Advil, Motrin), or naproxen (Aleve), read and follow all instructions on the label. · Do not take two or more pain medicines at the same time unless the doctor told you to. · Do not drink alcohol while you are taking pain medicines. · Try to walk each day if your doctor recommends it. Start by walking a little more than you did the day before. Bit by bit, increase the amount you walk. Walking increases blood flow. It also helps prevent pneumonia and constipation.   · To prevent constipation from opioid pain medicines:  ¨ Talk to your doctor about a laxative. ¨ Include fruits, vegetables, beans, and whole grains in your diet each day. These foods are high in fiber. ¨ Drink plenty of fluids, enough so that your urine is light yellow or clear like water. Drink water, fruit juice, or other drinks that do not contain caffeine or alcohol. If you have kidney, heart, or liver disease and have to limit fluids, talk with your doctor before you increase the amount of fluids you drink. ¨ Take a fiber supplement, such as Citrucel or Metamucil, every day if needed. Read and follow all instructions on the label. If you take pain medicine for more than a few days, talk to your doctor before you take fiber. When should you call for help? Call your doctor now or seek immediate medical care if:  ? · Your pain gets worse. ? · Your pain is not controlled by medicine. ? Watch closely for changes in your health, and be sure to contact your doctor if you have any problems. Where can you learn more? Go to http://petar-regis.info/. Enter [phone] in the search box to learn more about \"Acute Pain After Surgery: Care Instructions. \"  Current as of: March 20, 2017  Content Version: 11.4  © 4384-7630 [employer]. Care instructions adapted under license by [employer] (which disclaims liability or warranty for this information). If you have questions about a medical condition or this instruction, always ask your healthcare professional. [place] [address] any warranty or liability for your use of this information.

## 2018-06-06 NOTE — PROGRESS NOTES
Patient: Avril Sanchez                MRN: [id number]       SSN: [id number]  YOB: 1980           AGE: 40 y.o. SEX: female    Mak Ledesma MD    POST OP OFFICE NOTE  DOS: 6/1/18    Chief Complaint:   Chief Complaint   Patient presents with    Foot Pain     Right foot pain       HPI:     The patient is a 40 y.o. female who presents today for follow up 5 days s/p:     1. RIGHT PERONEAL BREVIS AND LONGUS TENDON TENOSYNOVECTOMIES   2. PRIMARY REPAIR OF PERONEAL BREVIS TENDON  3. EXCISION OF BUTTON HOLE CENTRAL PERONEAL TENDON TEAR      Patient has been NWB to the right lower extremity. Patient reports doing well other than chills and HA that resolved. Patient denies any fever, chest pain, shortness of breath or calf pain. Patient states that he nerve block lasted until Sunday. There are no new illness or injuries to report since last seen in the office. Patient is on Aspirin for DVT prophylaxis. Patient can expect to be out of work for at least the next 6 weeks. She may possibly return to sedentary duty sooner depending on her recovery. PHYSICAL EXAM:     Visit Vitals    /88 (BP 1 Location: Right arm, BP Patient Position: Sitting)    Pulse 75    Temp 97.8 °F (36.6 °C) (Oral)    Resp 16    Ht 5' 9\" (1.753 m)    SpO2 98%       GEN:  Alert, well developed, well nourished, well appearing 40 y.o. female in no acute distress. PSYCH:  Normal affect, mood, and conduct. alert, oriented x 3 alert, oriented x 3, no dementia  M/S EXAMINATION OF: right foot/ankle  DRESSINGS: CDI - mild dry soilage  DRAINAGE: none  INCISION: Incision looks good, skin well approximated, no dehiscence, skin staples in place without disruption. SKIN: mild edema , no erythema, no  ecchymosis, no warmth    TENDERNESS:  mild tenderness to palpation (as expected after surgery)  NEUROVASCULAR:  grossly intact. Positive distal pulses and capillary refill.    DVT ASSESSMENT:  The calf is not tender to palpation. No evidence of DVT seen on physical exam.  ROM: not tested       RADIOGRAPHS & DIAGNOSTIC STUDIES     No results found for any visits on 06/06/18. IMPRESSION:     Encounter Diagnoses     ICD-10-CM ICD-9-CM   1. Post-operative state Z98.890 V45.89   2. Tendinitis of right peroneus brevis tendon M76.71 727.06       PLAN:         Orders Placed This Encounter    HYDROcodone-acetaminophen (NORCO) 7.5-325 mg per tablet    promethazine (PHENERGAN) 25 mg tablet                  · Dressing: changed in the office today. Patient placed in posterior splint    · Keep the current dressings on and in place. You need to keep this incision clean and dry. If you have a splint or cast on please keep this clean and dry as well. · You should expect swelling and bruising in the area over the next several days. You may elevate the right extremity on 1 pillow. Place the pillow horizontal so that no pressure is on the back of your heel. You may apply an icepack on top of the dressing to help minimize the swelling. · Keep all pets away from  any wound present in order to prevent infection. · Continue Activity modification    · Weight bearing status:  non weight bearing to the right lower extremity    · No lifting, twisting, squatting, deep bending, driving or strenuous activity.     PLEASE SEEK IMMEDIATE ASSESSMENT BY ER PHYSICIAN IF ANY OF THE FOLLOWING EXIST:     Excessive pain, swelling, redness or odor of or around the surgical area   Temperature over 100.5   Nausea and vomiting lasting longer than 4 hours or if unable to take medications   Any signs of decreased circulation or nerve impairment to extremity: change in color, persistent numbness, tingling, coldness or increase pain   If any calf pain, calf tightness, shortness of breath, chest pain   Any difficulty breathing at rest or with ambulation, any chest tightness/soreness  Severe intractable pain, persistent swelling or drainage, development of a wound, incisional redness, finger/toe swelling or color changes, or CALF PAIN    · Perform ankle pumps with non-surgical/non-injured extremity to help reduce the risk of blood clots    · Please follow up in 2 weeks for staple removal    · Continue taking Asprin as directed      · Prescription for Norco 7.5/325 has been provided. Please take medication as directed    · Patient can expect to be out of work for at least 6 weeks            Patient has been discussed with Dr. Bridget Lima during this visit and he agrees with the assessment and plan    Patient expresses understanding of the plan. Patient education provided on post surgical care. REVIEW OF SYSTEMS:     Otherwise as noted in HPI      PAST MEDICAL HISTORY:     Past Medical History:   Diagnosis Date    Anemia     Chronic pain     knees and ankles    GERD (gastroesophageal reflux disease)     during pregancy    Headache     Hemorrhoid     History of body piercing     Hypercholesterolemia     IUD (intrauterine device) in place     Migraine headache     SIFUENTES (nonalcoholic steatohepatitis) (per outside records) 6/11/2017    denies liver disease 5/22/2018    Tendinitis        MEDICATIONS:     Current Outpatient Prescriptions   Medication Sig    HYDROcodone-acetaminophen (NORCO) 7.5-325 mg per tablet TAKE ONE TO TWO TABLETS BY MOUTH Q 4 - 6 HRS PRN PAIN **AFTER SURGERY**  Indications: Pain    promethazine (PHENERGAN) 25 mg tablet Take 1 Tab by mouth every six (6) hours as needed for Nausea.  polyethylene glycol (MIRALAX) 17 gram packet Take 1 Packet by mouth daily. Mix in 8 oz prune juice    cholecalciferol, VITAMIN D3, (VITAMIN D3) 5,000 unit tab tablet Take  by mouth every Wednesday.  loratadine (CLARITIN) 10 mg tablet Take 10 mg by mouth daily as needed for Allergies.  triamcinolone (NASACORT) 55 mcg nasal inhaler 2 Sprays daily as needed.  multivitamin (ONE A DAY) tablet Take 1 Tab by mouth daily.     naproxen (NAPROSYN) 500 mg tablet Take 1 Tab by mouth two (2) times daily (with meals). (Patient taking differently: Take 500 mg by mouth two (2) times daily as needed.)     No current facility-administered medications for this visit. ALLERGIES:     Allergies   Allergen Reactions    Pcn [Penicillins] Hives, Swelling and Other (comments)     Throat closes    Shellfish Derived Hives, Swelling and Other (comments)     Throat closes         PAST SURGICAL HISTORY:     Past Surgical History:   Procedure Laterality Date    HX OTHER SURGICAL      Tooth extraction (molars)       SOCIAL HISTORY:     Social History     Social History    Marital status: UNKNOWN     Spouse name: N/A    Number of children: N/A    Years of education: N/A     Occupational History    Not on file.      Social History Main Topics    Smoking status: Never Smoker    Smokeless tobacco: Never Used    Alcohol use Yes      Comment: rare    Drug use: No    Sexual activity: Not on file     Other Topics Concern    Not on file     Social History Narrative       FAMILY HISTORY:     Family History   Problem Relation Age of Onset    Diabetes Mother     Hypertension Mother     Bipolar Disorder Mother     Elevated Lipids Father     COPD Maternal Grandmother     Emphysema Maternal Grandmother     Lung Disease Maternal Grandmother     No Known Problems Maternal Grandfather     Stroke Paternal Grandmother     No Known Problems Paternal Grandfather     Allergic Rhinitis Daughter     Eczema Daughter     Headache Daughter            Yu Lacy, [place]  6/6/2018

## 2018-06-08 DIAGNOSIS — M65.871 OTHER SYNOVITIS AND TENOSYNOVITIS, RIGHT ANKLE AND FOOT: Primary | ICD-10-CM

## 2018-06-20 ENCOUNTER — OFFICE VISIT (OUTPATIENT)
Dept: ORTHOPEDIC SURGERY | Age: 38
End: 2018-06-20

## 2018-06-20 VITALS
TEMPERATURE: 96.6 F | HEART RATE: 97 BPM | SYSTOLIC BLOOD PRESSURE: 123 MMHG | HEIGHT: 69 IN | RESPIRATION RATE: 16 BRPM | OXYGEN SATURATION: 99 % | DIASTOLIC BLOOD PRESSURE: 82 MMHG

## 2018-06-20 DIAGNOSIS — Z98.890 POST-OPERATIVE STATE: ICD-10-CM

## 2018-06-20 DIAGNOSIS — M76.71 TENDINITIS OF RIGHT PERONEUS BREVIS TENDON: Primary | ICD-10-CM

## 2018-06-20 NOTE — PATIENT INSTRUCTIONS
· Dressing: changed in the office today. Patient placed in posterior splint    · Keep the current dressings on and in place. You need to keep this incision clean and dry. If you have a splint or cast on please keep this clean and dry as well. · You should expect swelling and bruising in the area over the next several days. You may elevate the right extremity on 1 pillow. Place the pillow horizontal so that no pressure is on the back of your heel. You may apply an icepack on top of the dressing to help minimize the swelling. · Keep all pets away from  any wound present in order to prevent infection. · Continue Activity modification    · Weight bearing status:  non weight bearing to the right lower extremity    · No lifting, twisting, squatting, deep bending, driving or strenuous activity. PLEASE SEEK IMMEDIATE ASSESSMENT BY ER PHYSICIAN IF ANY OF THE FOLLOWING EXIST:     Excessive pain, swelling, redness or odor of or around the surgical area   Temperature over 100.5   Nausea and vomiting lasting longer than 4 hours or if unable to take medications   Any signs of decreased circulation or nerve impairment to extremity: change in color, persistent numbness, tingling, coldness or increase pain   If any calf pain, calf tightness, shortness of breath, chest pain   Any difficulty breathing at rest or with ambulation, any chest tightness/soreness  Severe intractable pain, persistent swelling or drainage, development of a wound, incisional redness, finger/toe swelling or color changes, or CALF PAIN    · Perform ankle pumps with non-surgical/non-injured extremity to help reduce the risk of blood clots    · Please follow up in 1 week    · Continue taking Asprin as directed             Acute Pain After Surgery: Care Instructions  Your Care Instructions    It's common to have some pain after surgery. Pain doesn't mean that something is wrong or that the surgery didn't go well.  But when the pain is severe, it's important to work with your doctor to manage it. It's also important to be aware of a few facts about pain and pain medicine. · You are the only person who knows what your pain feels like. So be sure to tell your doctor when you are in pain or when the pain changes. Then he or she will know how to adjust your medicines. · Pain is often easier to control right after it starts. So it may be better to take regular doses of pain medicine and not wait until the pain gets bad. · Medicine can help control pain. But this doesn't mean you'll have no pain. Medicine works to keep the pain at a level you can live with. With time, you will feel better. Follow-up care is a key part of your treatment and safety. Be sure to make and go to all appointments, and call your doctor if you are having problems. It's also a good idea to know your test results and keep a list of the medicines you take. How can you care for yourself at home? · Be safe with medicines. Read and follow all instructions on the label. ¨ If the doctor gave you a prescription medicine for pain, take it as prescribed. ¨ If you are not taking a prescription pain medicine, ask your doctor if you can take an over-the-counter medicine. · If you take an over-the-counter pain medicine, such as acetaminophen (Tylenol), ibuprofen (Advil, Motrin), or naproxen (Aleve), read and follow all instructions on the label. · Do not take two or more pain medicines at the same time unless the doctor told you to. · Do not drink alcohol while you are taking pain medicines. · Try to walk each day if your doctor recommends it. Start by walking a little more than you did the day before. Bit by bit, increase the amount you walk. Walking increases blood flow. It also helps prevent pneumonia and constipation. · To prevent constipation from opioid pain medicines:  ¨ Talk to your doctor about a laxative.   ¨ Include fruits, vegetables, beans, and whole grains in your diet each day. These foods are high in fiber. ¨ Drink plenty of fluids, enough so that your urine is light yellow or clear like water. Drink water, fruit juice, or other drinks that do not contain caffeine or alcohol. If you have kidney, heart, or liver disease and have to limit fluids, talk with your doctor before you increase the amount of fluids you drink. ¨ Take a fiber supplement, such as Citrucel or Metamucil, every day if needed. Read and follow all instructions on the label. If you take pain medicine for more than a few days, talk to your doctor before you take fiber. When should you call for help? Call your doctor now or seek immediate medical care if:  ? · Your pain gets worse. ? · Your pain is not controlled by medicine. ? Watch closely for changes in your health, and be sure to contact your doctor if you have any problems. Where can you learn more? Go to http://petar-regis.info/. Enter ([phone] [phone] in the search box to learn more about \"Acute Pain After Surgery: Care Instructions. \"  Current as of: March 20, 2017  Content Version: 11.4  © 3959-5192 [employer]. Care instructions adapted under license by [employer] (which disclaims liability or warranty for this information). If you have questions about a medical condition or this instruction, always ask your healthcare professional. [address] any warranty or liability for your use of this information.

## 2018-06-20 NOTE — MR AVS SNAPSHOT
[address] [place] [address], [address] 200 [facility] 
[phone] Patient: Hector July MRN: [id number] WOD:5/79/2792 Visit Information Date & Time Provider Department Dept. Phone Encounter #  
 6/20/2018  9:45  Bobby Merrill, [address] [facility] [phone] Follow-up Instructions Return in about 1 week (around 6/27/2018) for follow up evaluation, staple removal. Upcoming Health Maintenance Date Due DTaP/Tdap/Td series (1 - Tdap) 8/28/2001 PAP AKA CERVICAL CYTOLOGY 8/28/2001 Influenza Age 5 to Adult 8/1/2018 Allergies as of 6/20/2018  Review Complete On: 6/20/2018 By: Iwona Momin PA-C Severity Noted Reaction Type Reactions Pcn [Penicillins] High 05/12/2017    Hives, Swelling, Other (comments) Throat closes Shellfish Derived High 05/12/2017    Hives, Swelling, Other (comments) Throat closes Current Immunizations  Never Reviewed No immunizations on file. Not reviewed this visit You Were Diagnosed With   
  
 Codes Comments Tendinitis of right peroneus brevis tendon    -  Primary ICD-10-CM: M76.71 
ICD-9-CM: 727.06 Post-operative state     ICD-10-CM: G50.793 ICD-9-CM: V45.89 Vitals BP Pulse Temp Resp Height(growth percentile) SpO2  
 123/82 (BP 1 Location: Right arm, BP Patient Position: Sitting) 97 96.6 °F (35.9 °C) (Oral) 16 5' 9\" (1.753 m) 99% OB Status Smoking Status IUD Never Smoker Preferred Pharmacy Pharmacy Name Phone [facility], [address] [place] [address] [phone] Your Updated Medication List  
  
   
This list is accurate as of 6/20/18 10:37 AM.  Always use your most recent med list.  
  
  
  
  
 cholecalciferol (VITAMIN D3) 5,000 unit Tab tablet Commonly known as:  VITAMIN D3 Take  by mouth every Wednesday. CLARITIN 10 mg tablet Generic drug:  loratadine Take 10 mg by mouth daily as needed for Allergies. HYDROcodone-acetaminophen 7.5-325 mg per tablet Commonly known as:  NORCO  
TAKE ONE TO TWO TABLETS BY MOUTH Q 4 - 6 HRS PRN PAIN **AFTER SURGERY**  Indications: Pain  
  
 multivitamin tablet Commonly known as:  ONE A DAY Take 1 Tab by mouth daily. naproxen 500 mg tablet Commonly known as:  NAPROSYN Take 1 Tab by mouth two (2) times daily (with meals). NASACORT 55 mcg nasal inhaler Generic drug:  triamcinolone 2 Sprays daily as needed. polyethylene glycol 17 gram packet Commonly known as:  Claudeen Cast Take 1 Packet by mouth daily. Mix in 8 oz prune juice  
  
 promethazine 25 mg tablet Commonly known as:  PHENERGAN Take 1 Tab by mouth every six (6) hours as needed for Nausea. Follow-up Instructions Return in about 1 week (around 6/27/2018) for follow up evaluation, staple removal.  
  
  
Patient Instructions · Dressing: changed in the office today. Patient placed in posterior splint · Keep the current dressings on and in place. You need to keep this incision clean and dry. If you have a splint or cast on please keep this clean and dry as well. · You should expect swelling and bruising in the area over the next several days. You may elevate the right extremity on 1 pillow. Place the pillow horizontal so that no pressure is on the back of your heel. You may apply an icepack on top of the dressing to help minimize the swelling. · Keep all pets away from  any wound present in order to prevent infection. · Continue Activity modification · Weight bearing status:  non weight bearing to the right lower extremity · No lifting, twisting, squatting, deep bending, driving or strenuous activity. PLEASE SEEK IMMEDIATE ASSESSMENT BY ER PHYSICIAN IF ANY OF THE FOLLOWING EXIST:  
 
Excessive pain, swelling, redness or odor of or around the surgical area Temperature over 100.5 Nausea and vomiting lasting longer than 4 hours or if unable to take medications Any signs of decreased circulation or nerve impairment to extremity: change in color, persistent numbness, tingling, coldness or increase pain If any calf pain, calf tightness, shortness of breath, chest pain Any difficulty breathing at rest or with ambulation, any chest tightness/soreness Severe intractable pain, persistent swelling or drainage, development of a wound, incisional redness, finger/toe swelling or color changes, or CALF PAIN 
 
· Perform ankle pumps with non-surgical/non-injured extremity to help reduce the risk of blood clots · Please follow up in 1 week · Continue taking Asprin as directed Acute Pain After Surgery: Care Instructions Your Care Instructions It's common to have some pain after surgery. Pain doesn't mean that something is wrong or that the surgery didn't go well. But when the pain is severe, it's important to work with your doctor to manage it. It's also important to be aware of a few facts about pain and pain medicine. · You are the only person who knows what your pain feels like. So be sure to tell your doctor when you are in pain or when the pain changes. Then he or she will know how to adjust your medicines. · Pain is often easier to control right after it starts. So it may be better to take regular doses of pain medicine and not wait until the pain gets bad. · Medicine can help control pain. But this doesn't mean you'll have no pain. Medicine works to keep the pain at a level you can live with. With time, you will feel better. Follow-up care is a key part of your treatment and safety. Be sure to make and go to all appointments, and call your doctor if you are having problems. It's also a good idea to know your test results and keep a list of the medicines you take. How can you care for yourself at home? · Be safe with medicines. Read and follow all instructions on the label. ¨ If the doctor gave you a prescription medicine for pain, take it as prescribed. ¨ If you are not taking a prescription pain medicine, ask your doctor if you can take an over-the-counter medicine. · If you take an over-the-counter pain medicine, such as acetaminophen (Tylenol), ibuprofen (Advil, Motrin), or naproxen (Aleve), read and follow all instructions on the label. · Do not take two or more pain medicines at the same time unless the doctor told you to. · Do not drink alcohol while you are taking pain medicines. · Try to walk each day if your doctor recommends it. Start by walking a little more than you did the day before. Bit by bit, increase the amount you walk. Walking increases blood flow. It also helps prevent pneumonia and constipation. · To prevent constipation from opioid pain medicines: ¨ Talk to your doctor about a laxative. ¨ Include fruits, vegetables, beans, and whole grains in your diet each day. These foods are high in fiber. ¨ Drink plenty of fluids, enough so that your urine is light yellow or clear like water. Drink water, fruit juice, or other drinks that do not contain caffeine or alcohol. If you have kidney, heart, or liver disease and have to limit fluids, talk with your doctor before you increase the amount of fluids you drink. ¨ Take a fiber supplement, such as Citrucel or Metamucil, every day if needed. Read and follow all instructions on the label. If you take pain medicine for more than a few days, talk to your doctor before you take fiber. When should you call for help? Call your doctor now or seek immediate medical care if: 
? · Your pain gets worse. ? · Your pain is not controlled by medicine. ? Watch closely for changes in your health, and be sure to contact your doctor if you have any problems. Where can you learn more? Go to http://petar-regis.info/. Enter (97) 899-724 in the search box to learn more about \"Acute Pain After Surgery: Care Instructions. \" Current as of: March 20, 2017 Content Version: 11.4 © 9823-4892 [employer]. Care instructions adapted under license by [employer] (which disclaims liability or warranty for this information). If you have questions about a medical condition or this instruction, always ask your healthcare professional. [address] any warranty or liability for your use of this information. Introducing \[facility]\"" & HEALTH SERVICES! Dear Cristian Hutchinson: 
Thank you for requesting a Clever Sense account. Our records indicate that you already have an active Clever Sense account. You can access your account anytime at [email or web address] Did you know that you can access your hospital and ER discharge instructions at any time in Clever Sense? You can also review all of your test results from your hospital stay or ER visit. Additional Information If you have questions, please visit the Frequently Asked Questions section of the Clever Sense website at [email or web address] Remember, Clever Sense is NOT to be used for urgent needs. For medical emergencies, dial 911. Now available from your iPhone and Android! Please provide this summary of care documentation to your next provider. Your primary care clinician is listed as Beau Espinoza. If you have any questions after today's visit, please call [phone].

## 2018-06-20 NOTE — PROGRESS NOTES
Patient: Amaury Hickman                MRN: [id number]       SSN: [id number]  YOB: 1980           AGE: 40 y.o. SEX: female    Lonnie Galarza MD    POST OP OFFICE NOTE  DOS: 6/1/18    Chief Complaint:   Chief Complaint   Patient presents with    Follow-up     Right foot follow up       HPI:     The patient is a 40 y.o. female who presents today for follow up 19 days s/p:     1. RIGHT PERONEAL BREVIS AND LONGUS TENDON TENOSYNOVECTOMIES   2. PRIMARY REPAIR OF PERONEAL BREVIS TENDON  3. EXCISION OF BUTTON HOLE CENTRAL PERONEAL TENDON TEAR      Patient has been NWB to the right lower extremity. Patient reports doing well other than chills and HA that resolved. Patient denies any fever, chest pain, shortness of breath or calf pain. Patient states that he nerve block lasted until Sunday. There are no new illness or injuries to report since last seen in the office. Patient is on Aspirin for DVT prophylaxis. Patient can expect to be out of work for at least the next 6 weeks. She may possibly return to sedentary duty sooner depending on her recovery. PHYSICAL EXAM:     Visit Vitals    /82 (BP 1 Location: Right arm, BP Patient Position: Sitting)    Pulse 97    Temp 96.6 °F (35.9 °C) (Oral)    Resp 16    Ht 5' 9\" (1.753 m)    SpO2 99%       GEN:  Alert, well developed, well nourished, well appearing 40 y.o. female in no acute distress. PSYCH:  Normal affect, mood, and conduct. alert, oriented x 3 alert, oriented x 3, no dementia  M/S EXAMINATION OF: right foot/ankle  DRESSINGS: CDI - mild dry soilage  DRAINAGE: none  INCISION: Incision looks good, skin well approximated, no dehiscence, skin staples in place without disruption. SKIN: mild edema , no erythema, no  ecchymosis, no warmth    TENDERNESS:  mild tenderness to palpation (as expected after surgery)  NEUROVASCULAR:  grossly intact. Positive distal pulses and capillary refill.    DVT ASSESSMENT:  The calf is not tender to palpation. No evidence of DVT seen on physical exam.  ROM: not tested       RADIOGRAPHS & DIAGNOSTIC STUDIES     No results found for any visits on 06/20/18. IMPRESSION:     Encounter Diagnoses     ICD-10-CM ICD-9-CM   1. Tendinitis of right peroneus brevis tendon M76.71 727.06   2. Post-operative state Z98.890 V45.89       PLAN:         No orders of the defined types were placed in this encounter.                 · Dressing: changed in the office today. Patient placed in posterior splint    · Keep the current dressings on and in place. You need to keep this incision clean and dry. If you have a splint or cast on please keep this clean and dry as well. · You should expect swelling and bruising in the area over the next several days. You may elevate the right extremity on 1 pillow. Place the pillow horizontal so that no pressure is on the back of your heel. You may apply an icepack on top of the dressing to help minimize the swelling. · Keep all pets away from  any wound present in order to prevent infection. · Continue Activity modification    · Weight bearing status:  non weight bearing to the right lower extremity    · No lifting, twisting, squatting, deep bending, driving or strenuous activity.     PLEASE SEEK IMMEDIATE ASSESSMENT BY ER PHYSICIAN IF ANY OF THE FOLLOWING EXIST:     Excessive pain, swelling, redness or odor of or around the surgical area   Temperature over 100.5   Nausea and vomiting lasting longer than 4 hours or if unable to take medications   Any signs of decreased circulation or nerve impairment to extremity: change in color, persistent numbness, tingling, coldness or increase pain   If any calf pain, calf tightness, shortness of breath, chest pain   Any difficulty breathing at rest or with ambulation, any chest tightness/soreness  Severe intractable pain, persistent swelling or drainage, development of a wound, incisional redness, finger/toe swelling or color changes, or CALF PAIN    · Perform ankle pumps with non-surgical/non-injured extremity to help reduce the risk of blood clots    · Please follow up in 1 week for staple removal    · Continue taking Asprin as directed      · Prescription for Norco 7.5/325 has been provided. Please take medication as directed    · Patient can expect to be out of work for at least 6 weeks            Patient has been discussed with Dr. Dinora Scott during this visit and he agrees with the assessment and plan    Patient expresses understanding of the plan. Patient education provided on post surgical care. REVIEW OF SYSTEMS:     Otherwise as noted in HPI      PAST MEDICAL HISTORY:     Past Medical History:   Diagnosis Date    Anemia     Chronic pain     knees and ankles    GERD (gastroesophageal reflux disease)     during pregancy    Headache     Hemorrhoid     History of body piercing     Hypercholesterolemia     IUD (intrauterine device) in place     Migraine headache     SIFUENTES (nonalcoholic steatohepatitis) (per outside records) 6/11/2017    denies liver disease 5/22/2018    Tendinitis        MEDICATIONS:     Current Outpatient Prescriptions   Medication Sig    HYDROcodone-acetaminophen (NORCO) 7.5-325 mg per tablet TAKE ONE TO TWO TABLETS BY MOUTH Q 4 - 6 HRS PRN PAIN **AFTER SURGERY**  Indications: Pain    promethazine (PHENERGAN) 25 mg tablet Take 1 Tab by mouth every six (6) hours as needed for Nausea.  polyethylene glycol (MIRALAX) 17 gram packet Take 1 Packet by mouth daily. Mix in 8 oz prune juice    loratadine (CLARITIN) 10 mg tablet Take 10 mg by mouth daily as needed for Allergies.  triamcinolone (NASACORT) 55 mcg nasal inhaler 2 Sprays daily as needed.  multivitamin (ONE A DAY) tablet Take 1 Tab by mouth daily.  cholecalciferol, VITAMIN D3, (VITAMIN D3) 5,000 unit tab tablet Take  by mouth every Wednesday.  naproxen (NAPROSYN) 500 mg tablet Take 1 Tab by mouth two (2) times daily (with meals).  (Patient taking differently: Take 500 mg by mouth two (2) times daily as needed.)     No current facility-administered medications for this visit. ALLERGIES:     Allergies   Allergen Reactions    Pcn [Penicillins] Hives, Swelling and Other (comments)     Throat closes    Shellfish Derived Hives, Swelling and Other (comments)     Throat closes         PAST SURGICAL HISTORY:     Past Surgical History:   Procedure Laterality Date    HX OTHER SURGICAL      Tooth extraction (molars)       SOCIAL HISTORY:     Social History     Social History    Marital status: UNKNOWN     Spouse name: N/A    Number of children: N/A    Years of education: N/A     Occupational History    Not on file.      Social History Main Topics    Smoking status: Never Smoker    Smokeless tobacco: Never Used    Alcohol use Yes      Comment: rare    Drug use: No    Sexual activity: Not on file     Other Topics Concern    Not on file     Social History Narrative       FAMILY HISTORY:     Family History   Problem Relation Age of Onset    Diabetes Mother     Hypertension Mother     Bipolar Disorder Mother     Elevated Lipids Father     COPD Maternal Grandmother     Emphysema Maternal Grandmother     Lung Disease Maternal Grandmother     No Known Problems Maternal Grandfather     Stroke Paternal Grandmother     No Known Problems Paternal Grandfather     Allergic Rhinitis Daughter     Eczema Daughter     Headache Daughter            Jojo [place], [place]  6/20/2018

## 2018-06-27 ENCOUNTER — DOCUMENTATION ONLY (OUTPATIENT)
Dept: ORTHOPEDIC SURGERY | Age: 38
End: 2018-06-27

## 2018-06-27 ENCOUNTER — OFFICE VISIT (OUTPATIENT)
Dept: ORTHOPEDIC SURGERY | Age: 38
End: 2018-06-27

## 2018-06-27 VITALS
OXYGEN SATURATION: 100 % | SYSTOLIC BLOOD PRESSURE: 126 MMHG | TEMPERATURE: 97.6 F | RESPIRATION RATE: 16 BRPM | HEART RATE: 83 BPM | DIASTOLIC BLOOD PRESSURE: 78 MMHG | HEIGHT: 69 IN

## 2018-06-27 DIAGNOSIS — Z98.890 POST-OPERATIVE STATE: ICD-10-CM

## 2018-06-27 DIAGNOSIS — M76.71 TENDINITIS OF RIGHT PERONEUS BREVIS TENDON: Primary | ICD-10-CM

## 2018-06-27 NOTE — PATIENT INSTRUCTIONS
· Dressing: skin staples removed in the office today. Patient placed in short CAM boot     · You may shower in 2 days. No submerging in any water    · Keep all pets away from  any wound present in order to prevent infection. · Continue Activity modification    · Weight bearing status:  non weight bearing to the right lower extremity    · No lifting, twisting, squatting, deep bending, driving or strenuous activity.     · Perform ankle pumps with non-surgical/non-injured extremity to help reduce the risk of blood clots    · Please follow up in 3 weeks    · Continue taking Asprin as directed

## 2018-06-27 NOTE — MR AVS SNAPSHOT
[address] [place] [address], [address] 200 [facility] 
[phone] Patient: Oma Jaramillo MRN: [id number] JSZ:9/87/6384 Visit Information Date & Time Provider Department Dept. Phone Encounter #  
 6/27/2018  9:45  Bobby Momin Seema S Bobby Pate [facility] [phone] [id number] Follow-up Instructions Return in about 3 weeks (around 7/18/2018) for follow up evaluation. Upcoming Health Maintenance Date Due DTaP/Tdap/Td series (1 - Tdap) 8/28/2001 PAP AKA CERVICAL CYTOLOGY 8/28/2001 Influenza Age 5 to Adult 8/1/2018 Allergies as of 6/27/2018  Review Complete On: 6/27/2018 By: Anel Womack Severity Noted Reaction Type Reactions Pcn [Penicillins] High 05/12/2017    Hives, Swelling, Other (comments) Throat closes Shellfish Derived High 05/12/2017    Hives, Swelling, Other (comments) Throat closes Current Immunizations  Never Reviewed No immunizations on file. Not reviewed this visit You Were Diagnosed With   
  
 Codes Comments Tendinitis of right peroneus brevis tendon    -  Primary ICD-10-CM: M76.71 
ICD-9-CM: 727.06 Post-operative state     ICD-10-CM: F87.925 ICD-9-CM: V45.89 Vitals BP Pulse Temp Resp Height(growth percentile) SpO2  
 126/78 83 97.6 °F (36.4 °C) (Oral) 16 5' 9\" (1.753 m) 100% OB Status Smoking Status IUD Never Smoker Preferred Pharmacy Pharmacy Name Phone [facility], [address] [place] [address] [phone] Your Updated Medication List  
  
   
This list is accurate as of 6/27/18 11:00 AM.  Always use your most recent med list.  
  
  
  
  
 cholecalciferol (VITAMIN D3) 5,000 unit Tab tablet Commonly known as:  VITAMIN D3 Take  by mouth every Wednesday. CLARITIN 10 mg tablet Generic drug:  loratadine Take 10 mg by mouth daily as needed for Allergies. HYDROcodone-acetaminophen 7.5-325 mg per tablet Commonly known as:  NORCO  
TAKE ONE TO TWO TABLETS BY MOUTH Q 4 - 6 HRS PRN PAIN **AFTER SURGERY**  Indications: Pain  
  
 multivitamin tablet Commonly known as:  ONE A DAY Take 1 Tab by mouth daily. naproxen 500 mg tablet Commonly known as:  NAPROSYN Take 1 Tab by mouth two (2) times daily (with meals). NASACORT 55 mcg nasal inhaler Generic drug:  triamcinolone 2 Sprays daily as needed. polyethylene glycol 17 gram packet Commonly known as:  Roalnd Martino Take 1 Packet by mouth daily. Mix in 8 oz prune juice  
  
 promethazine 25 mg tablet Commonly known as:  PHENERGAN Take 1 Tab by mouth every six (6) hours as needed for Nausea. Follow-up Instructions Return in about 3 weeks (around 7/18/2018) for follow up evaluation. Patient Instructions · Dressing: skin staples removed in the office today. Patient placed in short CAM boot · You may shower in 2 days. No submerging in any water · Keep all pets away from  any wound present in order to prevent infection. · Continue Activity modification · Weight bearing status:  non weight bearing to the right lower extremity · No lifting, twisting, squatting, deep bending, driving or strenuous activity. · Perform ankle pumps with non-surgical/non-injured extremity to help reduce the risk of blood clots · Please follow up in 3 weeks · Continue taking Asprin as directed   
 
  
  
 
 
 
  
Introducing [facility] & HE[facility] SERVICES! Dear Pedro Davis: 
Thank you for requesting a CrowdMed account. Our records indicate that you already have an active CrowdMed account. You can access your account anytime at [email or web address] Did you know that you can access your hospital and ER discharge instructions at any time in CrowdMed?   You can also review all of your test results from your hospital stay or ER visit. Additional Information If you have questions, please visit the Frequently Asked Questions section of the [employer] website at [email or web address] Remember, Agency Entourage is NOT to be used for urgent needs. For medical emergencies, dial 911. Now available from your iPhone and Android! Please provide this summary of care documentation to your next provider. Your primary care clinician is listed as Porter Esteves. If you have any questions after today's visit, please call [phone].

## 2018-06-27 NOTE — PROGRESS NOTES
Patient: Avril Sanchez                MRN: [id number]       SSN: [id number]  YOB: 1980           AGE: 40 y.o. SEX: female    Mak Ledesma MD    POST OP OFFICE NOTE  DOS: 6/1/18    Chief Complaint:   Chief Complaint   Patient presents with    Leg Pain     right leg pain       HPI:     The patient is a 40 y.o. female who presents today for follow up 26 days s/p:     1. RIGHT PERONEAL BREVIS AND LONGUS TENDON TENOSYNOVECTOMIES   2. PRIMARY REPAIR OF PERONEAL BREVIS TENDON  3. EXCISION OF BUTTON HOLE CENTRAL PERONEAL TENDON TEAR      Patient has been NWB to the right lower extremity. Patient reports doing well other than post op pain. Patient denies any fever, chest pain, shortness of breath or calf pain. There are no new illness or injuries to report since last seen in the office. Patient is on Aspirin for DVT prophylaxis. Patient can expect to be out of work for at least the next 4 weeks. She may possibly return to sedentary duty sooner depending on her recovery. PHYSICAL EXAM:     Visit Vitals    /78    Pulse 83    Temp 97.6 °F (36.4 °C) (Oral)    Resp 16    Ht 5' 9\" (1.753 m)    SpO2 100%       GEN:  Alert, well developed, well nourished, well appearing 40 y.o. female in no acute distress. PSYCH:  Normal affect, mood, and conduct. alert, oriented x 3 alert, oriented x 3, no dementia  M/S EXAMINATION OF: right foot/ankle  DRESSINGS: CDI   DRAINAGE: none  INCISION: Incision looks good, skin well approximated, no dehiscence, skin staples in place without disruption. SKIN: mild edema , no erythema, no  ecchymosis, no warmth    TENDERNESS:  mild tenderness to palpation   NEUROVASCULAR:  grossly intact. Positive distal pulses and capillary refill. DVT ASSESSMENT:  The calf is not tender to palpation. No evidence of DVT seen on physical exam.  ROM: not tested       RADIOGRAPHS & DIAGNOSTIC STUDIES     No results found for any visits on 06/27/18.       IMPRESSION: No diagnosis found. PLAN:         No orders of the defined types were placed in this encounter.                 · Dressing: skin staples removed in the office today. Patient placed in short CAM boot     · You may shower in 2 days. No submerging in any water    · Keep all pets away from  any wound present in order to prevent infection. · Continue Activity modification    · Weight bearing status:  non weight bearing to the right lower extremity    · No lifting, twisting, squatting, deep bending, driving or strenuous activity. · Perform ankle pumps with non-surgical/non-injured extremity to help reduce the risk of blood clots    · Please follow up in 2 weeks    · Continue taking Asprin as directed      · Patient can expect to be out of work for at least 4 more weeks            Patient has been discussed with Dr. Marko Thurston during this visit and he agrees with the assessment and plan    Patient expresses understanding of the plan. Patient education provided on post surgical care. REVIEW OF SYSTEMS:     Otherwise as noted in HPI      PAST MEDICAL HISTORY:     Past Medical History:   Diagnosis Date    Anemia     Chronic pain     knees and ankles    GERD (gastroesophageal reflux disease)     during pregancy    Headache     Hemorrhoid     History of body piercing     Hypercholesterolemia     IUD (intrauterine device) in place     Migraine headache     SIFUENTES (nonalcoholic steatohepatitis) (per outside records) 6/11/2017    denies liver disease 5/22/2018    Tendinitis        MEDICATIONS:     Current Outpatient Prescriptions   Medication Sig    HYDROcodone-acetaminophen (NORCO) 7.5-325 mg per tablet TAKE ONE TO TWO TABLETS BY MOUTH Q 4 - 6 HRS PRN PAIN **AFTER SURGERY**  Indications: Pain    promethazine (PHENERGAN) 25 mg tablet Take 1 Tab by mouth every six (6) hours as needed for Nausea.  polyethylene glycol (MIRALAX) 17 gram packet Take 1 Packet by mouth daily.  Mix in 8 oz prune juice    loratadine (CLARITIN) 10 mg tablet Take 10 mg by mouth daily as needed for Allergies.  triamcinolone (NASACORT) 55 mcg nasal inhaler 2 Sprays daily as needed.  multivitamin (ONE A DAY) tablet Take 1 Tab by mouth daily.  naproxen (NAPROSYN) 500 mg tablet Take 1 Tab by mouth two (2) times daily (with meals). (Patient taking differently: Take 500 mg by mouth two (2) times daily as needed.)    cholecalciferol, VITAMIN D3, (VITAMIN D3) 5,000 unit tab tablet Take  by mouth every Wednesday. No current facility-administered medications for this visit. ALLERGIES:     Allergies   Allergen Reactions    Pcn [Penicillins] Hives, Swelling and Other (comments)     Throat closes    Shellfish Derived Hives, Swelling and Other (comments)     Throat closes         PAST SURGICAL HISTORY:     Past Surgical History:   Procedure Laterality Date    HX OTHER SURGICAL      Tooth extraction (molars)       SOCIAL HISTORY:     Social History     Social History    Marital status: UNKNOWN     Spouse name: N/A    Number of children: N/A    Years of education: N/A     Occupational History    Not on file.      Social History Main Topics    Smoking status: Never Smoker    Smokeless tobacco: Never Used    Alcohol use Yes      Comment: rare    Drug use: No    Sexual activity: Not on file     Other Topics Concern    Not on file     Social History Narrative       FAMILY HISTORY:     Family History   Problem Relation Age of Onset    Diabetes Mother     Hypertension Mother     Bipolar Disorder Mother     Elevated Lipids Father     COPD Maternal Grandmother     Emphysema Maternal Grandmother     Lung Disease Maternal Grandmother     No Known Problems Maternal Grandfather     Stroke Paternal Grandmother     No Known Problems Paternal Grandfather     Allergic Rhinitis Daughter     Eczema Daughter     Headache Daughter            Jose Eduardo Harris  6/27/2018

## 2018-06-27 NOTE — PROGRESS NOTES
Patient dropped off disability paperwork at the [facility] office. Please call for  when complete.

## 2018-06-29 ENCOUNTER — TELEPHONE (OUTPATIENT)
Dept: ORTHOPEDIC SURGERY | Age: 38
End: 2018-06-29

## 2018-06-29 NOTE — TELEPHONE ENCOUNTER
If the patient is a restless sleeper, she should sleep with the boot on. If she is a restful sleeper and does not move around much, then she can sleep without the boot on. However, if she gets up to go to the bathroom, she will need to put the boot on. Surprisingly, the patient may find that she is more comfortable sleeping in the boot.      Comfort Chavez PA-C  6/29/2018   10:13 AM

## 2018-06-29 NOTE — TELEPHONE ENCOUNTER
Patient notified that she must wear boot at all times unless she is just sitting around. She was advised to sleep in the boot as well.

## 2018-06-29 NOTE — TELEPHONE ENCOUNTER
Post op Patient mom , Katelyn Beal ( on [employer] ) called for either  Isra , or a Nurse. Ms. Tiffany Melendez said that the patient is supposed to be wearing a boot per  and Eloy Donaldson. , but the patient removed the boot. And that the patient does not want to put the boot back on. Ms. Tiffany Melendez said that she and her daughter ( the Patient) have been having heated discussions about this. Ms. Tiffany Melendez said that the patient foot is not stable , but the patient does not want to hear it when she tells her to put the boot back on.     patient mom Ms. Tiffany Melendez would like to know if the patient is supposed to have the boot on 24 hours or how long. Ms. Tiffany Melendez would like someone to call her back so that they can advise the patient to place the boot back on. Jollyjohnathan Bautista tel. [phone]. Note: patient had sx done by  and Eloy Donaldson on 06/01/18.

## 2018-07-03 ENCOUNTER — DOCUMENTATION ONLY (OUTPATIENT)
Dept: ORTHOPEDIC SURGERY | Age: 38
End: 2018-07-03

## 2018-07-16 ENCOUNTER — OFFICE VISIT (OUTPATIENT)
Dept: ORTHOPEDIC SURGERY | Age: 38
End: 2018-07-16

## 2018-07-16 VITALS
OXYGEN SATURATION: 96 % | TEMPERATURE: 98 F | SYSTOLIC BLOOD PRESSURE: 131 MMHG | DIASTOLIC BLOOD PRESSURE: 89 MMHG | HEIGHT: 69 IN | HEART RATE: 94 BPM | RESPIRATION RATE: 16 BRPM

## 2018-07-16 DIAGNOSIS — M76.71 TENDINITIS OF RIGHT PERONEUS BREVIS TENDON: Primary | ICD-10-CM

## 2018-07-16 DIAGNOSIS — Z98.890 POST-OPERATIVE STATE: ICD-10-CM

## 2018-07-16 NOTE — PATIENT INSTRUCTIONS
· You may weight bear as tolerated in the CAM boot    · Order provided for formal PT    · Keep all pets away from  any wound present in order to prevent infection. · Continue Activity modification    · Weight bearing status:   weight bearing as tolerated to the right lower extremity in boot    · No lifting, twisting, squatting, deep bending, driving or strenuous activity.     · Perform ankle pumps with non-surgical/non-injured extremity to help reduce the risk of blood clots    · Please follow up in 3 weeks

## 2018-07-16 NOTE — LETTER
NOTIFICATION RETURN TO WORK / SCHOOL 
 
7/16/2018 3:23 PM 
 
Ms. Alee Marte [facility] [address] [address] To Whom It May Concern: 
 
Alee Marte is currently under the care of [address] [place] [address] Elvis Veronica. She will remain out X 6 weeks. If there are questions or concerns please have the patient contact our office. Sincerely, Lionel Albrecht PA-C

## 2018-07-16 NOTE — MR AVS SNAPSHOT
[address] [place] [address], [address] [facility] 
[phone] Patient: Michael Welsh MRN: [id number] PLD:[birth year]10[birth year]94 Visit Information Date & Time Provider Department Dept. Phone Encounter #  
 7/16/2018  2:15 PM Ani Mckeon, [address] [facility] [phone] [id number] Follow-up Instructions Return in about 3 weeks (around 8/6/2018) for follow up evaluation. Upcoming Health Maintenance Date Due DTaP/Tdap/Td series (1 - Tdap) 8/28/2001 PAP AKA CERVICAL CYTOLOGY 8/28/2001 Influenza Age 5 to Adult 8/1/2018 Allergies as of 7/16/2018  Review Complete On: 7/16/2018 By: Ysabel Dacosta Severity Noted Reaction Type Reactions Pcn [Penicillins] High 05/12/2017    Hives, Swelling, Other (comments) Throat closes Shellfish Derived High 05/12/2017    Hives, Swelling, Other (comments) Throat closes Current Immunizations  Never Reviewed No immunizations on file. Not reviewed this visit You Were Diagnosed With   
  
 Codes Comments Tendinitis of right peroneus brevis tendon    -  Primary ICD-10-CM: M76.71 
ICD-9-CM: 727.06 Post-operative state     ICD-10-CM: E04.086 ICD-9-CM: V45.89 Vitals BP Pulse Temp Resp Height(growth percentile) SpO2  
 131/89 94 98 °F (36.7 °C) (Oral) 16 5' 9\" (1.753 m) 96% OB Status Smoking Status IUD Never Smoker Preferred Pharmacy Pharmacy Name Phone [facility], [address] [place] [address] [phone] Your Updated Medication List  
  
   
This list is accurate as of 7/16/18  3:24 PM.  Always use your most recent med list.  
  
  
  
  
 cholecalciferol (VITAMIN D3) 5,000 unit Tab tablet Commonly known as:  VITAMIN D3 Take  by mouth every Wednesday. CLARITIN 10 mg tablet Generic drug:  loratadine Take 10 mg by mouth daily as needed for Allergies. HYDROcodone-acetaminophen 7.5-325 mg per tablet Commonly known as:  NORCO  
TAKE ONE TO TWO TABLETS BY MOUTH Q 4 - 6 HRS PRN PAIN **AFTER SURGERY**  Indications: Pain  
  
 multivitamin tablet Commonly known as:  ONE A DAY Take 1 Tab by mouth daily. naproxen 500 mg tablet Commonly known as:  NAPROSYN Take 1 Tab by mouth two (2) times daily (with meals). NASACORT 55 mcg nasal inhaler Generic drug:  triamcinolone 2 Sprays daily as needed. polyethylene glycol 17 gram packet Commonly known as:  Marce Emlvi Take 1 Packet by mouth daily. Mix in 8 oz prune juice  
  
 promethazine 25 mg tablet Commonly known as:  PHENERGAN Take 1 Tab by mouth every six (6) hours as needed for Nausea. We Performed the Following REFERRAL TO PHYSICAL THERAPY [XDH93 Custom] Comments:  
 7/16/2018 
3:05 PM 
 
Evaluate and treat. Start with gentle ROM. WBAT in CAM boot 2-3 x per week for 4-6 weeks Follow-up Instructions Return in about 3 weeks (around 8/6/2018) for follow up evaluation. Referral Information Referral ID Referred By Referred To  
  
 [id number] Alexia KATHLEEN Not Available Visits Status Start Date End Date 1 New Request 7/16/18 7/16/19 If your referral has a status of pending review or denied, additional information will be sent to support the outcome of this decision. Patient Instructions · You may weight bear as tolerated in the CAM boot · Order provided for formal PT 
 
· Keep all pets away from  any wound present in order to prevent infection. · Continue Activity modification · Weight bearing status:   weight bearing as tolerated to the right lower extremity in boot · No lifting, twisting, squatting, deep bending, driving or strenuous activity. · Perform ankle pumps with non-surgical/non-injured extremity to help reduce the risk of blood clots · Please follow up in 3 weeks 
 
  
  
 
 
 
  
Introducing [facility]! Dear Raheel Mcconnell: 
Thank you for requesting a [employer] account. Our records indicate that you already have an active [employer] account. You can access your account anytime at [email or web address] [email or web address] Did you know that you can access your hospital and ER discharge instructions at any time in [employer]? You can also review all of your test results from your hospital stay or ER visit. Additional Information If you have questions, please visit the Frequently Asked Questions section of the [employer] website at [email or web address]/"[email or web address]/. Remember, [employer] is NOT to be used for urgent needs. For medical emergencies, dial 911. Now available from your iPhone and Android! Please provide this summary of care documentation to your next provider. Your primary care clinician is listed as Maria Hoffmann. If you have any questions after today's visit, please call [phone].

## 2018-07-16 NOTE — PROGRESS NOTES
Patient: Juliano Lynn                MRN: [id number]       SSN: [id number]  YOB: 1980           AGE: 40 y.o. SEX: female    Li Wilson MD    POST OP OFFICE NOTE  DOS: 6/1/18    Chief Complaint:   Chief Complaint   Patient presents with    Foot Pain     right foot f/u        HPI:     The patient is a 40 y.o. female who presents today for follow up 45 days s/p:     1. RIGHT PERONEAL BREVIS AND LONGUS TENDON TENOSYNOVECTOMIES   2. PRIMARY REPAIR OF PERONEAL BREVIS TENDON  3. EXCISION OF BUTTON HOLE CENTRAL PERONEAL TENDON TEAR      Patient has been NWB to the right lower extremity. Patient reports doing well. Patient denies any fever, chest pain, shortness of breath or calf pain. There are no new illness or injuries to report since last seen in the office. Patient is on Aspirin for DVT prophylaxis. Patient can expect to be out of work for at least the next 6 weeks as she attends PT for range of motion with progression to strengthening, balance and coordination. Patient states that she works 10 hour days on her feet in steel toe shoes. She understands that she will not be ready for this level of activity for at least 6 weeks. PHYSICAL EXAM:     Visit Vitals    /89    Pulse 94    Temp 98 °F (36.7 °C) (Oral)    Resp 16    Ht 5' 9\" (1.753 m)    SpO2 96%       GEN:  Alert, well developed, well nourished, well appearing 40 y.o. female in no acute distress. PSYCH:  Normal affect, mood, and conduct. alert, oriented x 3 alert, oriented x 3, no dementia  M/S EXAMINATION OF: right foot/ankle  DRESSINGS: CDI   DRAINAGE: none  INCISION: Incision looks good, skin well approximated, no dehiscence, some scabbing and dry skin  SKIN: mild edema , no erythema, no  ecchymosis, no warmth    TENDERNESS:  mild tenderness to palpation   NEUROVASCULAR:  grossly intact. Positive distal pulses and capillary refill. DVT ASSESSMENT:  The calf is not tender to palpation.  No evidence of DVT seen on physical exam.  ROM: limited       RADIOGRAPHS & DIAGNOSTIC STUDIES     No results found for any visits on 07/16/18. IMPRESSION:     Encounter Diagnoses     ICD-10-CM ICD-9-CM   1. Tendinitis of right peroneus brevis tendon M76.71 727.06   2. Post-operative state Z98.890 V45.89       PLAN:         Orders Placed This Encounter    REFERRAL TO PHYSICAL THERAPY                     · You may weight bear as tolerated in the CAM boot    · Order provided for formal PT    · Complete gentle PF/DF exercises at home. No IV/EV exercises at this time    · Keep all pets away from  any wound present in order to prevent infection. · Continue Activity modification    · Weight bearing status:   weight bearing as tolerated to the right lower extremity in boot    · No lifting, twisting, squatting, deep bending, driving or strenuous activity. · Perform ankle pumps with non-surgical/non-injured extremity to help reduce the risk of blood clots    · Please follow up in 3 weeks - plan for progression to [facility] [place] [address] [facility]    Patient has been discussed with Dr. Cristy Garcia during this visit and he agrees with the assessment and plan    Patient expresses understanding of the plan. Patient education provided on post surgical care.       REVIEW OF SYSTEMS:     Otherwise as noted in HPI      PAST MEDICAL HISTORY:     Past Medical History:   Diagnosis Date    Anemia     Chronic pain     knees and ankles    GERD (gastroesophageal reflux disease)     during pregancy    Headache     Hemorrhoid     History of body piercing     Hypercholesterolemia     IUD (intrauterine device) in place     Migraine headache     SIFUENTES (nonalcoholic steatohepatitis) (per outside records) 6/11/2017    denies liver disease 5/22/2018    Tendinitis        MEDICATIONS:     Current Outpatient Prescriptions   Medication Sig    HYDROcodone-acetaminophen (NORCO) 7.5-325 mg per tablet TAKE ONE TO TWO TABLETS BY MOUTH Q 4 - 6 HRS PRN PAIN **AFTER SURGERY**  Indications: Pain    promethazine (PHENERGAN) 25 mg tablet Take 1 Tab by mouth every six (6) hours as needed for Nausea.  polyethylene glycol (MIRALAX) 17 gram packet Take 1 Packet by mouth daily. Mix in 8 oz prune juice    loratadine (CLARITIN) 10 mg tablet Take 10 mg by mouth daily as needed for Allergies.  triamcinolone (NASACORT) 55 mcg nasal inhaler 2 Sprays daily as needed.  multivitamin (ONE A DAY) tablet Take 1 Tab by mouth daily.  naproxen (NAPROSYN) 500 mg tablet Take 1 Tab by mouth two (2) times daily (with meals). (Patient taking differently: Take 500 mg by mouth two (2) times daily as needed.)    cholecalciferol, VITAMIN D3, (VITAMIN D3) 5,000 unit tab tablet Take  by mouth every Wednesday. No current facility-administered medications for this visit. ALLERGIES:     Allergies   Allergen Reactions    Pcn [Penicillins] Hives, Swelling and Other (comments)     Throat closes    Shellfish Derived Hives, Swelling and Other (comments)     Throat closes         PAST SURGICAL HISTORY:     Past Surgical History:   Procedure Laterality Date    HX OTHER SURGICAL      Tooth extraction (molars)       SOCIAL HISTORY:     Social History     Social History    Marital status: UNKNOWN     Spouse name: N/A    Number of children: N/A    Years of education: N/A     Occupational History    Not on file.      Social History Main Topics    Smoking status: Never Smoker    Smokeless tobacco: Never Used    Alcohol use Yes      Comment: rare    Drug use: No    Sexual activity: Not on file     Other Topics Concern    Not on file     Social History Narrative       FAMILY HISTORY:     Family History   Problem Relation Age of Onset    Diabetes Mother     Hypertension Mother     Bipolar Disorder Mother     Elevated Lipids Father     COPD Maternal Grandmother     Emphysema Maternal Grandmother     Lung Disease Maternal Grandmother     No Known Problems Maternal Grandfather     Stroke Paternal Grandmother     No Known Problems Paternal Grandfather     Allergic Rhinitis Daughter     Eczema Daughter     Headache Daughter            Fred Durham  7/16/2018

## 2018-07-20 ENCOUNTER — HOSPITAL ENCOUNTER (OUTPATIENT)
Dept: PHYSICAL THERAPY | Age: 38
Discharge: HOME | End: 2018-07-20
Payer: OTHER GOVERNMENT

## 2018-07-20 PROCEDURE — 97110 THERAPEUTIC EXERCISES: CPT

## 2018-07-20 PROCEDURE — 97535 SELF CARE MNGMENT TRAINING: CPT

## 2018-07-20 PROCEDURE — 97161 PT EVAL LOW COMPLEX 20 MIN: CPT

## 2018-07-20 PROCEDURE — 97116 GAIT TRAINING THERAPY: CPT

## 2018-07-20 NOTE — PROGRESS NOTES
PT DAILY TREATMENT NOTE/FOOT AND ANKLE EVAL 3-16    Patient Name: Olivier Lorenzo  Date:2018  : 1980  [x]  Patient  Verified  Payor: Oscar Servant / Plan: José Higgins / Product Type: Federal Funded Programs /    [employer] time:10:53am  Total Treatment Time (min): 48  Total Timed Codes (min): 28  1:1 Treatment Time ( only): NA   Visit #: 1 of 12    Treatment Area: Peroneal tendinitis, right leg [M76.71]  Other specified postprocedural states [Z98.890]    SUBJECTIVE  Pain Level (0-10 scale): 3  [x]constant []intermittent []improving []worsening []no change since onset    Any medication changes, allergies to medications, adverse drug reactions, diagnosis change, or new procedure performed?: [x] No    [] Yes (see summary sheet for update)  Subjective functional status/changes:    Pt reports spraining her ankle in 2018 with subsequent chronic tendonitis and an apparent peroneal tendon tear that was discovered after multiple referrals. She is now s/p surgical repair on 2018. She reports she has been NWB in her CAM boot with B axillary crutches since and was just released to now be WBAT.      PLOF: active as a , void of limitations with ambulation  Limitations to PLOF: limited WB tolerance, ambulating NWB primarily with cam boot and axillary crutches  Mechanism of Injury: see above  Current symptoms/Complaints: R ankle aching pain and stiffness, weakness in R leg  Previous Treatment/Compliance: CAM boot with B axillary crutches, CAM boot use for several month prior to surgery  PMHx/Surgical Hx: unremarkable per pt report  Work Hx: see intake  Living Situation: stairs into home  Pt Goals: see intake  Barriers: []pain []financial []time []transportation [x]other fearful of WB activity  Motivation: appears motivated and cooperative  Substance use: []Alcohol []Tobacco []other:   FABQ Score: []low []elevate  Cognition: A & O x 4 Other: OBJECTIVE/EXAMINATION  Domestic Life:   Activity/Recreational Limitations:   Mobility:   Self Care:     20 min [x]Eval                  []Re-Eval     10 min Therapeutic Exercise:  [x] See flow sheet : HEP creation and instruction per handout   Rationale: increase ROM and increase strength to improve the patients ability to improve ease of ADLs. 8 min Gait Trainin feet with B axillary crutch device on level surfaces with supervision level of assist   Rationale: improve ease of ambulation with axillary crutches and facilitate progression of WB. Self Care: 10 minutes pt education regarding relevant anatomy, diagnosis, prognosis, plan of care, activity modification, incision site care & change of bandages, modality use, and instruction regarding progressive WB activity to facilitate pt self management.           With   [] TE   [] TA   [] neuro   [] other: Patient Education: [x] Review HEP    [] Progressed/Changed HEP based on:   [] positioning   [] body mechanics   [] transfers   [] heat/ice application    [] other:      Other Objective/Functional Measures:    Physical Therapy Evaluation  - Foot and Ankle    Gait: [] Normal    [x] Abnormal    [x] Antalgic    [] NWB    Device: B axillary crutches  Describe: limited WB on the RLE with axillary crutches and CAM boot    ROM/Strength  [] Unable to assess at this time      AROM        PROM            Strength (1-5)   Left Right Left Right Left  Right   Dorsiflexion 6 -16 8 -13 5 3-   Plantarflexion 55 40  42 5 3-   Inversion 34 12  15 5 3   Eversion 14 12  15 5 2+   Great Toe Ext 75 40  42     Great Toe Flex 25 10  12       Flexibility: [] Unable to assess at this time  Gastroc:    (L) Tightness [] WNL   [] Min   [] Mod   [] Severe    (R) Tightness [] WNL   [] Min   [] Mod   [x] Severe  Soleus:    (L) Tightness [] WNL   [] Min   [] Mod   [] Severe    (R) Tightness [] WNL   [] Min   [] Mod   [x] Severe  Other:      (L) Tightness [] WNL   [] Min   [] Mod [] Severe    (R) Tightness [] WNL   [] Min   [] Mod   [] Severe    Palpation:   Location:   Patient's Pain Response: [] Min   [] Mod   [] Severe  Location:   Patient's Pain Response: [] Min   [] Mod   [] Severe    Optional Tests:  Balance/Stork Test: touches/60sec (L): NT (R): NT    Single Leg Hop:   (L) Distance(ft):  (R) Distance(ft):  (L)/(R)%:   (L) Time(sec):  (R) Time(sec): (L)/(R)%:      Sub-talor alignment: [] Neutral     [] Pronation      [] Supination    Forefoot alignment:  [] Neutral     [] Varus            [] Valgus    Swelling:   Left (cm) Right (cm)   Figure 8:  Gross R foot/ankle edema compared to contralateral side, not formally assessed   Midfoot:      Malleoli Level:     MTH:        Anterior Drawer: [] Neg    [] Pos  Posterior Drawer:  [] Neg    [] Pos  Inversion Stress:  [] Neg    [] Pos  Talar Tilt:   [] Neg    [] Pos  Eversion Stress:  [] Neg    [] Pos  Franki's Sign:  [] Neg    [] Pos  Scott Test: [] Neg    [] Pos    Other tests/ comments:  Incision appears very dry with one area mid incision that appears to have cracked and is bleeding light sanguinous drainage, covered with sterile adhesive bandages and instructed pt to cover with light protective ointment and light bandaging until closed. No signs of infection noted. Pain Level (0-10 scale) post treatment: 3    ASSESSMENT/Changes in Function: Per POC. Patient will continue to benefit from skilled PT services to modify and progress therapeutic interventions, address functional mobility deficits, address ROM deficits, address strength deficits, analyze and address soft tissue restrictions, analyze and cue movement patterns, analyze and modify body mechanics/ergonomics and instruct in home and community integration to attain remaining goals. [x]  See Plan of Care  []  See progress note/recertification  []  See Discharge Summary         Progress towards goals / Updated goals:  Per POC.     PLAN  []  Upgrade activities as tolerated [x]  Continue plan of care  []  Update interventions per flow sheet       []  Discharge due to:_  [x]  Other:_Begin with emphasis on ROM progression and WB tolerance in cam boot.         Miriam Urena, PT, DPT, ATC 7/20/2018  10:08 AM

## 2018-07-24 ENCOUNTER — APPOINTMENT (OUTPATIENT)
Dept: PHYSICAL THERAPY | Age: 38
End: 2018-07-24
Payer: OTHER GOVERNMENT

## 2018-07-25 ENCOUNTER — HOSPITAL ENCOUNTER (OUTPATIENT)
Dept: PHYSICAL THERAPY | Age: 38
Discharge: HOME | End: 2018-07-25
Payer: OTHER GOVERNMENT

## 2018-07-25 PROCEDURE — 97116 GAIT TRAINING THERAPY: CPT

## 2018-07-25 PROCEDURE — 97140 MANUAL THERAPY 1/> REGIONS: CPT

## 2018-07-25 PROCEDURE — 97016 VASOPNEUMATIC DEVICE THERAPY: CPT

## 2018-07-25 PROCEDURE — 97110 THERAPEUTIC EXERCISES: CPT

## 2018-07-25 NOTE — PROGRESS NOTES
PT DAILY TREATMENT NOTE     Patient Name: Milli Jameson  Date:2018  : 1980  [x]  Patient  Verified  Payor: [employer] / Plan: Montemayor Star / Product Type: Federal Funded Programs /    In time:11:00  Out time:11:51  Total Treatment Time (min): 51  Visit #: 2 of 12    Treatment Area: Peroneal tendinitis, right leg [M76.71]  Other specified postprocedural states [Z98.890]    SUBJECTIVE  Pain Level (0-10 scale):  2  Any medication changes, allergies to medications, adverse drug reactions, diagnosis change, or new procedure performed?: [x] No    [] Yes (see summary sheet for update)  Subjective functional status/changes:   [] No changes reported  \"I'm doing good\"    OBJECTIVE    Modality rationale: decrease edema and decrease pain to improve the patients ability to perform daily tasks    Min Type Additional Details    [] Estim:  []Unatt       []IFC  []Premod                        []Other:  []w/ice   []w/heat  Position:  Location:    [] Estim: []Att    []TENS instruct  []NMES                    []Other:  []w/US   []w/ice   []w/heat  Position:  Location:    []  Traction: [] Cervical       []Lumbar                       [] Prone          []Supine                       []Intermittent   []Continuous Lbs:  [] before manual  [] after manual    []  Ultrasound: []Continuous   [] Pulsed                           []1MHz   []3MHz W/cm2:  Location:    []  Iontophoresis with dexamethasone         Location: [] Take home patch   [] In clinic    []  Ice     []  heat  []  Ice massage  []  Laser   []  Anodyne Position:  Location:    []  Laser with stim  []  Other:  Position:  Location:   10 [x]  Vasopneumatic Device Pressure:       [x] lo [] med [] hi   Temperature: [x] lo [] med [] hi   [] Skin assessment post-treatment:  []intact []redness- no adverse reaction    []redness  adverse reaction:         25 min Therapeutic Exercise:  [] See flow sheet :   Rationale: increase ROM and increase strength to improve the patients ability to perform daily tasks    8 min Manual Therapy:  PROM right ankle DF/PF  Gentle IV/EV, gastroc stretching   Rationale: increase ROM and increase tissue extensibility to improve ADL ease    8 min Gait Trainin'feet with single AC device on level surfaces with SBA level of assist   Rationale: improve gait efficiency          With   [] TE   [] TA   [] neuro   [] other: Patient Education: [x] Review HEP    [] Progressed/Changed HEP based on:   [] positioning   [] body mechanics   [] transfers   [] heat/ice application    [] other:      Other Objective/Functional Measures: incision site shows no signs of poor healing     Pain Level (0-10 scale) post treatment: 0    ASSESSMENT/Changes in Function: Pt with good tolerance to all therex today, she shows some challenge sequencing single AC ambulation but good stability. Progress with mobility and ROM for improved gait ease. Patient will continue to benefit from skilled PT services to modify and progress therapeutic interventions, address functional mobility deficits, address ROM deficits, address strength deficits, analyze and address soft tissue restrictions, analyze and cue movement patterns, analyze and modify body mechanics/ergonomics and address imbalance/dizziness to attain remaining goals. []  See Plan of Care  []  See progress note/recertification  []  See Discharge Summary         Progress towards goals / Updated goals:  Short Term Goals: To be accomplished in 2 weeks:                         1. Patient will report performance of HEP at least 2 times per day to facilitate improved outcomes and improved self management. Pt reports compliance 18                        2. Pt will demonstrate R ankle PROM to neutral DF, PF of 50 or better to improve ease of WB activity and ambulation.                         3.Pt will demonstrate ability to ambulate with FWB in CAM boot void of axillary crutches to facilitate progression towards normalized ambulation and functional WB activity.     Long Term Goals: To be accomplished in 6 weeks:                         1. Patient will report FOTO score of 61 or better to indicate significant improvement in functional status. 2. Pt will demonstrate 10 degrees R ankle DF PROM to facilitate normal gait mechanics. 3. Pt will demonstrate Ability to ambulate for 300 feet with normal gait mechanics to improve ease of community ambulation. 4. Pt will demonstrate 4/5 R ankle strength in all planes to improve stability during ambulation.     PLAN  []  Upgrade activities as tolerated     []  Continue plan of care  []  Update interventions per flow sheet       []  Discharge due to:_  []  Other:_      Marilee Marks DPT, CMTPT 7/25/2018  11:12 AM    Future Appointments  Date Time Provider Archana Carson   7/31/2018 2:00 PM [address] [facility]   8/2/2018 3:00 PM [address] [facility]   8/6/2018 2:15 PM DENA Carl [address]   8/7/2018 8:30 AM Hyun Khalil [facility]   8/9/2018 3:00 PM [address] [facility]   8/14/2018 3:00 PM [address] [facility]   8/16/2018 3:00 PM [address] [facility]   8/21/2018 3:00 PM [address] [facility]   8/23/2018 3:00 PM [address] [facility]   8/28/2018 3:00 PM [address] [facility]   8/30/2018 3:00 PM Hyun Khalil [facility]

## 2018-07-31 ENCOUNTER — HOSPITAL ENCOUNTER (OUTPATIENT)
Dept: PHYSICAL THERAPY | Age: 38
Discharge: HOME | End: 2018-07-31
Payer: OTHER GOVERNMENT

## 2018-07-31 PROCEDURE — 97112 NEUROMUSCULAR REEDUCATION: CPT

## 2018-07-31 PROCEDURE — 97016 VASOPNEUMATIC DEVICE THERAPY: CPT

## 2018-07-31 PROCEDURE — 97110 THERAPEUTIC EXERCISES: CPT

## 2018-07-31 PROCEDURE — 97140 MANUAL THERAPY 1/> REGIONS: CPT

## 2018-07-31 NOTE — PROGRESS NOTES
PT DAILY TREATMENT NOTE 12- Patient Name: Halie Ayala Date:2018 : 1980 [x]  Patient  Verified Payor: Lori Angelucci / Plan: Pat Mott / Product Type: Federal Funded Programs / In time: 2:00pm  Out time:2:59pm 
Total Treatment Time (min): 59 Visit #: 3 of 12 Treatment Area: Peroneal tendinitis, right leg [M76.71] Other specified postprocedural states [Z98.890] SUBJECTIVE Pain Level (0-10 scale): 3 Any medication changes, allergies to medications, adverse drug reactions, diagnosis change, or new procedure performed?: [x] No    [] Yes (see summary sheet for update) Subjective functional status/changes:   [] No changes reported \"It's upset because I've been on it, but its ok. \" OBJECTIVE 31 min Therapeutic Exercise:  [x] See flow sheet :  
Rationale: increase ROM and increase strength to improve the patients ability to improve ease of ADLs. 10 min Neuromuscular Re-education:  [x]  See flow sheet :  
Rationale: increase strength, improve coordination, improve balance and increase proprioception  to improve the patients ability to improve stability upon return to WB activity. 8 min Manual Therapy:  R ankle PROM, grade 1-2 Talocrural DF mobilizations, manual gastroc/soleus stretch Rationale: decrease pain, increase ROM and increase tissue extensibility to improve ankle mobility in preparation for return to ambulation. Modality rationale: decrease edema, decrease inflammation and decrease pain to improve the patients ability to improve ease of ADLs. Min Type Additional Details  
 [] Estim:  []Unatt       []IFC  []Premod []Other:  []w/ice   []w/heat Position: Location:  
 [] Estim: []Att    []TENS instruct  []NMES []Other:  []w/US   []w/ice   []w/heat Position: Location:  
 []  Traction: [] Cervical       []Lumbar 
                     [] Prone          []Supine                      []Intermittent []Continuous Lbs: 
[] before manual 
[] after manual  
 []  Ultrasound: []Continuous   [] Pulsed []1MHz   []3MHz Location: 
W/cm2:  
 []  Iontophoresis with dexamethasone Location: [] Take home patch  
[] In clinic  
 []  Ice     []  heat 
[]  Ice massage 
[]  Laser  
[]  Anodyne Position: Location:  
 []  Laser with stim 
[]  Other: Position: Location:  
10 [x]  Vasopneumatic Device Pressure:       [] lo [x] med [] hi  
Temperature: [x] lo [] med [] hi  
[] Skin assessment post-treatment:  []intact []redness- no adverse reaction 
  []redness  adverse reaction:  
       
With 
 [] TE 
 [] TA 
 [] neuro 
 [] other: Patient Education: [x] Review HEP [] Progressed/Changed HEP based on:  
[] positioning   [] body mechanics   [] transfers   [] heat/ice application   
[] other:   
 
Other Objective/Functional Measures: grossly improving ankle DF ROM, remains limited Improved WB tolerance, but still limited by pain provocation Pain Level (0-10 scale) post treatment: 0 
 
ASSESSMENT/Changes in Function: Pt demonstrates improved ankle ROM and WB tolerance, remains limited by pain with WB in boot. Advised pt to begin slowly weaning from her 1 remaining axillary crutch as tolerated. Patient will continue to benefit from skilled PT services to modify and progress therapeutic interventions, address functional mobility deficits, address ROM deficits, address strength deficits, analyze and address soft tissue restrictions, analyze and cue movement patterns, address imbalance/dizziness and instruct in home and community integration to attain remaining goals. []  See Plan of Care 
[]  See progress note/recertification 
[]  See Discharge Summary Progress towards goals / Updated goals: 
Short Term Goals: To be accomplished in 2 weeks: 
                       1.  Patient will report performance of HEP at least 2 times per day to facilitate improved outcomes and improved self management. Pt reports compliance 7/25/18 
                      2. Pt will demonstrate R ankle PROM to neutral DF, PF of 50 or better to improve ease of WB activity and ambulation. 
                      3.Pt will demonstrate ability to ambulate with FWB in CAM boot void of axillary crutches to facilitate progression towards normalized ambulation and functional WB activity. 
   
Long Term Goals: To be accomplished in 6 weeks: 
                       1. Patient will report FOTO score of 61 or better to indicate significant improvement in functional status.                        6. Pt will demonstrate 10 degrees R ankle DF PROM to facilitate normal gait mechanics.                        8. Pt will demonstrate Ability to ambulate for 300 feet with normal gait mechanics to improve ease of community ambulation. 
                       4. Pt will demonstrate 4/5 R ankle strength in all planes to improve stability during ambulation. PLAN 
[]  Upgrade activities as tolerated     [x]  Continue plan of care 
[]  Update interventions per flow sheet      
[]  Discharge due to:_ 
[]  Other:_ Jonathan Pritchard, PT, DPT, ATC 7/31/2018  2:10 PM 
 
Future Appointments Date Time Provider Archana Carson 8/2/2018 3:00 PM [address] [place] [address] [facility]  
8/6/2018 2:15 PM DENA Preciado [address]  
[facility]2018 8[facility]30 [facility]  
8/9/2018 3:00 PM  [address] [facility]  
8/14/2018 3:00 [facility]  
8/16/2018 3:00 PM [address] [facility]  
8/21/2018 3:00 PM Jonathan [facility]  
8/23/2018 3:00 PM  [address] [facility]  
8/28/2018 3:00 [facility]  
8/30/2018 3:00 PM Jonathan [facility]

## 2018-08-02 ENCOUNTER — HOSPITAL ENCOUNTER (OUTPATIENT)
Dept: PHYSICAL THERAPY | Age: 38
Discharge: HOME | End: 2018-08-02
Payer: OTHER GOVERNMENT

## 2018-08-02 PROCEDURE — 97112 NEUROMUSCULAR REEDUCATION: CPT

## 2018-08-02 PROCEDURE — 97140 MANUAL THERAPY 1/> REGIONS: CPT

## 2018-08-02 PROCEDURE — 97110 THERAPEUTIC EXERCISES: CPT

## 2018-08-02 NOTE — PROGRESS NOTES
PT DAILY TREATMENT NOTE  Patient Name: Jolly Beth Date:2018 : 1980 [x]  Patient  Verified Payor: Matthew Enriquez / Plan: Mindy Opitz / Product Type: Federal Funded Programs / In time:3:00pm  Out time:3:56pm 
Total Treatment Time (min): 56 Visit #: 4 of 12 Treatment Area: Peroneal tendinitis, right leg [M76.71] Other specified postprocedural states [Z98.890] SUBJECTIVE Pain Level (0-10 scale): 4 Any medication changes, allergies to medications, adverse drug reactions, diagnosis change, or new procedure performed?: [x] No    [] Yes (see summary sheet for update) Subjective functional status/changes:   [] No changes reported \"It's a bit swollen. \" Pt report she has been walking more lately with her boot. \" OBJECTIVE 38 min Therapeutic Exercise:  [x] See flow sheet :  
Rationale: increase ROM and increase strength to improve the patients ability to improve ADL ease. 10 min Neuromuscular Re-education:  [x]  See flow sheet :  
Rationale: increase ROM, increase strength and improve coordination  to improve the patients ability to improve ease of ADLs 8 min Manual Therapy:  R ankle PROM, talocrural post mobilizations grade 2-3, manual gastroc stretch Rationale: decrease pain, increase ROM and increase tissue extensibility to improve ease of ambulation. PD modalities With 
 [] TE 
 [] TA 
 [] neuro 
 [] other: Patient Education: [x] Review HEP [] Progressed/Changed HEP based on:  
[] positioning   [] body mechanics   [] transfers   [] heat/ice application   
[x] other:  Advised pt to modify amount of time ambulating as needed if her foot is significantly painful and swelling. Also advised continued elevation periodically for edema control. Other Objective/Functional Measures: -6/-4 degrees active vs passive ankle DF Pain Level (0-10 scale) post treatment: 0 
 
ASSESSMENT/Changes in Function: Pt is slowly progressing with ankle ROM but remains limited with DF as above with firm end feel. Advised pt to add low load long duration stretching to her HEP with verbal instructions and demonstration provided. Pt verbalized understanding. She does demonstrate some improved WB tolerance, but remains limited somewhat by pain and edema. Patient will continue to benefit from skilled PT services to modify and progress therapeutic interventions, address functional mobility deficits, address ROM deficits, address strength deficits, analyze and address soft tissue restrictions, analyze and cue movement patterns, address imbalance/dizziness and instruct in home and community integration to attain remaining goals. []  See Plan of Care 
[]  See progress note/recertification 
[]  See Discharge Summary Progress towards goals / Updated goals: 
Short Term Goals: To be accomplished in 2 weeks: 
                       1. Patient will report performance of HEP at least 2 times per day to facilitate improved outcomes and improved self management. Pt reports compliance 7/25/18 
                      2. Pt will demonstrate R ankle PROM to neutral DF, PF of 50 or better to improve ease of WB activity and ambulation. 
                      3.Pt will demonstrate ability to ambulate with FWB in CAM boot void of axillary crutches to facilitate progression towards normalized ambulation and functional WB activity. 
   
Long Term Goals: To be accomplished in 6 weeks: 
                       1. Patient will report FOTO score of 61 or better to indicate significant improvement in functional status.                        2. Pt will demonstrate 10 degrees R ankle DF PROM to facilitate normal gait mechanics.  
                       6. Pt will demonstrate Ability to ambulate for 300 feet with normal gait mechanics to improve ease of community ambulation. 
                       4. Pt will demonstrate 4/5 R ankle strength in all planes to improve stability during ambulation. PLAN 
[]  Upgrade activities as tolerated     [x]  Continue plan of care 
[]  Update interventions per flow sheet      
[]  Discharge due to:_ 
[]  Other:_ Shelley Shah, PT, DPT, ATC 8/2/2018  3:05 PM 
 
Future Appointments Date Time Provider Archana Carson 8/6/2018 2:15 PM [facility]  
8/7/2018 8:30 AM Shelley Shah [facility]  
8/9/2018 3:00 PM [address] [facility]  
8/14/2018 3:00 PM [address] [facility]  
8/16/2018 3:00 PM [address] [facility]  
8/21/2018 3:00 PM [address] [facility]  
8/23/2018 3:00 PM Teresa Mendosa, PT [facility]  
8/28/2018 3:00 PM Teresa Mendosa, ADA [facility]  
8/30/2018 3:00 PM Teresa Mendosa, PT [facility]

## 2018-08-06 ENCOUNTER — OFFICE VISIT (OUTPATIENT)
Dept: ORTHOPEDIC SURGERY | Age: 38
End: 2018-08-06

## 2018-08-06 VITALS
TEMPERATURE: 98.8 F | OXYGEN SATURATION: 97 % | DIASTOLIC BLOOD PRESSURE: 71 MMHG | RESPIRATION RATE: 16 BRPM | HEART RATE: 68 BPM | WEIGHT: 235 LBS | HEIGHT: 69 IN | SYSTOLIC BLOOD PRESSURE: 113 MMHG | BODY MASS INDEX: 34.8 KG/M2

## 2018-08-06 DIAGNOSIS — M76.71 TENDINITIS OF RIGHT PERONEUS BREVIS TENDON: Primary | ICD-10-CM

## 2018-08-06 DIAGNOSIS — Z98.890 POST-OPERATIVE STATE: ICD-10-CM

## 2018-08-06 NOTE — PROGRESS NOTES
Patient: Jolly Beth                MRN: [id number]       SSN: [id number]  YOB: 1980           AGE: 40 y.o. SEX: female    Isabel Hopson MD    POST OP OFFICE NOTE  DOS: 6/1/18    Chief Complaint:   Chief Complaint   Patient presents with    Foot Pain     right foot f/u        HPI:     The patient is a 40 y.o. female who presents today for follow up 77 days s/p:     1. RIGHT PERONEAL BREVIS AND LONGUS TENDON TENOSYNOVECTOMIES   2. PRIMARY REPAIR OF PERONEAL BREVIS TENDON  3. EXCISION OF BUTTON HOLE CENTRAL PERONEAL TENDON TEAR      Patient has been NWB to the right lower extremity. Patient reports doing well. Patient denies any fever, chest pain, shortness of breath or calf pain. There are no new illness or injuries to report since last seen in the office. Patient continues to improve in PT for range of motion with progression to strengthening, balance and coordination. Patient states that she continues to have some swelling to right ankle. PHYSICAL EXAM:     Visit Vitals    /71    Pulse 68    Temp 98.8 °F (37.1 °C) (Oral)    Resp 16    Ht 5' 9\" (1.753 m)    Wt 235 lb (106.6 kg)    SpO2 97%    BMI 34.7 kg/m2       GEN:  Alert, well developed, well nourished, well appearing 40 y.o. female in no acute distress. PSYCH:  Normal affect, mood, and conduct. alert, oriented x 3 alert, oriented x 3, no dementia  M/S EXAMINATION OF: right foot/ankle  DRESSINGS: CDI   DRAINAGE: none  INCISION: Incision looks good, skin healing well  SKIN: mild edema , no erythema, no  ecchymosis, no warmth    TENDERNESS:  mild tenderness to palpation   NEUROVASCULAR:  grossly intact. Positive distal pulses and capillary refill. DVT ASSESSMENT:  The calf is not tender to palpation. No evidence of DVT seen on physical exam.  ROM: limited but improving       RADIOGRAPHS & DIAGNOSTIC STUDIES     No results found for any visits on 08/06/18.       IMPRESSION:     Encounter Diagnoses ICD-10-CM ICD-9-CM   1. Tendinitis of right peroneus brevis tendon M76.71 727.06   2. Post-operative state Z98.890 V45.89       PLAN:         Orders Placed This Encounter    Generic Supply Order                     · You may weight bear as tolerated in the CAM boot with transition to ASAC brace in supportive shoe/sneaker    · Continue formal PT    · No lifting, twisting, squatting, deep bending, driving or strenuous activity. · Perform ankle pumps with non-surgical/non-injured extremity to help reduce the risk of blood clots    · Please follow up in 3 weeks          Patient has been discussed with Dr. Benjamin Favre during this visit and he agrees with the assessment and plan    Patient expresses understanding of the plan. Patient education provided on post surgical care. REVIEW OF SYSTEMS:     Otherwise as noted in HPI      PAST MEDICAL HISTORY:     Past Medical History:   Diagnosis Date    Anemia     Chronic pain     knees and ankles    GERD (gastroesophageal reflux disease)     during pregancy    Headache     Hemorrhoid     History of body piercing     Hypercholesterolemia     IUD (intrauterine device) in place     Migraine headache     SIFUENTES (nonalcoholic steatohepatitis) (per outside records) 6/11/2017    denies liver disease 5/22/2018    Tendinitis        MEDICATIONS:     Current Outpatient Prescriptions   Medication Sig    HYDROcodone-acetaminophen (NORCO) 7.5-325 mg per tablet TAKE ONE TO TWO TABLETS BY MOUTH Q 4 - 6 HRS PRN PAIN **AFTER SURGERY**  Indications: Pain    promethazine (PHENERGAN) 25 mg tablet Take 1 Tab by mouth every six (6) hours as needed for Nausea.  polyethylene glycol (MIRALAX) 17 gram packet Take 1 Packet by mouth daily. Mix in 8 oz prune juice    loratadine (CLARITIN) 10 mg tablet Take 10 mg by mouth daily as needed for Allergies.  triamcinolone (NASACORT) 55 mcg nasal inhaler 2 Sprays daily as needed.  multivitamin (ONE A DAY) tablet Take 1 Tab by mouth daily.  naproxen (NAPROSYN) 500 mg tablet Take 1 Tab by mouth two (2) times daily (with meals). (Patient taking differently: Take 500 mg by mouth two (2) times daily as needed.)    cholecalciferol, VITAMIN D3, (VITAMIN D3) 5,000 unit tab tablet Take  by mouth every Wednesday. No current facility-administered medications for this visit. ALLERGIES:     Allergies   Allergen Reactions    Pcn [Penicillins] Hives, Swelling and Other (comments)     Throat closes    Shellfish Derived Hives, Swelling and Other (comments)     Throat closes         PAST SURGICAL HISTORY:     Past Surgical History:   Procedure Laterality Date    HX OTHER SURGICAL      Tooth extraction (molars)       SOCIAL HISTORY:     Social History     Social History    Marital status: UNKNOWN     Spouse name: N/A    Number of children: N/A    Years of education: N/A     Occupational History    Not on file.      Social History Main Topics    Smoking status: Never Smoker    Smokeless tobacco: Never Used    Alcohol use Yes      Comment: rare    Drug use: No    Sexual activity: Not on file     Other Topics Concern    Not on file     Social History Narrative       FAMILY HISTORY:     Family History   Problem Relation Age of Onset    Diabetes Mother     Hypertension Mother     Bipolar Disorder Mother     Elevated Lipids Father     COPD Maternal Grandmother     Emphysema Maternal Grandmother     Lung Disease Maternal Grandmother     No Known Problems Maternal Grandfather     Stroke Paternal Grandmother     No Known Problems Paternal Grandfather     Allergic Rhinitis Daughter     Eczema Daughter     Headache Daughter            Fred Mayo  8/6/2018

## 2018-08-06 NOTE — PATIENT INSTRUCTIONS
· You may weight bear as tolerated in the CAM boot with transition to [facility] in supportive shoe/sneaker    · Tubigrip provided for swelling    · Continue formal PT    · No lifting, twisting, squatting, deep bending, driving or strenuous activity.     · Perform ankle pumps with non-surgical/non-injured extremity to help reduce the risk of blood clots    · Please follow up in 3 weeks

## 2018-08-06 NOTE — MR AVS SNAPSHOT
[address], [address] [facility] 
[phone] Patient: Gurinder Garcias MRN: [id number] CHANTALE:[birth year] Visit Information Date & Time Provider Department Dept. Phone Encounter #  
 8/6/2018  2:15 PM Iwona Momin [address] [facility] [phone] Follow-up Instructions Return in about 3 weeks (around 8/27/2018) for follow up evaluation. Follow-up and Disposition History Your Appointments 9/4/2018  1:00 PM  
POST OP with Iwona Momin PA-C  
[address]14 [facility], [address] and [address] [facility] ([address]) Appt Note: rt foot 4 wk fu  
 [facility] [place] [address] [facility]  
[phone] 1212 [facility], [address] Upcoming Health Maintenance Date Due DTaP/Tdap/Td series (1 - Tdap) 8/28/2001 PAP AKA CERVICAL CYTOLOGY 8/28/2001 Influenza Age 5 to Adult 8/1/2018 Allergies as of 8/6/2018  Review Complete On: 8/6/2018 By: Brandt Aguilar Severity Noted Reaction Type Reactions Pcn [Penicillins] High 05/12/2017    Hives, Swelling, Other (comments) Throat closes Shellfish Derived High 05/12/2017    Hives, Swelling, Other (comments) Throat closes Current Immunizations  Never Reviewed No immunizations on file. Not reviewed this visit You Were Diagnosed With   
  
 Codes Comments Tendinitis of right peroneus brevis tendon    -  Primary ICD-10-CM: M76.71 
ICD-9-CM: 727.06 Post-operative state     ICD-10-CM: X11.020 ICD-9-CM: V45.89 Vitals BP Pulse Temp Resp Height(growth percentile) Weight(growth percentile) 113/71 68 98.8 °F (37.1 °C) (Oral) 16 5' 9\" (1.753 m) 235 lb (106.6 kg) SpO2 BMI OB Status Smoking Status 97% 34.7 kg/m2 IUD Never Smoker BMI and BSA Data Body Mass Index Body Surface Area  34.7 kg/m 2 2.28 m 2  
  
 Preferred Pharmacy Pharmacy Name Phone [facility], [place] [phone] Your Updated Medication List  
  
   
This list is accurate as of 8/6/18  3:26 PM.  Always use your most recent med list.  
  
  
  
  
 cholecalciferol (VITAMIN D3) 5,000 unit Tab tablet Commonly known as:  VITAMIN D3 Take  by mouth every Wednesday. CLARITIN 10 mg tablet Generic drug:  loratadine Take 10 mg by mouth daily as needed for Allergies. HYDROcodone-acetaminophen 7.5-325 mg per tablet Commonly known as:  NORCO  
TAKE ONE TO TWO TABLETS BY MOUTH Q 4 - 6 HRS PRN PAIN **AFTER SURGERY**  Indications: Pain  
  
 multivitamin tablet Commonly known as:  ONE A DAY Take 1 Tab by mouth daily. naproxen 500 mg tablet Commonly known as:  NAPROSYN Take 1 Tab by mouth two (2) times daily (with meals). NASACORT 55 mcg nasal inhaler Generic drug:  triamcinolone 2 Sprays daily as needed. polyethylene glycol 17 gram packet Commonly known as:  Lelan Situ Take 1 Packet by mouth daily. Mix in 8 oz prune juice  
  
 promethazine 25 mg tablet Commonly known as:  PHENERGAN Take 1 Tab by mouth every six (6) hours as needed for Nausea. We Performed the Following AMB SUPPLY ORDER [[id number] Custom] Comments:  
 8/6/2018 
3:18 PM 
 
[place] [facility] Follow-up Instructions Return in about 3 weeks (around 8/27/2018) for follow up evaluation.   
  
To-Do List   
 08/07/2018 8:30 AM  
  Appointment with [employer] at [facility] PT [address] [place] [address] ([phone])  
  
 08/09/2018 3:00 PM  
  Appointment with [employer] at [facility] PT [facility] ([phone])  
  
 08/14/2018 3:00 PM  
  Appointment with [employer] at [facility] ([phone])  
  
 08/16/2018 3:00 PM  
  Appointment with [employer] at [address] ([phone])  
  
 08/21/2018 3:00 PM  
 Appointment with Madina Zambrano at [facility] ([phone])  
  
 08/23/2018 3:00 PM  
  Appointment with Vamsi Sanchez, PT at [address] ([phone])  
  
 08/28/2018 3:00 PM  
  Appointment with Vamsi Sanchez, PT at [address] ([phone])  
  
 08/30/2018 3:00 PM  
  Appointment with Vamsi Sanchez PT at [address] ([phone]) Patient Instructions · You may weight bear as tolerated in the CAM boot with transition to [facility] brace in supportive shoe/sneaker · Tubigrip provided for swelling · Continue formal PT 
 
· No lifting, twisting, squatting, deep bending, driving or strenuous activity. · Perform ankle pumps with non-surgical/non-injured extremity to help reduce the risk of blood clots · Please follow up in 3 weeks 
  
  
 
 
 Patient Instructions History Introducing [facility]! Dear Ivan Lezama: 
Thank you for requesting a [employer] account. Our records indicate that you already have an active Mof[employer] account. You can access your account anytime at [email or web address] Did you know that you can access your hospital and ER discharge instructions at any time in Mofang? You can also review all of your test results from your hospital stay or ER visit. Additional Information If you have questions, please visit the Frequently Asked Questions section of the [employer] website at [email or web address] Remember, M[employer]ang is NOT to be used for urgent needs. For medical emergencies, dial 911. Now available from your iPhone and Android! Please provide this summary of care documentation to your next provider. Your primary care clinician is listed as Silvia King. If you have any questions after today's visit, please call [phone].

## 2018-08-07 ENCOUNTER — HOSPITAL ENCOUNTER (OUTPATIENT)
Dept: PHYSICAL THERAPY | Age: 38
Discharge: HOME | End: 2018-08-07
Payer: OTHER GOVERNMENT

## 2018-08-07 PROCEDURE — 97112 NEUROMUSCULAR REEDUCATION: CPT

## 2018-08-07 PROCEDURE — 97140 MANUAL THERAPY 1/> REGIONS: CPT

## 2018-08-07 PROCEDURE — 97110 THERAPEUTIC EXERCISES: CPT

## 2018-08-07 NOTE — PROGRESS NOTES
PT DAILY TREATMENT NOTE     Patient Name: Anrdea Estes  Date:2018  : 1980  [x]  Patient  Verified  Payor: Douglas Durán / Plan: Nara Snowball / Product Type: Federal Funded Programs /    In time:8:30am  Out time:9:17am  Total Treatment Time (min): 47  Visit #: 5 of 12    Treatment Area: Peroneal tendinitis, right leg [M76.71]  Other specified postprocedural states [Z98.890]    SUBJECTIVE  Pain Level (0-10 scale): 2  Any medication changes, allergies to medications, adverse drug reactions, diagnosis change, or new procedure performed?: [x] No    [] Yes (see summary sheet for update)  Subjective functional status/changes:   [] No changes reported  \"I got released from my boot. I still use the one crutch\"    OBJECTIVE  29 min Therapeutic Exercise:  [x] See flow sheet :   Rationale: increase ROM and increase strength to improve the patients ability to improve ease of ADLs. 10 min Neuromuscular Re-education:  [x]  See flow sheet :   Rationale: increase strength and improve coordination  to improve the patients ability to improve ease of ambulation. 8 min Manual Therapy:  R ankle PROM all planes, talocrural dorsiflexion mobilizations grade 2-4, manual gastroc stretch   Rationale: decrease pain, increase ROM and increase tissue extensibility to improve ease of ADLs. With   [] TE   [] TA   [] neuro   [] other: Patient Education: [x] Review HEP    [] Progressed/Changed HEP based on:   [] positioning   [] body mechanics   [] transfers   [] heat/ice application    [] other:      Other Objective/Functional Measures: R ankle AROM/PROM: DF 1/4 degrees, PF 40/44    Progressing into WB activity void of boot, continues to demonstrate impaired gait mechanics 2/2 pain and limited ankle ROM, utilize 1 axillary crutch assist     Pain Level (0-10 scale) post treatment: 3    ASSESSMENT/Changes in Function: Pt demonstrates improving ankle ROM, but remains limited.  Will plan to continue progressing with ankle ROM emphasis and gradual progression into WB activity as tolerated. Patient will continue to benefit from skilled PT services to modify and progress therapeutic interventions, address functional mobility deficits, address ROM deficits, address strength deficits, analyze and address soft tissue restrictions, analyze and cue movement patterns, analyze and modify body mechanics/ergonomics, address imbalance/dizziness and instruct in home and community integration to attain remaining goals. []  See Plan of Care  []  See progress note/recertification  []  See Discharge Summary         Progress towards goals / Updated goals:  Short Term Goals: To be accomplished in 2 weeks:                         1. Patient will report performance of HEP at least 2 times per day to facilitate improved outcomes and improved self management. Pt reports compliance 7/25/18                        2. Pt will demonstrate R ankle PROM to neutral DF, PF of 50 or better to improve ease of WB activity and ambulation. Near met 4 degrees DF, 44 PF 8/7/2018                        3.Pt will demonstrate ability to ambulate with FWB in CAM boot void of axillary crutches to facilitate progression towards normalized ambulation and functional WB activity. Progressing, ambulating void of boot with 1 axillary crutch 8/7/2018      Long Term Goals: To be accomplished in 6 weeks:                         1. Patient will report FOTO score of 61 or better to indicate significant improvement in functional status.                        4. Pt will demonstrate 10 degrees R ankle DF PROM to facilitate normal gait mechanics. Progressing, 4 degrees 8/7/2018                         3. Pt will demonstrate Ability to ambulate for 300 feet with normal gait mechanics to improve ease of community ambulation.                         4. Pt will demonstrate 4/5 R ankle strength in all planes to improve stability during ambulation.     PLAN  [x] Upgrade activities as tolerated     [x]  Continue plan of care  []  Update interventions per flow sheet       []  Discharge due to:_  []  Other:_      Dakotah Frey, PT, DPT, ATC 8/7/2018  8:36 AM    Future Appointments  Date Time Provider Archana Carson   8/9/2018 3:00 PM [address] [facility]   8/14/2018 3:00 PM [address] [facility]   8/16/2018 3:00 PM [address] [facility]   8/21/2018 3:00 PM [address] [facility]   8/23/2018 3:00 PM Luis Manuel Rios, PT [facility]   8/28/2018 3:00 PM Luis Manuel Rios, PT [facility]   8/30/2018 3:00 PM Luis Manuel Rios, PT [facility]   9/4/2018 1:00  DENA Galaviz [address]

## 2018-08-09 ENCOUNTER — HOSPITAL ENCOUNTER (OUTPATIENT)
Dept: PHYSICAL THERAPY | Age: 38
Discharge: HOME | End: 2018-08-09
Payer: OTHER GOVERNMENT

## 2018-08-09 PROCEDURE — 97110 THERAPEUTIC EXERCISES: CPT

## 2018-08-09 PROCEDURE — 97140 MANUAL THERAPY 1/> REGIONS: CPT

## 2018-08-09 PROCEDURE — 97112 NEUROMUSCULAR REEDUCATION: CPT

## 2018-08-09 NOTE — PROGRESS NOTES
PT DAILY TREATMENT NOTE 12    Patient Name: Allen Ramirez  Date:2018  : 1980  [x]  Patient  Verified  Payor: Timur Cervantes / Plan: Adriano Winslow / Product Type: Federal Funded Programs /    In time:3:05pm  Out time:3:54pm  Total Treatment Time (min): 49  Visit #: 6 of 12    Treatment Area: Peroneal tendinitis, right leg [M76.71]  Other specified postprocedural states [Z98.890]    SUBJECTIVE  Pain Level (0-10 scale): 1  Any medication changes, allergies to medications, adverse drug reactions, diagnosis change, or new procedure performed?: [x] No    [] Yes (see summary sheet for update)  Subjective functional status/changes:   [] No changes reported  Pt reports minimal to no ankle pain today but reports onset of pain \"in the arch\" of her right foot starting yesterday. OBJECTIVE  31 min Therapeutic Exercise:  [] See flow sheet :   Rationale: increase ROM and increase strength to improve the patients ability to improve ease of ADLs. 10 min Neuromuscular Re-education:  [x]  See flow sheet :   Rationale: increase ROM, increase strength and improve coordination  to improve the patients ability to improve ease of ADLs. 8 min Manual Therapy:  Plantar fascia STM on R, R ankle DF mobilizations grade 2-4, 1st MTP ext mobs, manual gastroc/soleus stretch   Rationale: decrease pain, increase ROM and increase tissue extensibility to improve ease of ambulation. With   [] TE   [] TA   [] neuro   [] other: Patient Education: [x] Review HEP    [] Progressed/Changed HEP based on:   [] positioning   [] body mechanics   [] transfers   [] heat/ice application    [x] other: advised pt to gradually progress into WB activity as tolerated and modify as needed with regards to duration if plantar foot pain is limiting.  In addition, instructed her in use of frozen water bottle for self massage of plantar foot pain     Other Objective/Functional Measures: TTP medial plantar aspect of R foot    Continues to demonstrate limitations in talocrural DF ROM and 1st MTP ext ROM   Progressing into WB activity with some limitations in terminal DF limiting normal gait pattern    (-) crepitation with palpatory examination of plantar aspect of foot    Pain Level (0-10 scale) post treatment: 2    ASSESSMENT/Changes in Function:  Pt is progressing into WB activity, however, she is limited by residual deficits in ankle DF and 1st MTP ext ROM with what appears to be some mild plantar fascial irritation contributing to her discomfort. Continue with emphasis on ankle and foot ROM and gradual progression of WB    Patient will continue to benefit from skilled PT services to modify and progress therapeutic interventions, address functional mobility deficits, address ROM deficits, address strength deficits, analyze and address soft tissue restrictions, analyze and cue movement patterns and analyze and modify body mechanics/ergonomics to attain remaining goals. []  See Plan of Care  []  See progress note/recertification  []  See Discharge Summary         Progress towards goals / Updated goals:  Short Term Goals: To be accomplished in 2 weeks:                         8. Patient will report performance of HEP at least 2 times per day to facilitate improved outcomes and improved self management. Pt reports compliance 7/25/18                        2. Pt will demonstrate R ankle PROM to neutral DF, PF of 50 or better to improve ease of WB activity and ambulation. Near met 4 degrees DF, 44 PF 8/7/2018                        3.Pt will demonstrate ability to ambulate with FWB in CAM boot void of axillary crutches to facilitate progression towards normalized ambulation and functional WB activity. Progressing, ambulating void of boot with 1 axillary crutch 8/7/2018      Long Term Goals: To be accomplished in 6 weeks:                         1.  Patient will report FOTO score of 61 or better to indicate significant improvement in functional status.                        8. Pt will demonstrate 10 degrees R ankle DF PROM to facilitate normal gait mechanics. Progressing, 4 degrees 8/7/2018                         3. Pt will demonstrate Ability to ambulate for 300 feet with normal gait mechanics to improve ease of community ambulation. Progressing with ambulation void of AD, but remains limited with impaired gait mechanics 8/9/2018                         4. Pt will demonstrate 4/5 R ankle strength in all planes to improve stability during ambulation.     PLAN  []  Upgrade activities as tolerated     [x]  Continue plan of care  []  Update interventions per flow sheet       []  Discharge due to:_  []  Other:_      [facility], PT, DPT, ATC 8/9/2018  4:54 PM    Future Appointments  Date Time Provider Archana Carson   8/14/2018 3:00 PM [address] [facility]   8/16/2018 3:00 PM [address] [facility]   8/21/2018 3:00 PM [address] [facility]   8/23/2018 3:00 [facility]   8/28/2018 3:00 PM [facility]   8/30/2018 3:00 PM [facility]   9/4/2018 1:00 PM DENA Reyes 69

## 2018-08-14 ENCOUNTER — HOSPITAL ENCOUNTER (OUTPATIENT)
Dept: PHYSICAL THERAPY | Age: 38
Discharge: HOME | End: 2018-08-14
Payer: OTHER GOVERNMENT

## 2018-08-14 PROCEDURE — 97112 NEUROMUSCULAR REEDUCATION: CPT

## 2018-08-14 PROCEDURE — 97140 MANUAL THERAPY 1/> REGIONS: CPT

## 2018-08-14 PROCEDURE — 97110 THERAPEUTIC EXERCISES: CPT

## 2018-08-14 NOTE — PROGRESS NOTES
PT DAILY TREATMENT NOTE     Patient Name: Lia Perez  Date:2018  : 1980  [x]  Patient  Verified  Payor: Joi Leslie / Plan: Melissa Kiet / Product Type: Federal Funded Programs /    In time:3:02pm  Out time:3:53pm  Total Treatment Time (min): 51  Visit #: 7 of 12    Treatment Area: Peroneal tendinitis, right leg [M76.71]  Other specified postprocedural states [Z98.890]    SUBJECTIVE  Pain Level (0-10 scale): 2  Any medication changes, allergies to medications, adverse drug reactions, diagnosis change, or new procedure performed?: [x] No    [] Yes (see summary sheet for update)  Subjective functional status/changes:   [] No changes reported  Pt reports some continued plantar foot pain with ambulation. OBJECTIVE  33 min Therapeutic Exercise:  [x] See flow sheet :   Rationale: increase ROM and increase strength to improve the patients ability to improve ADL ease. 10 min Neuromuscular Re-education:  [x]  See flow sheet :   Rationale: increase strength, improve coordination, improve balance and increase proprioception  to improve the patients ability to improve ease of ambulation. 8 min Manual Therapy:  Plantar fascia STM on R, R ankle DF mobilizations grade 2-4, 1st MTP ext mobs, manual gastroc/soleus stretch   Rationale: decrease pain, increase ROM and increase tissue extensibility to improve ease of ambulation.            With   [] TE   [] TA   [] neuro   [] other: Patient Education: [x] Review HEP    [] Progressed/Changed HEP based on:   [] positioning   [] body mechanics   [] transfers   [] heat/ice application    [] other:      Other Objective/Functional Measures: mildly antalgic gait pattern    Ankle Strength: IV 4+/5, DF 4+/5, EV 4/5, PF NT    Discomfort in post ankle and dorsal aspect of foot with seated HR/TR     Pain Level (0-10 scale) post treatment: 2    ASSESSMENT/Changes in Function: Pt demonstrates improved ankle mobility and strength, but is limited by discomfort with WB activity, primarily in the plantar aspect of her foot. Patient will continue to benefit from skilled PT services to modify and progress therapeutic interventions, address functional mobility deficits, address ROM deficits, address strength deficits, analyze and address soft tissue restrictions, analyze and cue movement patterns and analyze and modify body mechanics/ergonomics to attain remaining goals. []  See Plan of Care  []  See progress note/recertification  []  See Discharge Summary         Progress towards goals / Updated goals:  Short Term Goals: To be accomplished in 2 weeks:                         1. Patient will report performance of HEP at least 2 times per day to facilitate improved outcomes and improved self management. Pt reports compliance 7/25/18                        2. Pt will demonstrate R ankle PROM to neutral DF, PF of 50 or better to improve ease of WB activity and ambulation. Near met 4 degrees DF, 44 PF 8/7/2018                        3.Pt will demonstrate ability to ambulate with FWB in CAM boot void of axillary crutches to facilitate progression towards normalized ambulation and functional WB activity. Progressing, ambulating void of boot with 1 axillary crutch 8/7/2018      Long Term Goals: To be accomplished in 6 weeks:                         1. Patient will report FOTO score of 61 or better to indicate significant improvement in functional status.                        2. Pt will demonstrate 10 degrees R ankle DF PROM to facilitate normal gait mechanics. Progressing, 4 degrees 8/7/2018                         3. Pt will demonstrate Ability to ambulate for 300 feet with normal gait mechanics to improve ease of community ambulation. Progressing with ambulation void of AD, but remains limited with impaired gait mechanics 8/9/2018                         4. Pt will demonstrate 4/5 R ankle strength in all planes to improve stability during ambulation. PLAN  []  Upgrade activities as tolerated     [x]  Continue plan of care  []  Update interventions per flow sheet       []  Discharge due to:_  []  Other:_    Marsha Etienne, PT, DPT, ATC 8/14/2018  3:12 PM     Future Appointments  Date Time Provider Archana Carson   8/16/2018 3:00 PM [address] [facility]   8/21/2018 3:00 PM [address] [facility]   8/23/2018 3:00 PM Aryan Heltno PT [facility]   8/28/2018 3:00 PM Aryan Helton PT [facility]   8/30/2018 3:00 PM Aryan Helton, PT [facility]   9/4/2018 1:00 PM DENA Strickland 69

## 2018-08-16 ENCOUNTER — HOSPITAL ENCOUNTER (OUTPATIENT)
Dept: PHYSICAL THERAPY | Age: 38
Discharge: HOME | End: 2018-08-16
Payer: OTHER GOVERNMENT

## 2018-08-16 PROCEDURE — 97112 NEUROMUSCULAR REEDUCATION: CPT

## 2018-08-16 PROCEDURE — 97110 THERAPEUTIC EXERCISES: CPT

## 2018-08-16 PROCEDURE — 97140 MANUAL THERAPY 1/> REGIONS: CPT

## 2018-08-16 NOTE — PROGRESS NOTES
PT DAILY TREATMENT NOTE     Patient Name: Maryuri Part  Date:2018  : 1980  [x]  Patient  Verified  Payor: Nico Schmitt / Plan: Jass Irene / Product Type: Federal Funded Programs /    In time:3:01pm  Out time:3:46pm  Total Treatment Time (min): 45  Visit #: 8 of 12    Treatment Area: Peroneal tendinitis, right leg [M76.71]  Other specified postprocedural states [Z98.890]    SUBJECTIVE  Pain Level (0-10 scale): 2  Any medication changes, allergies to medications, adverse drug reactions, diagnosis change, or new procedure performed?: [x] No    [] Yes (see summary sheet for update)  Subjective functional status/changes:   [] No changes reported  Pt reports her ankle is sore because she has been on her feet a lot. OBJECTIVE  27 min Therapeutic Exercise:  [x] See flow sheet :   Rationale: increase ROM and increase strength to improve the patients ability to improve ADL ease. 10 min Neuromuscular Re-education:  [x]  See flow sheet :   Rationale: increase strength, improve coordination, improve balance and increase proprioception  to improve the patients ability to improve ease of ambulation. 8 min Manual Therapy:  Plantar fascia STM on R, R ankle DF mobilizations grade 2-4, 1st MTP ext mobs, manual gastroc/soleus stretch   Rationale: decrease pain, increase ROM and increase tissue extensibility to improve ease of ambulation.           With   [] TE   [] TA   [] neuro   [] other: Patient Education: [x] Review HEP    [] Progressed/Changed HEP based on:   [] positioning   [] body mechanics   [] transfers   [] heat/ice application    [] other:      Other Objective/Functional Measures: antalgic gait pattern, decreased terminal DF, improves somewhat with verbal cueing     Pain Level (0-10 scale) post treatment: 2-3    ASSESSMENT/Changes in Function: Pt is gradually improving, but remains limited with WB activity tolerance and limited functional ankle and first ray DF limiting ease of ambulation. Patient will continue to benefit from skilled PT services to modify and progress therapeutic interventions, address functional mobility deficits, address ROM deficits, address strength deficits, analyze and address soft tissue restrictions, analyze and cue movement patterns and analyze and modify body mechanics/ergonomics to attain remaining goals. []  See Plan of Care  []  See progress note/recertification  []  See Discharge Summary         Progress towards goals / Updated goals:  Short Term Goals: To be accomplished in 2 weeks:                         0. Patient will report performance of HEP at least 2 times per day to facilitate improved outcomes and improved self management. Pt reports compliance 7/25/18                        2. Pt will demonstrate R ankle PROM to neutral DF, PF of 50 or better to improve ease of WB activity and ambulation. Near met 4 degrees DF, 44 PF 8/7/2018                        3.Pt will demonstrate ability to ambulate with FWB in CAM boot void of axillary crutches to facilitate progression towards normalized ambulation and functional WB activity. Progressing, ambulating void of boot with 1 axillary crutch 8/7/2018      Long Term Goals: To be accomplished in 6 weeks:                         1. Patient will report FOTO score of 61 or better to indicate significant improvement in functional status.                        2. Pt will demonstrate 10 degrees R ankle DF PROM to facilitate normal gait mechanics. Progressing, 4 degrees 8/7/2018                         3. Pt will demonstrate Ability to ambulate for 300 feet with normal gait mechanics to improve ease of community ambulation. Progressing with ambulation void of AD, but remains limited with impaired gait mechanics 8/9/2018                         4. Pt will demonstrate 4/5 R ankle strength in all planes to improve stability during ambulation.     PLAN  []  Upgrade activities as tolerated     [x]  Continue plan of care  []  Update interventions per flow sheet       []  Discharge due to:_  []  Other:_      Toña Carson, PT, DPT, ATC 8/16/2018  4:10 PM    Future Appointments  Date Time Provider Archana Carson   8/21/2018 3:00 PM Porter Bettencourt, PT [facility]   8/23/2018 3:00 PM Ulysses Martinez, PT [facility]   8/28/2018 3:00 PM Ulysses Martinez, PT [facility]   8/30/2018 3:00 PM Ulysses Martinez, PT [facility]   9/4/2018 1:00 PM DENA Meza Fredis 69

## 2018-08-21 ENCOUNTER — HOSPITAL ENCOUNTER (OUTPATIENT)
Dept: PHYSICAL THERAPY | Age: 38
End: 2018-08-21
Payer: OTHER GOVERNMENT

## 2018-08-23 ENCOUNTER — HOSPITAL ENCOUNTER (OUTPATIENT)
Dept: PHYSICAL THERAPY | Age: 38
Discharge: HOME | End: 2018-08-23
Payer: OTHER GOVERNMENT

## 2018-08-23 PROCEDURE — 97112 NEUROMUSCULAR REEDUCATION: CPT

## 2018-08-23 PROCEDURE — 97140 MANUAL THERAPY 1/> REGIONS: CPT

## 2018-08-23 PROCEDURE — 97110 THERAPEUTIC EXERCISES: CPT

## 2018-08-23 NOTE — PROGRESS NOTES
PT DAILY TREATMENT NOTE     Patient Name: Layo Campos  Date:2018  : 1980  [x]  Patient  Verified  Payor: Nikunj Tracy / Plan: R[facility] / Product Type: Federal Funded Programs /    In time:2:59  Out time:3:41  Total Treatment Time (min): 42  Visit #: 9 of 12    Treatment Area: Peroneal tendinitis, right leg [M76.71]  Other specified postprocedural states [Z98.890]    SUBJECTIVE  Pain Level (0-10 scale): 2/10  Any medication changes, allergies to medications, adverse drug reactions, diagnosis change, or new procedure performed?: [x] No    [] Yes (see summary sheet for update)  Subjective functional status/changes:   [] No changes reported  The patient indicates that she has no new complaints to report upon arrival.     OBJECTIVE  24 min Therapeutic Exercise:  [x] See flow sheet :   Rationale: increase ROM and increase strength to improve the patients ability to improve ADL ease. 10 min Neuromuscular Re-education:  []  See flow sheet :   Rationale: increase ROM and increase strength  to improve the patients ability to improve ADL ease. 8 min Manual Therapy:  Prone EV/IV as well as DF mobs with gastroc stretching. Rationale: decrease pain, increase ROM and increase tissue extensibility to improve ADL ease. With   [] TE   [] TA   [] neuro   [] other: Patient Education: [x] Review HEP    [] Progressed/Changed HEP based on:   [] positioning   [] body mechanics   [] transfers   [] heat/ice application    [] other:      Other Objective/Functional Measures:  ROM: DF: 5 degrees    Strength:    DF: 4/5 MMT  EV: 4/5 MMT  IV:  4/5 MMT    Pain Level (0-10 scale) post treatment: 2/10    ASSESSMENT/Changes in Function: The patient is normalizing with respect to her gait pattern with slight compensations upon terminal stance of her right LE. She will continue to benefit from PT for an additional 2-3 weeks in order to progress strengthening and dynamic balance.     Patient will continue to benefit from skilled PT services to modify and progress therapeutic interventions, address functional mobility deficits, address ROM deficits, address strength deficits, analyze and address soft tissue restrictions, analyze and cue movement patterns, analyze and modify body mechanics/ergonomics, assess and modify postural abnormalities and instruct in home and community integration to attain remaining goals. []  See Plan of Care  []  See progress note/recertification  []  See Discharge Summary         Progress towards goals / Updated goals:  Short Term Goals: To be accomplished in 2 weeks:                         8. Patient will report performance of HEP at least 2 times per day to facilitate improved outcomes and improved self management. Pt reports compliance 7/25/18                        2. Pt will demonstrate R ankle PROM to neutral DF, PF of 50 or better to improve ease of WB activity and ambulation. Near met 4 degrees DF, 44 PF 8/7/2018: Progressed to 6 degrees DF; > 50 degrees PF 8/23/2018                        3.Pt will demonstrate ability to ambulate with FWB in CAM boot void of axillary crutches to facilitate progression towards normalized ambulation and functional WB activity. Progressing, ambulating void of boot with 1 axillary crutch 8/7/2018; Met ambulate in air cast slight terminal stance compensation 8/23/2018      Long Term Goals: To be accomplished in 6 weeks:                         1. Patient will report FOTO score of 61 or better to indicate significant improvement in functional status. Not met 34 8/14/2018                         2. Pt will demonstrate 10 degrees R ankle DF PROM to facilitate normal gait mechanics. Progressing, 4 degrees 8/7/2018; Progressed to 6 degrees 8/23/2018                         3. Pt will demonstrate Ability to ambulate for 300 feet with normal gait mechanics to improve ease of community ambulation.  Progressing with ambulation void of AD, but remains limited with impaired gait mechanics 8/9/2018; Able to ambulate 300' void of AD< but noted slight compensation terminal stance phase in air cast 8/23/2018                         4. Pt will demonstrate 4/5 R ankle strength in all planes to improve stability during ambulation.  Met 4/5 MMT 8/23/2018    PLAN  []  Upgrade activities as tolerated     [x]  Continue plan of care  []  Update interventions per flow sheet       []  Discharge due to:_  []  Other:_      Jareth Eugene, PT 8/23/2018  3:03 PM    Future Appointments  Date Time Provider Archana Carson   8/28/2018 3:00 PM Jareth Eugene PT [facility]   8/30/2018 3:00 [facility]   9/4/2018 1:00 PM Todd Brooks PA-C [facility]   9/7/2018 11:30 AM Venkat Rangel, PT [facility]

## 2018-08-23 NOTE — PROGRESS NOTES
[facility]  [address]  [place], [address]  [phone] fax    Physical Therapy Progress Note  Patient name: Hector Gomes Start of Care: 2018   Referral source: Gaye Ronquillo MD : 1980                          Medical Diagnosis: Peroneal tendinitis, right leg [M76.71]  Other specified postprocedural states [Z98.890] Onset Date:2018, DOS 2018   Treatment Diagnosis: s/p R peroneus brevis repair   Prior Hospitalization: see medical history Provider#: [id number]   Medications: Verified on Patient summary List   Comorbidities: unremarkable per pt report  Prior Level of Function: active as a , void of limitations with ambulation  Visits from Start of Care: 9    Missed Visits: 0    Key Functional Changes: The patient is normalizing with respect to her gait pattern with slight compensations upon terminal stance of her right LE. She will continue to benefit from PT for an additional 2-3 weeks in order to progress strengthening and dynamic balance. Right ankle ROM: DF: 5 degrees     Right ankle Strength:    DF: 4/5 MMT  EV: 4/5 MMT  IV:  4/5 MMT    Short Term Goals: To be accomplished in 2 weeks:                         1. Patient will report performance of HEP at least 2 times per day to facilitate improved outcomes and improved self management. Pt reports compliance 18                        2. Pt will demonstrate R ankle PROM to neutral DF, PF of 50 or better to improve ease of WB activity and ambulation. Near met 4 degrees DF, 44 PF 2018: Progressed to 6 degrees DF; > 50 degrees PF 2018                        3.Pt will demonstrate ability to ambulate with FWB in CAM boot void of axillary crutches to facilitate progression towards normalized ambulation and functional WB activity.  Progressing, ambulating void of boot with 1 axillary crutch 2018; Met ambulate in air cast slight terminal stance compensation 8/23/2018      Long Term Goals: To be accomplished in 6 weeks:                         1. Patient will report FOTO score of 61 or better to indicate significant improvement in functional status. Not met 34 8/14/2018                         2. Pt will demonstrate 10 degrees R ankle DF PROM to facilitate normal gait mechanics. Progressing, 4 degrees 8/7/2018; Progressed to 6 degrees 8/23/2018                         3. Pt will demonstrate Ability to ambulate for 300 feet with normal gait mechanics to improve ease of community ambulation. Progressing with ambulation void of AD, but remains limited with impaired gait mechanics 8/9/2018; Able to ambulate 300' void of AD< but noted slight compensation terminal stance phase in air cast 8/23/2018    Updated Goals: to be achieved in 2-3 weeks:   1. The patient will improve ambulation void of compensations to maximize ADL ease. 2. The patient will improve FOTO score to 61 to maximize quality of life. 3. The patient will improve DF to 10 degrees to maximize ease of ambulation.     ASSESSMENT/RECOMMENDATIONS:  [x]Continue therapy per initial plan/protocol at a frequency of  2 x per week for 2-3 weeks  []Continue therapy with the following recommended changes:_____________________      _____________________________________________________________________  []Discontinue therapy progressing towards or have reached established goals  []Discontinue therapy due to lack of appreciable progress towards goals  []Discontinue therapy due to lack of attendance or compliance  []Await Physician's recommendations/decisions regarding therapy  []Other:________________________________________________________________    Thank you for this referral.    Mela Dumas, PT 8/23/2018 3:51 PM  NOTE TO PHYSICIAN:  PLEASE COMPLETE THE ORDERS BELOW AND   FAX TO [facility]: [phone]  If you are unable to process this request in 24 hours please contact our office: [phone] I have read the above report and request that my patient continue as recommended. ? I have read the above report and request that my patient continue therapy with the following changes/special instructions:__________________________________________________________  ? I have read the above report and request that my patient be discharged from therapy.     Physicians signature: ______________________________Date: ______Time:______

## 2018-08-27 ENCOUNTER — TELEPHONE (OUTPATIENT)
Dept: ORTHOPEDIC SURGERY | Age: 38
End: 2018-08-27

## 2018-08-27 NOTE — LETTER
NOTIFICATION RETURN TO WORK / SCHOOL 
 
8/28/2018 8:14 AM 
 
Ms. Milli Jameson [address] [id number] To Whom It May Concern: 
 
Milli Jameson is currently under the care of [address] [place] [address] Elvis Veronica. She will remain out of work for an additional 2 weeks. If there are questions or concerns please have the patient contact our office. Sincerely, 
 
 
Amelia William.  DENA Wood

## 2018-08-27 NOTE — TELEPHONE ENCOUNTER
Patient called to request an updated letter for employer. Letter on file expires today, but follow up appt is not until 9/4. Please update and advise patient when ready at [phone] - she will retrieve letter through \"my chart\".

## 2018-08-28 ENCOUNTER — APPOINTMENT (OUTPATIENT)
Dept: PHYSICAL THERAPY | Age: 38
End: 2018-08-28
Payer: OTHER GOVERNMENT

## 2018-08-28 NOTE — TELEPHONE ENCOUNTER
Attempted to contact patient to advise her a new work note has been written for her. Unable to leave a message. Please advise patient of this information if/when she calls back.

## 2018-08-30 ENCOUNTER — HOSPITAL ENCOUNTER (OUTPATIENT)
Dept: PHYSICAL THERAPY | Age: 38
Discharge: HOME | End: 2018-08-30
Payer: OTHER GOVERNMENT

## 2018-08-30 PROCEDURE — 97112 NEUROMUSCULAR REEDUCATION: CPT

## 2018-08-30 PROCEDURE — 97110 THERAPEUTIC EXERCISES: CPT

## 2018-08-30 NOTE — PROGRESS NOTES
PT DAILY TREATMENT NOTE  Patient Name: Halie Ayala Date:2018 : 1980 [x]  Patient  Verified Payor: Lori Angelucci / Plan: Pat Mott / Product Type: Federal Funded Programs / In time:3:00  Out time:3:35 Total Treatment Time (min): 35 Visit #: 1 of 4-6 Treatment Area: Peroneal tendinitis, right leg [M76.71] Other specified postprocedural states [Z98.890] SUBJECTIVE Pain Level (0-10 scale): 4 Any medication changes, allergies to medications, adverse drug reactions, diagnosis change, or new procedure performed?: [x] No    [] Yes (see summary sheet for update) Subjective functional status/changes:   [] No changes reported Pt reports she has been on her feet most of the day and is a little sore because of that OBJECTIVE Modality rationale: PD to improve the patients ability to Min Type Additional Details  
 [] Estim:  []Unatt       []IFC  []Premod []Other:  []w/ice   []w/heat Position: Location:  
 [] Estim: []Att    []TENS instruct  []NMES []Other:  []w/US   []w/ice   []w/heat Position: Location:  
 []  Traction: [] Cervical       []Lumbar 
                     [] Prone          []Supine []Intermittent   []Continuous Lbs: 
[] before manual 
[] after manual  
 []  Ultrasound: []Continuous   [] Pulsed []1MHz   []3MHz W/cm2: 
Location:  
 []  Iontophoresis with dexamethasone Location: [] Take home patch  
[] In clinic  
 []  Ice     []  heat 
[]  Ice massage 
[]  Laser  
[]  Anodyne Position: Location:  
 []  Laser with stim 
[]  Other:  Position: Location:  
 []  Vasopneumatic Device Pressure:       [] lo [] med [] hi  
Temperature: [] lo [] med [] hi  
[] Skin assessment post-treatment:  []intact []redness- no adverse reaction 
  []redness  adverse reaction:  
 
 
27 min Therapeutic Exercise:  [] See flow sheet :  
 Rationale: increase ROM and increase strength to improve the patients ability to perform daily tasks and ADLs 10 min Neuromuscular Re-education:  []  See flow sheet :  
Rationale: improve coordination, improve balance and increase proprioception  to improve the patients ability to perform daily tasks with improved ankle mechanics and stability With 
 [] TE 
 [] TA 
 [] neuro 
 [] other: Patient Education: [x] Review HEP [] Progressed/Changed HEP based on:  
[] positioning   [] body mechanics   [] transfers   [] heat/ice application   
[] other:   
 
Other Objective/Functional Measures:  
 
Pain Level (0-10 scale) post treatment: 3-4 ASSESSMENT/Changes in Function: Pt with a little increased soreness during interventions today reporting increased standing activity today. Pt still hesitant with increased [address] through right foot, progress as tolerated Patient will continue to benefit from skilled PT services to modify and progress therapeutic interventions, address functional mobility deficits, address ROM deficits, address strength deficits, analyze and address soft tissue restrictions, analyze and cue movement patterns and analyze and modify body mechanics/ergonomics to attain remaining goals. []  See Plan of Care 
[]  See progress note/recertification 
[]  See Discharge Summary Progress towards goals / Updated goals: 
Updated Goals: to be achieved in 2-3 weeks: 1. The patient will improve ambulation void of compensations to maximize ADL ease. 2. The patient will improve FOTO score to 61 to maximize quality of life. 3. The patient will improve DF to 10 degrees to maximize ease of ambulation. PLAN [x]  Upgrade activities as tolerated     []  Continue plan of care 
[]  Update interventions per flow sheet      
[]  Discharge due to:_ 
[]  Other:_ Liseth Plasencia DPT, [employer] 8/30/2018  3:01 PM 
 
 Future Appointments Date Time Provider Archana Carson 9/4/2018 1:00 PM Tameka Parker PA-C [facility] SCHED  
9/5/2018 10:30 AM Kaylen Call, PT [facility]  
9/7/2018 11:30 AM [facility]

## 2018-09-04 ENCOUNTER — OFFICE VISIT (OUTPATIENT)
Dept: ORTHOPEDIC SURGERY | Age: 38
End: 2018-09-04

## 2018-09-04 VITALS
DIASTOLIC BLOOD PRESSURE: 82 MMHG | HEART RATE: 70 BPM | TEMPERATURE: 98.2 F | BODY MASS INDEX: 34.51 KG/M2 | SYSTOLIC BLOOD PRESSURE: 128 MMHG | HEIGHT: 69 IN | OXYGEN SATURATION: 98 % | WEIGHT: 233 LBS

## 2018-09-04 DIAGNOSIS — M76.71 TENDINITIS OF RIGHT PERONEUS BREVIS TENDON: Primary | ICD-10-CM

## 2018-09-04 DIAGNOSIS — S86.311D TEAR OF PERONEAL TENDON, RIGHT, SUBSEQUENT ENCOUNTER: ICD-10-CM

## 2018-09-04 RX ORDER — NAPROXEN 500 MG/1
500 TABLET ORAL
Qty: 60 TAB | Refills: 1 | Status: SHIPPED | OUTPATIENT
Start: 2018-09-04

## 2018-09-04 NOTE — PATIENT INSTRUCTIONS
· You may weight bear as tolerated using [facility] brace in supportive shoe/sneaker     · Continue formal PT    · Tubigrip provided for swelling    · Rx provided for Naprosyn, take as directed. Or you may use Biofreeze as directed    · Disconintue [facility] brace to ankle sleeve as needed    · No lifting, twisting, squatting, deep bending, driving or strenuous activity.     · Perform ankle pumps with non-surgical/non-injured extremity to help reduce the risk of blood clots    · Work note provided to continue out of work status until follow up    · Please follow up in 4 weeks

## 2018-09-04 NOTE — PROGRESS NOTES
Patient: Lia Perez                MRN: [id number]       SSN: [id number]  YOB: 1980           AGE: 45 y.o. SEX: female    José Brand MD    POST OP OFFICE NOTE  DOS: 6/1/18    Chief Complaint:   Chief Complaint   Patient presents with    Foot Pain     Right       HPI:     The patient is a 45 y.o. female who presents today for follow up 95 days s/p:     1. RIGHT PERONEAL BREVIS AND LONGUS TENDON TENOSYNOVECTOMIES   2. PRIMARY REPAIR OF PERONEAL BREVIS TENDON  3. EXCISION OF BUTTON HOLE CENTRAL PERONEAL TENDON TEAR      Patient has been WB to the right lower extremity. Patient reports doing well. Patient denies any fever, chest pain, shortness of breath or calf pain. There are no new illness or injuries to report since last seen in the office. Patient continues to improve in PT for range of motion with progression in strengthening and coordination. Patient states that she continues to have some pain and swelling to right ankle mostly when she has been on it for extended periods. She states that her ankle feels better without the [facility] brace. She has been applying scar cream to incision. PHYSICAL EXAM:     Visit Vitals    /82 (BP 1 Location: Left arm, BP Patient Position: Sitting)    Pulse 70    Temp 98.2 °F (36.8 °C) (Oral)    Ht 5' 9\" (1.753 m)    Wt 233 lb (105.7 kg)    SpO2 98%    BMI 34.41 kg/m2       GEN:  Alert, well developed, well nourished, well appearing 45 y.o. female in no acute distress. PSYCH:  Normal affect, mood, and conduct. alert, oriented x 3 alert, oriented x 3, no dementia  M/S EXAMINATION OF: right foot/ankle  INCISION: Incision looks good, skin healing well  SKIN: mild edema , no erythema, no  ecchymosis, no warmth    TENDERNESS:  mild tenderness to palpation   NEUROVASCULAR:  grossly intact. Positive distal pulses and capillary refill. DVT ASSESSMENT:  The calf is not tender to palpation.  No evidence of DVT seen on physical exam.  ROM: improving       RADIOGRAPHS & DIAGNOSTIC STUDIES     No results found for any visits on 09/04/18. IMPRESSION:     Encounter Diagnoses     ICD-10-CM ICD-9-CM   1. Tendinitis of right peroneus brevis tendon M76.71 727.06       PLAN:         No orders of the defined types were placed in this encounter.                  · You may weight bear as tolerated using ASAC brace in supportive shoe/sneaker     · Continue formal PT    · Tubigrip provided for swelling    · Rx provided for Naprosyn, take as directed. Or you may use Biofreeze as directed    · Disconintue ASAC brace to ankle sleeve as needed    · No lifting, twisting, squatting, deep bending, driving or strenuous activity. · Perform ankle pumps with non-surgical/non-injured extremity to help reduce the risk of blood clots    · Work note provided to continue out of work status until follow up    · Please follow up in 4 weeks            Patient has been discussed with Dr. Jasen Riley during this visit and he agrees with the assessment and plan    Patient expresses understanding of the plan. Patient education provided on post surgical care. REVIEW OF SYSTEMS:     Otherwise as noted in HPI      PAST MEDICAL HISTORY:     Past Medical History:   Diagnosis Date    Chronic pain     knees and ankles    GERD (gastroesophageal reflux disease)     during pregancy    Headache     Hemorrhoid     History of body piercing     Hypercholesterolemia     IUD (intrauterine device) in place     Migraine headache     SIFUENTES (nonalcoholic steatohepatitis) (per outside records) 6/11/2017    denies liver disease 5/22/2018    Tendinitis        MEDICATIONS:     Current Outpatient Prescriptions   Medication Sig    HYDROcodone-acetaminophen (NORCO) 7.5-325 mg per tablet TAKE ONE TO TWO TABLETS BY MOUTH Q 4 - 6 HRS PRN PAIN **AFTER SURGERY**  Indications: Pain    loratadine (CLARITIN) 10 mg tablet Take 10 mg by mouth daily as needed for Allergies.     triamcinolone (NASACORT) 55 mcg nasal inhaler 2 Sprays daily as needed.  multivitamin (ONE A DAY) tablet Take 1 Tab by mouth daily.  cholecalciferol, VITAMIN D3, (VITAMIN D3) 5,000 unit tab tablet Take  by mouth every Wednesday.  promethazine (PHENERGAN) 25 mg tablet Take 1 Tab by mouth every six (6) hours as needed for Nausea.  polyethylene glycol (MIRALAX) 17 gram packet Take 1 Packet by mouth daily. Mix in 8 oz prune juice    naproxen (NAPROSYN) 500 mg tablet Take 1 Tab by mouth two (2) times daily (with meals). (Patient taking differently: Take 500 mg by mouth two (2) times daily as needed.)     No current facility-administered medications for this visit. ALLERGIES:     Allergies   Allergen Reactions    Pcn [Penicillins] Hives, Swelling and Other (comments)     Throat closes    Shellfish Derived Hives, Swelling and Other (comments)     Throat closes         PAST SURGICAL HISTORY:     Past Surgical History:   Procedure Laterality Date    FOOT/TOES SURGERY PROC UNLISTED      HX OTHER SURGICAL      Tooth extraction (molars)       SOCIAL HISTORY:     Social History     Social History    Marital status: UNKNOWN     Spouse name: N/A    Number of children: N/A    Years of education: N/A     Occupational History    Not on file.      Social History Main Topics    Smoking status: Never Smoker    Smokeless tobacco: Never Used    Alcohol use Yes      Comment: rare    Drug use: No    Sexual activity: Not on file     Other Topics Concern    Not on file     Social History Narrative       FAMILY HISTORY:     Family History   Problem Relation Age of Onset    Diabetes Mother     Hypertension Mother     Bipolar Disorder Mother     Elevated Lipids Father     COPD Maternal Grandmother     Emphysema Maternal Grandmother     Lung Disease Maternal Grandmother     No Known Problems Maternal Grandfather     Stroke Paternal Grandmother     No Known Problems Paternal Grandfather     Allergic Rhinitis Daughter  Eczema Daughter     Headache Daughter            Toña Cortez  9/4/2018

## 2018-09-04 NOTE — LETTER
NOTIFICATION RETURN TO WORK / SCHOOL 
 
9/4/2018 1:30 PM 
 
Ms. Allen Ramirez [address] [address] To Whom It May Concern: 
 
Allen Ramirez is currently under the care of [address] [place] [address] Elvis Veronica. She will remain out of work until her follow up appointment in 4 weeks. If there are questions or concerns please have the patient contact our office. Sincerely, Wei Bauman PA-C

## 2018-09-04 NOTE — MR AVS SNAPSHOT
[place], [address] [facility] 
[phone] Patient: Milli Jameson MRN: [id number] XUB:[id number] Visit Information Date & Time Provider Department Dept. Phone Encounter #  
 9/4/2018  1:00  Bobby Merrill, [address] [facility]  Follow-up Instructions Return in about 4 weeks (around 10/2/2018) for follow up evaluation. Upcoming Health Maintenance Date Due DTaP/Tdap/Td series (1 - Tdap) 8/28/2001 PAP AKA CERVICAL CYTOLOGY 8/28/2001 Influenza Age 5 to Adult 8/1/2018 Allergies as of 9/4/2018  Review Complete On: 9/4/2018 By: Iwona Momin PA-C Severity Noted Reaction Type Reactions Pcn [Penicillins] High 05/12/2017    Hives, Swelling, Other (comments) Throat closes Shellfish Derived High 05/12/2017    Hives, Swelling, Other (comments) Throat closes Current Immunizations  Never Reviewed No immunizations on file. Not reviewed this visit You Were Diagnosed With   
  
 Codes Comments Tendinitis of right peroneus brevis tendon    -  Primary ICD-10-CM: M76.71 
ICD-9-CM: 727.06 Tear of peroneal tendon, right, subsequent encounter     ICD-10-CM: S86.311D ICD-9-CM: V58.89, 844.8 Vitals BP Pulse Temp Height(growth percentile) Weight(growth percentile) SpO2  
 128/82 (BP 1 Location: Left arm, BP Patient Position: Sitting) 70 98.2 °F (36.8 °C) (Oral) 5' 9\" (1.753 m) 233 lb (105.7 kg) 98% BMI OB Status Smoking Status 34.41 kg/m2 IUD Never Smoker BMI and BSA Data Body Mass Index Body Surface Area 34.41 kg/m 2 2.27 m 2 Preferred Pharmacy Pharmacy Name Phone [facility] [place],  [place]  [phone] Your Updated Medication List  
  
   
This list is accurate as of 9/4/18  1:35 PM.  Always use your most recent med list.  
  
  
  
 cholecalciferol (VITAMIN D3) 5,000 unit Tab tablet Commonly known as:  VITAMIN D3 Take  by mouth every Wednesday. CLARITIN 10 mg tablet Generic drug:  loratadine Take 10 mg by mouth daily as needed for Allergies. HYDROcodone-acetaminophen 7.5-325 mg per tablet Commonly known as:  NORCO  
TAKE ONE TO TWO TABLETS BY MOUTH Q 4 - 6 HRS PRN PAIN **AFTER SURGERY**  Indications: Pain  
  
 multivitamin tablet Commonly known as:  ONE A DAY Take 1 Tab by mouth daily. naproxen 500 mg tablet Commonly known as:  NAPROSYN Take 1 Tab by mouth two (2) times daily as needed. NASACORT 55 mcg nasal inhaler Generic drug:  triamcinolone 2 Sprays daily as needed. polyethylene glycol 17 gram packet Commonly known as:  Sheria Bud Take 1 Packet by mouth daily. Mix in 8 oz prune juice  
  
 promethazine 25 mg tablet Commonly known as:  PHENERGAN Take 1 Tab by mouth every six (6) hours as needed for Nausea. Prescriptions Printed Refills  
 naproxen (NAPROSYN) 500 mg tablet 1 Sig: Take 1 Tab by mouth two (2) times daily as needed. Class: Print Route: Oral  
  
We Performed the Following REFERRAL TO PHYSICAL THERAPY [VKY61 Custom] Comments:  
 Tendinitis of right peroneus brevis tendon  
 
eval and treat to include modalities 2-3 X a week X 3-4 weeks. Follow-up Instructions Return in about 4 weeks (around 10/2/2018) for follow up evaluation. To-Do List   
 09/05/2018 10:30 AM  
  Appointment with Yudith Randall PT at [facility]  [facility] ([phone])  
  
 09/07/2018 11:30 AM  
  Appointment with Melanie Fermin PT at [facility]  [facility] ([phone]) Referral Information Referral ID Referred By Referred To  
  
 [id number] Paddy KATHLEEN [address] [place]   
   [address] [place] [address] [address] [place], [place] [id number] [facility] Phone: [phone] Fax: [phone] Visits Status Start Date End Date 1 New Request 9/4/18 9/4/19 If your referral has a status of pending review or denied, additional information will be sent to support the outcome of this decision. Patient Instructions · You may weight bear as tolerated using ASAC brace in supportive shoe/sneaker · Continue formal PT 
 
· Tubigrip provided for swelling · Rx provided for Naprosyn, take as directed. Or you may use Biofreeze as directed · Disconintue ASAC brace to ankle sleeve as needed · No lifting, twisting, squatting, deep bending, driving or strenuous activity. · Perform ankle pumps with non-surgical/non-injured extremity to help reduce the risk of blood clots · Work note provided to continue out of work status until follow up · Please follow up in 4 weeks 
  
  
 
 
 
  
Introducing [facility]! Dear Liliam Prior: 
Thank you for requesting a Kandu account. Our records indicate that you already have an active Kandu account. You can access your account anytime at [email or web address] Did you know that you can access your hospital and ER discharge instructions at any time in Kandu? You can also review all of your test results from your hospital stay or ER visit. Additional Information If you have questions, please visit the Frequently Asked Questions section of the Kandu website at [email or web address] Remember, Kandu is NOT to be used for urgent needs. For medical emergencies, dial 911. Now available from your iPhone and Android! Please provide this summary of care documentation to your next provider. Your primary care clinician is listed as Linda Leary. If you have any questions after today's visit, please call [phone].

## 2018-09-04 NOTE — PROGRESS NOTES
AMBULATORY PROGRESS NOTE      Patient: Jolly Beth             MRN: [id number]     SSN: [id number] Body mass index is 34.41 kg/(m^2). YOB: 1980     AGE: 45 y.o. EX: female    PCP: Faby Suarez MD    IMPRESSION/DIAGNOSIS AND TREATMENT PLAN     DIAGNOSES  No diagnosis found. No orders of the defined types were placed in this encounter. Jolly Beth understands her diagnoses and the proposed plan. As such, my overall impression is a 40-year-old female who describes having long-standing pain and discomfort to the lateral portion of her right hindfoot, chronic tendinitis. Her history is listed as below. She brought an MRI with her, as this was ordered by Santos Canada M.D., an orthopedic surgeon, who saw her and evaluated her for ankle pain. He ordered an MRI, which was done on May 8, 2018. I did review the MRI. This was done at [address] [place] [address]. It revealed:    No evidence of acute fracture or dislocation. There is subtle abnormality to the peroneal brevis tendon concerning for tendinosis, and/or a short segment, longitudinal tear to the peroneal brevis tendon. It showed a small tibial effusion and small subtalar joint effusion. The findings are suggestive of a chronic plantar fasciitis also but no acute component of plantar fasciitis. In reviewing her MRI with and examining her, she has tenderness along the posterolateral border of her fibula along the peroneal tendons and had some swelling in this region, although it is mild at this current time. She has been using an AirSport AirCast brace.   Because of this degenerative signal or abnormal signal, recommendation is for the following:    Exploration of the right peroneal brevis and longus tendons, tenosynovectomy of the peroneal brevis and longus tendons assessment of the peroneal brevis tendon and repair should there be an interstitial segment tear of the peroneal brevis tendon and excisional debridement, primary repair of the peroneal brevis tendon, possible tenodesis of the peroneal brevis and to the peroneal longus tendon. She understands this is what I recommend for her. Also, because she has decreased monofilament testing on her right foot, dorsal, plantar, and in the DPN nerve distribution, as well as along the toes and plantar portion of her right forefoot along the medial plantar nerve distribution, recommendation is for EMG nerve conduction studies of her bilateral lower extremities. We will also get some blood work as well on her. Examination is listed as below. Plan:    1) Pre-Op Lab Tests: CBC w/ diff, CMP, PT INR, EKG, CXR, UA w/ reflex, Urine Culture, UDS, ESR, CRP, CCP, BRIDGET. 2) Follow up with Rhiannon Zavala for scheduling. 3) EMG/NCS of the BLE. 4) DME Order: Right Short CAM walker boot. 5) Work Note. RTO - Surgical Scheduling. HPI AND EXAMINATION     Ajith Jiménez IS A 45 y.o. female who presents to my outpatient office complaining of foot pain. Patient arrives to the office using an air splint. She reports having chronic tendinitis that has been present for about 15 years. Additionally, she reports having instability in her ankles. On 4/7 she was evaluated in the ED after leaving work due to right ankle pain. Imaging was done, she brought MRI results to the office today. Recently, she went to a graduation this weekend and notes her ankles became swollen at the end of the day. Denies having any heart, lungs, or kidney issues. MR imaging findings have been reviewed with the patient that showed a possible tear of one of her peroneal tendons. She denies h/o fibromyalgia or other musculoskeletal disorders. Intermittently, she can experience numbness along her right foot. Right ANKLE and FOOT       Gait: slow  Tenderness:  Moderate tenderness along her noninsertional Achilles' tendon (8 cm from insertional point with ankle in full DF)    Moderate tenderness to peroneal tendons. Cutaneous: No rashes, skin patches, wounds, or abrasions to the lower legs           Warm and Normal color. No regions of expressible drainage. Medial Border of Tibia Region: absent           Skin color, texture, turgor normal. Normal.           Mild swelling to the PL fibula. Joint Motion: ROM Ankle:Normal , Hindfoot: (ST,TN,CC Normal}, Forefoot toes:Normal  Neurologic Exam: Neuro: Motor: normal 5/5 strength in all tested muscle groups             Sensory : diminished sensation on monofilament test.  Contractures: Gastrocnemius or Achilles Contractures absent  Joint / Tendon Stability: Not tested due to guarding. Alignment: Normal foot alignment and  Flexible  Vascular: Normal Pulses/ NL Capillary refill, No evidence of DVT seen on physical exam.   No calf swelling, no tenderness to calf muscles. Lymphatic:  No Evidence of Lymphedema. Left ANKLE and FOOT       Gait: normal  Tenderness: no TTP at this time. Cutaneous: No rashes, skin patches, wounds, or abrasions to the lower legs           Warm and Normal color. No regions of expressible drainage. Medial Border of Tibia Region: absent           Skin color, texture, turgor normal. Normal.  Joint Motion: ROM Ankle:Normal , Hindfoot: (ST,TN,CC Normal}, Forefoot toes:Normal  Neurologic Exam: Neuro: Motor: normal 5/5 strength in all tested muscle groups and Sensory : no sensory deficits noted. Contractures: Gastrocnemius or Achilles Contractures absent  Joint / Tendon Stability: Not tested due to guarding. Alignment:  Normal Foot Alignment and  Flexible  Vascular: Normal Pulses/ NL Capillary refill, No evidence of DVT seen on physical exam.   No calf swelling, no tenderness to calf muscles. Lymphatic:  No Evidence of Lymphedema.     CHART REVIEW     Past Medical History:   Diagnosis Date    Chronic pain     knees and ankles    GERD (gastroesophageal reflux disease)     during pregancy    Headache     Hemorrhoid     History of body piercing     Hypercholesterolemia     IUD (intrauterine device) in place     Migraine headache     SIFUENTES (nonalcoholic steatohepatitis) (per outside records) 6/11/2017    denies liver disease 5/22/2018    Tendinitis      Current Outpatient Prescriptions   Medication Sig    HYDROcodone-acetaminophen (NORCO) 7.5-325 mg per tablet TAKE ONE TO TWO TABLETS BY MOUTH Q 4 - 6 HRS PRN PAIN **AFTER SURGERY**  Indications: Pain    loratadine (CLARITIN) 10 mg tablet Take 10 mg by mouth daily as needed for Allergies.  triamcinolone (NASACORT) 55 mcg nasal inhaler 2 Sprays daily as needed.  multivitamin (ONE A DAY) tablet Take 1 Tab by mouth daily.  cholecalciferol, VITAMIN D3, (VITAMIN D3) 5,000 unit tab tablet Take  by mouth every Wednesday.  promethazine (PHENERGAN) 25 mg tablet Take 1 Tab by mouth every six (6) hours as needed for Nausea.  polyethylene glycol (MIRALAX) 17 gram packet Take 1 Packet by mouth daily. Mix in 8 oz prune juice    naproxen (NAPROSYN) 500 mg tablet Take 1 Tab by mouth two (2) times daily (with meals). (Patient taking differently: Take 500 mg by mouth two (2) times daily as needed.)     No current facility-administered medications for this visit. Allergies   Allergen Reactions    Pcn [Penicillins] Hives, Swelling and Other (comments)     Throat closes    Shellfish Derived Hives, Swelling and Other (comments)     Throat closes     Past Surgical History:   Procedure Laterality Date    FOOT/TOES SURGERY PROC UNLISTED      HX OTHER SURGICAL      Tooth extraction (molars)     Social History     Occupational History    Not on file.      Social History Main Topics    Smoking status: Never Smoker    Smokeless tobacco: Never Used    Alcohol use Yes      Comment: rare    Drug use: No    Sexual activity: Not on file     Family History   Problem Relation Age of Onset    Diabetes Mother     Hypertension Mother     Bipolar Disorder Mother     Elevated Lipids Father  COPD Maternal Grandmother     Emphysema Maternal Grandmother     Lung Disease Maternal Grandmother     No Known Problems Maternal Grandfather     Stroke Paternal Grandmother     No Known Problems Paternal Grandfather     Allergic Rhinitis Daughter     Eczema Daughter     Headache Daughter         REVIEW OF SYSTEMS : 9/4/2018  ALL BELOW ARE Negative except : SEE HPI     CONSTITUTIONAL: denies chills, fatigue, fever, weight change   PSYCH: denies anxiety, depression, irritability or mood swings   ENT: denies - headaches, hearing change, nasal congestion, oral lesions, or sore throat   HEM/ONC denies - bleeding problems, bruising, pallor or swollen lymph nodes   ENDO: denies hot flashes, polydipsia/polyuria or temperature intolerance   RESP: denies - cough, shortness of breath or wheezing   CV: denies - chest pain, edema or palpitations, GR  GI: denies - abdominal pain, change in bowel habits, constipation, diarrhea or nausea/vomiting   : denies - dysuria, hematuria, incontinence, pelvic pain   MSK: denies  - See HPI. NEURO: denies - confusion, headaches, seizures or weakness   DERM: denies - dry skin, hair changes, rash or skin lesion changes  VASCULAR: Peripheral Vascular: No calf pain, vascular vein tenderness to calf pain              No calf throbbing, posterior knee throbbing pain     DIAGNOSTIC IMAGING     No notes on file    Written by Angelia Ramos, as dictated by Olesya Vargas MD. IDr., Olesya Vargas MD, confirm that all documentation is accurate.

## 2018-09-17 ENCOUNTER — DOCUMENTATION ONLY (OUTPATIENT)
Dept: ORTHOPEDIC SURGERY | Facility: CLINIC | Age: 38
End: 2018-09-17

## 2018-09-18 ENCOUNTER — DOCUMENTATION ONLY (OUTPATIENT)
Dept: ORTHOPEDIC SURGERY | Age: 38
End: 2018-09-18

## 2018-09-18 NOTE — PROGRESS NOTES
[employer] Disability form completed, faxed to [phone]. Patient called to  at [facility].

## 2018-10-03 ENCOUNTER — DOCUMENTATION ONLY (OUTPATIENT)
Dept: ORTHOPEDIC SURGERY | Age: 38
End: 2018-10-03

## 2018-10-03 NOTE — PROGRESS NOTES
Per pt request faxed (3rd time) office notes oleksandr Blas report#[id number] and appt information to   Ravindra: to the attn of Lisa--fax# prov by pt f#[phone];confirmation rec'vd 8pgs @ 11:22am

## 2018-10-15 ENCOUNTER — DOCUMENTATION ONLY (OUTPATIENT)
Dept: ORTHOPEDIC SURGERY | Age: 38
End: 2018-10-15

## 2018-10-15 NOTE — PROGRESS NOTES
[facility] Medical Request Form was received via fax and put in the forms bin at [facility].

## 2018-10-17 ENCOUNTER — OFFICE VISIT (OUTPATIENT)
Dept: ORTHOPEDIC SURGERY | Age: 38
End: 2018-10-17

## 2018-10-17 VITALS
DIASTOLIC BLOOD PRESSURE: 74 MMHG | BODY MASS INDEX: 35.07 KG/M2 | SYSTOLIC BLOOD PRESSURE: 136 MMHG | WEIGHT: 236.8 LBS | RESPIRATION RATE: 16 BRPM | OXYGEN SATURATION: 100 % | HEART RATE: 69 BPM | HEIGHT: 69 IN | TEMPERATURE: 96.3 F

## 2018-10-17 DIAGNOSIS — S86.311D TEAR OF PERONEAL TENDON, RIGHT, SUBSEQUENT ENCOUNTER: ICD-10-CM

## 2018-10-17 DIAGNOSIS — Z98.890 POST-OPERATIVE STATE: ICD-10-CM

## 2018-10-17 DIAGNOSIS — M76.71 TENDINITIS OF RIGHT PERONEUS BREVIS TENDON: Primary | ICD-10-CM

## 2018-10-17 NOTE — PATIENT INSTRUCTIONS
· You may weight bear as tolerated in supportive shoe/sneaker     · Continue home exercised using Thera Band Provided in the office today    · Apply ice or use Tubigrip for swelling    · Continue taking Naprosyn, take as directed. · No lifting, twisting, squatting, deep bending, driving or strenuous activity. · Work note provided to continue out of work status until follow up in 4 weeks    · Please follow up in 4 weeks or sooner as needed             Ankle: Exercises  Your Care Instructions  Here are some examples of exercises for your ankle. Start each exercise slowly. Ease off the exercise if you start to have pain. Your doctor or physical therapist will tell you when you can start these exercises and which ones will work best for you. How to do the exercises  \"Alphabet\" exercise    1. Trace the alphabet with your toe. This helps your ankle move in all directions. Side-to-side knee swing exercise    1. Sit in a chair with your foot flat on the floor. 2. Slowly move your knee from side to side while keeping your foot pressed flat. 3. Continue this exercise for 2 to 3 minutes. Towel curl    1. While sitting, place your foot on a towel on the floor and scrunch the towel toward you with your toes. 2. Then use your toes to push the towel away from you. 3. Make this exercise more challenging by placing a weighted object, such as a soup can, on the other end of the towel. Towel stretch    1. Sit with your legs extended and knees straight. 2. Place a towel around your foot just under the toes. 3. Hold each end of the towel in each hand, with your hands above your knees. 4. Pull back with the towel so that your foot stretches toward you. 5. Hold the position for at least 15 to 30 seconds. 6. Repeat 2 to 4 times a session, up to 5 sessions a day. Ankle eversion exercise    1.  Start by sitting with your foot flat on the floor and pushing it outward against an immovable object such as the wall or heavy furniture. Hold for about 6 seconds, then relax. Repeat 8 to 12 times. 2. After you feel comfortable with this, try using rubber tubing looped around the outside of your feet for resistance. Push your foot out to the side against the tubing, and then count to 10 as you slowly bring your foot back to the middle. Repeat 8 to 12 times. Isometric opposition exercises    1. While sitting, put your feet together flat on the floor. 2. Press your injured foot inward against your other foot. Hold for about 6 seconds, and relax. Repeat 8 to 12 times. 3. Then place the heel of your other foot on top of the injured one. Push down with the top heel while trying to push up with your injured foot. Hold for about 6 seconds, and relax. Repeat 8 to 12 times. Follow-up care is a key part of your treatment and safety. Be sure to make and go to all appointments, and call your doctor if you are having problems. It's also a good idea to know your test results and keep a list of the medicines you take. Where can you learn more? Go to http://petar-regis.info/. Enter L931 in the search box to learn more about \"Ankle: Exercises. \"  Current as of: November 29, 2017  Content Version: 11.8  © 9876-9198 Healthwise, Incorporated. Care instructions adapted under license by [employer] (which disclaims liability or warranty for this information). If you have questions about a medical condition or this instruction, always ask your healthcare professional. [place] [address] any warranty or liability for your use of this information.

## 2018-10-17 NOTE — PROGRESS NOTES
Patient: Alex De León                MRN: [id number]       SSN: [id number]  YOB: 1980           AGE: 45 y.o. SEX: female    Mecca Calvo MD    POST OP OFFICE NOTE  DOS: 6/1/18    Chief Complaint:   Chief Complaint   Patient presents with    Foot Pain     right foot pain       HPI:     The patient is a 45 y.o. female who presents today for follow up 5 months s/p:     1. RIGHT PERONEAL BREVIS AND LONGUS TENDON TENOSYNOVECTOMIES   2. PRIMARY REPAIR OF PERONEAL BREVIS TENDON  3. EXCISION OF BUTTON HOLE CENTRAL PERONEAL TENDON TEAR      Patient has been WB to the right lower extremity. Patient reports doing okay. She still has pain and cannot fit comfortably into the required footwear (steel toe shoes) for her job. Patient denies any fever, chest pain, shortness of breath or calf pain. There are no new illness or injuries to report since last seen in the office. Patient continues home exercises working on range of motion and strengthening. She has been applying scar cream to incision and using ice alternating with heat. PHYSICAL EXAM:     Visit Vitals  /74   Pulse 69   Temp 96.3 °F (35.7 °C) (Oral)   Resp 16   Ht 5' 9\" (1.753 m)   Wt 236 lb 12.8 oz (107.4 kg)   SpO2 100%   BMI 34.97 kg/m²       GEN:  Alert, well developed, well nourished, well appearing 45 y.o. female in no acute distress. PSYCH:  Normal affect, mood, and conduct. alert, oriented x 3 alert, oriented x 3, no dementia  M/S EXAMINATION OF: right foot/ankle  INCISION: Incision looks good, skin healing well  SKIN: mild edema , no erythema, no  ecchymosis, no warmth, slight hypertrophy and hyperpigmentation along surgical incision  TENDERNESS:  mild tenderness to palpation along lateral ankle  NEUROVASCULAR:  grossly intact. Positive distal pulses and capillary refill. DVT ASSESSMENT:  The calf is not tender to palpation.  No evidence of DVT seen on physical exam.  ROM: improving with limitation noted RADIOGRAPHS & DIAGNOSTIC STUDIES     No results found for any visits on 10/17/18. IMPRESSION:     Encounter Diagnoses     ICD-10-CM ICD-9-CM   1. Tendinitis of right peroneus brevis tendon M76.71 727.06   2. Tear of peroneal tendon, right, subsequent encounter S86.311D V58.89     844.8   3. Post-operative state Z98.890 V45.89       PLAN:         No orders of the defined types were placed in this encounter.                  · You may weight bear as tolerated in supportive shoe/sneaker     · Continue home exercised using Thera Band Provided in the office today    · Apply ice or use Tubigrip for swelling    · Continue taking Naprosyn, take as directed. · No lifting, twisting, squatting, deep bending, driving or strenuous activity. · Work note provided to continue out of work status until follow up in 4 weeks    · Please follow up in 4 weeks or sooner as needed          Patient has been discussed with Dr. Barby Quinn during this visit and he agrees with the assessment and plan    Patient expresses understanding of the plan. Patient education provided on post surgical care. REVIEW OF SYSTEMS:     Otherwise as noted in HPI      PAST MEDICAL HISTORY:     Past Medical History:   Diagnosis Date    Chronic pain     knees and ankles    GERD (gastroesophageal reflux disease)     during pregancy    Headache     Hemorrhoid     History of body piercing     Hypercholesterolemia     IUD (intrauterine device) in place     Migraine headache     SIFUENTES (nonalcoholic steatohepatitis) (per outside records) 6/11/2017    denies liver disease 5/22/2018    Tendinitis        MEDICATIONS:     Current Outpatient Medications   Medication Sig    naproxen (NAPROSYN) 500 mg tablet Take 1 Tab by mouth two (2) times daily as needed.     HYDROcodone-acetaminophen (NORCO) 7.5-325 mg per tablet TAKE ONE TO TWO TABLETS BY MOUTH Q 4 - 6 HRS PRN PAIN **AFTER SURGERY**  Indications: Pain    promethazine (PHENERGAN) 25 mg tablet Take 1 Tab by mouth every six (6) hours as needed for Nausea.  polyethylene glycol (MIRALAX) 17 gram packet Take 1 Packet by mouth daily. Mix in 8 oz prune juice    loratadine (CLARITIN) 10 mg tablet Take 10 mg by mouth daily as needed for Allergies.  triamcinolone (NASACORT) 55 mcg nasal inhaler 2 Sprays daily as needed.  multivitamin (ONE A DAY) tablet Take 1 Tab by mouth daily.  cholecalciferol, VITAMIN D3, (VITAMIN D3) 5,000 unit tab tablet Take  by mouth every Wednesday. No current facility-administered medications for this visit.         ALLERGIES:     Allergies   Allergen Reactions    Pcn [Penicillins] Hives, Swelling and Other (comments)     Throat closes    Shellfish Derived Hives, Swelling and Other (comments)     Throat closes         PAST SURGICAL HISTORY:     Past Surgical History:   Procedure Laterality Date    FOOT/TOES SURGERY PROC UNLISTED      HX OTHER SURGICAL      Tooth extraction (molars)       SOCIAL HISTORY:     Social History     Socioeconomic History    Marital status: UNKNOWN     Spouse name: Not on file    Number of children: Not on file    Years of education: Not on file    Highest education level: Not on file   Social Needs    Financial resource strain: Not on file    Food insecurity - worry: Not on file    Food insecurity - inability: Not on file   [employer] needs - medical: Not on file   [employer] needs - non-medical: Not on file   Occupational History    Not on file   Tobacco Use    Smoking status: Never Smoker    Smokeless tobacco: Never Used   Substance and Sexual Activity    Alcohol use: Yes     Comment: rare    Drug use: No    Sexual activity: Not on file   Other Topics Concern    Not on file   Social History Narrative    Not on file       FAMILY HISTORY:     Family History   Problem Relation Age of Onset    Diabetes Mother     Hypertension Mother     Bipolar Disorder Mother     Elevated Lipids Father     COPD Maternal Grandmother  Emphysema Maternal Grandmother     Lung Disease Maternal Grandmother     No Known Problems Maternal Grandfather     Stroke Paternal Grandmother     No Known Problems Paternal Grandfather     Allergic Rhinitis Daughter     Eczema Daughter     Headache Daughter            Fred Sanchez  10/17/2018

## 2018-10-17 NOTE — LETTER
NOTIFICATION RETURN TO WORK / SCHOOL 
 
10/17/2018 3:17 PM 
 
Ms. Tami Land [facility] [address] [address] To Whom It May Concern: 
 
Tami Land is currently under the care of [address] [place] [address] Elvis Veronica. She will remain out of work until her follow up appointment in 4 weeks. If there are questions or concerns please have the patient contact our office. Sincerely, Giovanni iYng PA-C

## 2018-10-22 ENCOUNTER — DOCUMENTATION ONLY (OUTPATIENT)
Dept: ORTHOPEDIC SURGERY | Age: 38
End: 2018-10-22

## 2018-11-14 ENCOUNTER — OFFICE VISIT (OUTPATIENT)
Dept: ORTHOPEDIC SURGERY | Age: 38
End: 2018-11-14

## 2018-11-14 VITALS
OXYGEN SATURATION: 98 % | BODY MASS INDEX: 34.83 KG/M2 | TEMPERATURE: 96.4 F | HEIGHT: 69 IN | DIASTOLIC BLOOD PRESSURE: 69 MMHG | SYSTOLIC BLOOD PRESSURE: 117 MMHG | HEART RATE: 58 BPM | WEIGHT: 235.2 LBS | RESPIRATION RATE: 16 BRPM

## 2018-11-14 DIAGNOSIS — Z98.890 POST-OPERATIVE STATE: Primary | ICD-10-CM

## 2018-11-14 DIAGNOSIS — S86.311D TEAR OF PERONEAL TENDON, RIGHT, SUBSEQUENT ENCOUNTER: ICD-10-CM

## 2018-11-14 NOTE — PROGRESS NOTES
AMBULATORY PROGRESS NOTE Patient: Judi Jacob             MRN: [id number]     SSN: [id number] Body mass index is 34.73 kg/m². YOB: 1980     AGE: 45 y.o. EX: female PCP: Ruth Perez MD 
 
 IMPRESSION/DIAGNOSIS AND TREATMENT PLAN  
 
DIAGNOSES 1. Post-operative state 2. Tear of peroneal tendon, right, subsequent encounter No orders of the defined types were placed in this encounter. Judi Jacob understands her diagnoses and the proposed plan. Plan: 
 
1) Work Note: Please allow Ms. Bautista to remain out of work until January 7th, 2019. She is still under our care having had surgery on her peroneal tendons. 2) Continue HEP with Theraband. 3) Continue activity as tolerated. RTO - 3 months HPI AND EXAMINATION Judi Jacob IS A 45 y.o. female who presents to my outpatient office for follow up 6 months s/p:  
  
1. RIGHT PERONEAL [address] 2. PRIMARY REPAIR OF PERONEAL BREVIS TENDON 3. EXCISION OF BUTTON HOLE CENTRAL PERONEAL TENDON TEAR At the last visit, Bobby Thomason PA-C, instructed the patient to coming the HEP with Theraband, to remain WBAT to the surgical extremity, to continue taking Naprosyn, and to use cryotherapy as directed. Since we saw her last, Ms. Maegan Templeton states that she is doing well. She rates her pain today as a 2 out of 10. She describes this pain as a dull pain, with occasional sharp pains over the incisional site. However, she states this pain is improving. She presents in the office today wearing regular shoes. She expresses concern about whether she can continue to work at her current location, [employer], and has not returned to work yet. She states that she is going to try to find another place of employment. The patient works at [employer]. Visit Vitals /69 Pulse (!) 58 Temp 96.4 °F (35.8 °C) (Oral) Resp 16 Ht 5' 9\" (1.753 m) Wt 235 lb 3.2 oz (106.7 kg) SpO2 98% BMI 34.73 kg/m² GEN:  Alert, well developed, well nourished, well appearing 45 y.o. female in no acute distress. PSYCH:  Normal affect, mood, and conduct. alert, oriented x 3 alert, oriented x 3, no dementia M/S EXAMINATION OF: right foot/ankle INCISION: Incision looks good, skin healing well SKIN: mild edema , no erythema, no  ecchymosis, no warmth, well healed surgical scar with slight hypertrophy and hyperpigmentation along surgical incision TENDERNESS:  mild tenderness to palpation along lateral ankle NEUROVASCULAR:  grossly intact. Positive distal pulses and capillary refill. DVT ASSESSMENT:  The calf is not tender to palpation. No evidence of DVT seen on physical exam. 
ROM: improving with limitation noted Mild stiffness with inversion Full eversion 30 degrees of PF 
 DF to neutral 
 
CHART REVIEW Past Medical History:  
Diagnosis Date  Chronic pain   
 knees and ankles  GERD (gastroesophageal reflux disease) during pregancy  Headache  Hemorrhoid  History of body piercing  Hypercholesterolemia  IUD (intrauterine device) in place  Migraine headache  SIFUENTES (nonalcoholic steatohepatitis) (per outside records) 6/11/2017  
 denies liver disease 5/22/2018  Tendinitis Current Outpatient Medications Medication Sig  
 naproxen (NAPROSYN) 500 mg tablet Take 1 Tab by mouth two (2) times daily as needed.  HYDROcodone-acetaminophen (NORCO) 7.5-325 mg per tablet TAKE ONE TO TWO TABLETS BY MOUTH Q 4 - 6 HRS PRN PAIN **AFTER SURGERY**  Indications: Pain  promethazine (PHENERGAN) 25 mg tablet Take 1 Tab by mouth every six (6) hours as needed for Nausea.  polyethylene glycol (MIRALAX) 17 gram packet Take 1 Packet by mouth daily. Mix in 8 oz prune juice  loratadine (CLARITIN) 10 mg tablet Take 10 mg by mouth daily as needed for Allergies.  triamcinolone (NASACORT) 55 mcg nasal inhaler 2 Sprays daily as needed.  multivitamin (ONE A DAY) tablet Take 1 Tab by mouth daily.  cholecalciferol, VITAMIN D3, (VITAMIN D3) 5,000 unit tab tablet Take  by mouth every Wednesday. No current facility-administered medications for this visit. Allergies Allergen Reactions  Pcn [Penicillins] Hives, Swelling and Other (comments) Throat closes  Shellfish Derived Hives, Swelling and Other (comments) Throat closes Past Surgical History:  
Procedure Laterality Date  FOOT/TOES SURGERY PROC UNLISTED  HX OTHER SURGICAL Tooth extraction (molars) Social History Occupational History  Not on file Tobacco Use  Smoking status: Never Smoker  Smokeless tobacco: Never Used Substance and Sexual Activity  Alcohol use: Yes Comment: rare  Drug use: No  
 Sexual activity: Not on file Family History Problem Relation Age of Onset  Diabetes Mother  Hypertension Mother  Bipolar Disorder Mother  Elevated Lipids Father  COPD Maternal Grandmother  Emphysema Maternal Grandmother  Lung Disease Maternal Grandmother  No Known Problems Maternal Grandfather  Stroke Paternal Grandmother  No Known Problems Paternal Grandfather  Allergic Rhinitis Daughter  Eczema Daughter  Headache Daughter REVIEW OF SYSTEMS : 11/14/2018  ALL BELOW ARE Negative except : SEE HPI Constitutional: Negative for fever, chills and weight loss. Neg Weight Loss Cardiovascular: Negative for chest pain, claudication and leg swelling. SOB, GR Gastrointestinal/Urological: Negative for  pain, N/V/D/C, Blood in stool or urine,dysuria                         Hematuria, Incontinence, pelvic pain Musculoskeletal: see HPI. Neurological: Negative for dizziness and weakness, headaches,Visual Changes             Confusion,  Or Seizures, Psychiatric/Behavioral: Negative for depression, memory loss and substance abuse. Extremities:  Negative for hair changes, rash or skin lesion changes. Hematologic: Negative for Bleeding problems, bruising, pallor or swollen lymph nodes. Peripheral Vascular: No calf pain, vascular vein tenderness to calf pain No calf throbbing, posterior knee throbbing pain DIAGNOSTIC IMAGING No notes on file Please see above section of this report. I have personally reviewed the results of the above study. The interpretation of this study is my professional opinion. Written by Joe Bailey, as dictated by Dr. Carmina Jones. I, Dr. Carmina Jones, confirm that all documentation is accurate.

## 2018-11-14 NOTE — PATIENT INSTRUCTIONS
Please follow up in 3 months. You are advised to contact us if your condition worsens. Peroneal Tendon Strain: Rehab Exercises Your Care Instructions Here are some examples of typical rehabilitation exercises for your condition. Start each exercise slowly. Ease off the exercise if you start to have pain. Your doctor or physical therapist will tell you when you can start these exercises and which ones will work best for you. How to do the exercises Calf wall stretch (back knee straight) 1. Stand facing a wall with your hands on the wall at about eye level. Put your affected leg about a step behind your other leg. 2. Keeping your back leg straight and your back heel on the floor, bend your front knee and gently bring your hip and chest toward the wall until you feel a stretch in the calf of your back leg. 3. Hold the stretch for at least 15 to 30 seconds. 4. Repeat 2 to 4 times. Calf wall stretch (knees bent) 1. Stand facing a wall with your hands on the wall at about eye level. Put your affected leg about a step behind your other leg. 2. Keeping both heels on the floor, bend both knees. Then gently bring your hip and chest toward the wall until you feel a stretch in the calf of your back leg. 3. Hold the stretch for at least 15 to 30 seconds. 4. Repeat 2 to 4 times. Hamstring wall stretch 1. Lie on your back in a doorway, with your good leg through the open door. 2. Slide your affected leg up the wall to straighten your knee. You should feel a gentle stretch down the back of your leg. 1. Do not arch your back. 2. Do not bend either knee. 3. Keep one heel touching the floor and the other heel touching the wall. Do not point your toes. 3. Hold the stretch for at least 1 minute to begin. Then over time, try to lengthen the time you hold the stretch to as long as 6 minutes. 4. Repeat 2 to 4 times.  
5. If you do not have a place to do this exercise in a doorway, there is another way to do it: 6. Lie on your back, and bend the knee of your affected leg. 7. Loop a towel under the ball and toes of that foot, and hold the ends of the towel in your hands. 8. Straighten your knee, and slowly pull back on the towel. You should feel a gentle stretch down the back of your leg. 9. Hold the stretch for 15 to 30 seconds. Or even better, hold the stretch for 1 minute if you can. 10. Repeat 2 to 4 times. Shin muscle stretch 1. Sit in a chair, with both feet flat on the floor. 2. Bend your affected leg behind you so that the top of your foot near your toes is flat on the floor and your toes are pointed away from your body. If you need to, you can hold on to the sides of the chair for support. 3. Hold the stretch for at least 15 to 30 seconds. You should feel a stretch in the front (shin) of your lower leg. 4. Repeat 2 to 4 times. Follow-up care is a key part of your treatment and safety. Be sure to make and go to all appointments, and call your doctor if you are having problems. It's also a good idea to know your test results and keep a list of the medicines you take. Where can you learn more? Go to http://petar-regis.info/. Enter 0376 [id number] in the search box to learn more about \"Peroneal Tendon Strain: Rehab Exercises. \" Current as of: November 29, 2017 Content Version: 11.8 © 0882-5503 Healthwise, Incorporated. Care instructions adapted under license by [employer] (which disclaims liability or warranty for this information). If you have questions about a medical condition or this instruction, always ask your healthcare professional. [address] any warranty or liability for your use of this information.

## 2018-11-14 NOTE — LETTER
NOTIFICATION RETURN TO WORK / SCHOOL 
 
11/14/2018 8:16 AM 
 
Ms. Alex De León [address] [address] [place] [address] To Whom It May Concern: 
 
Alex De León is currently under the care of [address] [place] [address] Elvis Veronica. Please allow Ms. Bautista to remain out of work until January 7th, 2019. She is still under our care, having had surgery on her peroneal tendons. If there are questions or concerns please have the patient contact our office.  
 
 
 
Sincerely, 
 
 
Hollis Woods MD

## 2018-11-19 ENCOUNTER — DOCUMENTATION ONLY (OUTPATIENT)
Dept: ORTHOPEDIC SURGERY | Age: 38
End: 2018-11-19

## 2018-12-07 NOTE — PROGRESS NOTES
In [address]  [address] [place], [address] 
[phone] fax Physical Therapy Discharge Summary Patient name: Hector Gomes Start of Care: 2018 Referral source: Gaye Ronquillo MD : 1980 Medical/Treatment Diagnosis: Peroneal tendinitis, right leg [M76.71] Other specified postprocedural states [Z98.890] Payor: Sruthi Echeverria / Plan: Jasmine Brittle / Product Type: Federal Funded Programs /  Onset Date:2018, [place] 2018 Prior Hospitalization: see medical history Provider#: [id number] Medications: Verified on Patient Summary List   
Comorbidities: unremarkable per pt report Prior Level of Function: active as a , void of limitations with ambulation Visits from Start of Care: 10    Missed Visits: 3 Reporting Period : 2018 to 2018 Summary of Care: 
Progress towards goals / Updated goals: 
Updated Goals: to be achieved in 2-3 weeks: 
                      1. The patient will improve ambulation void of compensations to maximize ADL ease. 
                      2. The patient will improve FOTO score to 61 to maximize quality of life. 
                      3. The patient will improve DF to 10 degrees to maximize ease of ambulation. ASSESSMENT/RECOMMENDATIONS: Pt had been demonstrating improved LE strength and gait mechanics with some residual hesitancy to WB through the involved side, however, she has since stopped f/u in PT. Unable to further assess progress towards goals at this time due to non-compliance/lack of attendance. DC at this time with no further instructions to the patient. Thank you for this referral. 
 
[x]Discontinue therapy: []Patient has reached or is progressing toward set goals [x]Patient is non-compliant or has abdicated 
    []Due to lack of appreciable progress towards set goals Keily Ricks, PT, DPT, ATC 2018 4:50 PM

## 2018-12-18 ENCOUNTER — TELEPHONE (OUTPATIENT)
Dept: ORTHOPEDIC SURGERY | Age: 38
End: 2018-12-18

## 2018-12-18 NOTE — TELEPHONE ENCOUNTER
Nikki Lara from [facility] is calling ref the form which was reced from Dr. Meenakshi Campbell and they need clarification on how the no work restiction was determined. Nikki Lara faxed over a letter yesterday ref this request to [phone]. Please have nurse complete and fax back to [address].   Any questions and Nikki Lara can be reached at [phone]

## 2018-12-18 NOTE — TELEPHONE ENCOUNTER
Spoke with Andrew Landaverde, she is re faxing to . I let her know that I would call her as soon as it comes through.

## 2018-12-27 ENCOUNTER — TELEPHONE (OUTPATIENT)
Dept: ORTHOPEDIC SURGERY | Age: 38
End: 2018-12-27

## 2018-12-27 NOTE — TELEPHONE ENCOUNTER
Marcela Naranjo with [facility] [phone] checking status of letter that was faxed on 12/18/18

## 2019-02-13 ENCOUNTER — OFFICE VISIT (OUTPATIENT)
Dept: ORTHOPEDIC SURGERY | Age: 39
End: 2019-02-13

## 2019-02-13 VITALS
TEMPERATURE: 97.5 F | HEART RATE: 71 BPM | WEIGHT: 239.4 LBS | HEIGHT: 69 IN | OXYGEN SATURATION: 100 % | DIASTOLIC BLOOD PRESSURE: 73 MMHG | RESPIRATION RATE: 16 BRPM | BODY MASS INDEX: 35.46 KG/M2 | SYSTOLIC BLOOD PRESSURE: 117 MMHG

## 2019-02-13 DIAGNOSIS — M79.674 PAIN OF TOE OF RIGHT FOOT: ICD-10-CM

## 2019-02-13 DIAGNOSIS — S86.311D TEAR OF PERONEAL TENDON, RIGHT, SUBSEQUENT ENCOUNTER: Primary | ICD-10-CM

## 2019-02-13 DIAGNOSIS — M76.71 TENDINITIS OF RIGHT PERONEUS BREVIS TENDON: ICD-10-CM

## 2019-02-13 NOTE — PATIENT INSTRUCTIONS
Please follow up in June 2019. You are advised to contact us if your condition worsens. Toe Fracture: Exercises Complete at least 2 sessions of each exercise each day to get started (no days off). If beneficial and able to fit into your schedule, more sessions are recommended. Passive toe exercise 1. Sit on the floor, with the heel of your affected foot on the floor. Use one hand to hold your foot steady. 2. Using the thumb and index (pointing) finger of your other hand, slowly bend your toe forward and then backward. Hold each position for about 10 seconds. 3. Repeat 3 times. Active toe curl 1. Sit on the floor, with the heel of your affected foot on the floor. 2. Gently curl your toes forward and then backward. Hold each position for about 10 seconds. 3. Repeat 10 times. Towel scrunches 1. Sit in a chair, and place your affected foot on a towel on the floor. 2. Scrunch the towel toward you with your toes. Then use your toes to push the towel back into place.  
3. Repeat 10 times.

## 2019-02-13 NOTE — PROGRESS NOTES
AMBULATORY PROGRESS NOTE Patient: Jessi Mack             MRN: [id number]     SSN: [id number] Body mass index is 35.35 kg/m². YOB: 1980     AGE: 45 y.o. EX: female PCP: Danielle Santos MD 
 
 IMPRESSION/DIAGNOSIS AND TREATMENT PLAN  
 
DIAGNOSES 1. Tear of peroneal tendon, right, subsequent encounter 2. Tendinitis of right peroneus brevis tendon 3. Pain of toe of right foot Orders Placed This Encounter  [33879] Foot 2V Jessi Mack understands her diagnoses and the proposed plan. Plan: 
 
1) HEP - Armen Martinez, SARIKA, has educated the patient on peroneal tendon specific exercises and/or stretches. 2) Perform ROM exercises with the great toe. 3) Work Note: Ms. Nikolas Nails is not to perform duties that include prolonged standing, heavy lifting, or operating machinery with the right foot/ankle. 4) Continue activity modification as directed. RTO - 4 months, in June HPI AND EXAMINATION Jessi Mack IS A 45 y.o. female who presents to my outpatient office for follow up 8 months s/p:  
  
1. RIGHT PERONEAL [address] 2. PRIMARY REPAIR OF PERONEAL BREVIS TENDON 3. EXCISION OF BUTTON HOLE CENTRAL PERONEAL TENDON TEAR 
  
At the last visit, I provided a work note, instructed the patient to continue activity as tolerated, and to continue the HEP with Theraband. Since we saw her last, Ms. Nikolas Nails states that she believes her ankle is improving. She notes that if she stands for a prolonged period of time, her ankle will swell. Additionally, she finds certain shoes cause pain, as well. However, overall she believes she is doing better and finds that her ROM is improving. The patient reports that she returned to work in January. She states that she is in the process of finding a new job.   
 
Of note, the patient reports that she believes she may have fractured her right great toe sometime after her surgery. She recalls that she almost fell and put her right great toe down to stop her from falling. She states that now, she has limited mobility in her great toe. The patient works at [employer]. Visit Vitals /73 Pulse 71 Temp 97.5 °F (36.4 °C) (Oral) Resp 16 Ht 5' 9\" (1.753 m) Wt 239 lb 6.4 oz (108.6 kg) SpO2 100% BMI 35.35 kg/m² GEN:  Alert, well developed, well nourished, well appearing 45 y. o. female in no acute distress. PSYCH:  Normal affect, mood, and conduct. alert, oriented x 3 alert, oriented x 3, no dementia M/S EXAMINATION OF: right foot/ankle INCISION: well healed surgical scar with slight hypertrophy and hyperpigmentation along surgical incision SKIN: no edema , no erythema, no  ecchymosis, no warmth TENDERNESS:  mild tenderness to palpation along lateral ankle Mild tenderness to the great toe metatarsal 
NEUROVASCULAR:  grossly intact. Positive distal pulses and capillary refill. DVT ASSESSMENT:  The calf is not tender to palpation. No evidence of DVT seen on physical exam. 
ROM: improving with limitation noted Mild stiffness with inversion Full eversion 30 degrees of PF 
            DF to neutral 
 Great toe extends to juts about neutral 
 
CHART REVIEW Past Medical History:  
Diagnosis Date  Chronic pain   
 knees and ankles  GERD (gastroesophageal reflux disease) during pregancy  Headache  Hemorrhoid  History of body piercing  Hypercholesterolemia  IUD (intrauterine device) in place  Migraine headache  SIFUENTES (nonalcoholic steatohepatitis) (per outside records) 6/11/2017  
 denies liver disease 5/22/2018  Tendinitis Current Outpatient Medications Medication Sig  
 naproxen (NAPROSYN) 500 mg tablet Take 1 Tab by mouth two (2) times daily as needed.   
 loratadine (CLARITIN) 10 mg tablet Take 10 mg by mouth daily as needed for Allergies.  triamcinolone (NASACORT) 55 mcg nasal inhaler 2 Sprays daily as needed.  multivitamin (ONE A DAY) tablet Take 1 Tab by mouth daily.  cholecalciferol, VITAMIN D3, (VITAMIN D3) 5,000 unit tab tablet Take  by mouth every Wednesday.  HYDROcodone-acetaminophen (NORCO) 7.5-325 mg per tablet TAKE ONE TO TWO TABLETS BY MOUTH Q 4 - 6 HRS PRN PAIN **AFTER SURGERY**  Indications: Pain  promethazine (PHENERGAN) 25 mg tablet Take 1 Tab by mouth every six (6) hours as needed for Nausea.  polyethylene glycol (MIRALAX) 17 gram packet Take 1 Packet by mouth daily. Mix in 8 oz prune juice No current facility-administered medications for this visit. Allergies Allergen Reactions  Pcn [Penicillins] Hives, Swelling and Other (comments) Throat closes  Shellfish Derived Hives, Swelling and Other (comments) Throat closes Past Surgical History:  
Procedure Laterality Date  FOOT/TOES SURGERY PROC UNLISTED  HX OTHER SURGICAL Tooth extraction (molars) Social History Occupational History  Not on file Tobacco Use  Smoking status: Never Smoker  Smokeless tobacco: Never Used Substance and Sexual Activity  Alcohol use: Yes Comment: rare  Drug use: No  
 Sexual activity: Not on file Family History Problem Relation Age of Onset  Diabetes Mother  Hypertension Mother  Bipolar Disorder Mother  Elevated Lipids Father  COPD Maternal Grandmother  Emphysema Maternal Grandmother  Lung Disease Maternal Grandmother  No Known Problems Maternal Grandfather  Stroke Paternal Grandmother  No Known Problems Paternal Grandfather  Allergic Rhinitis Daughter  Eczema Daughter  Headache Daughter REVIEW OF SYSTEMS : 2/13/2019  ALL BELOW ARE Negative except : SEE HPI Constitutional: Negative for fever, chills and weight loss. Neg Weight Loss Cardiovascular: Negative for chest pain, claudication and leg swelling. SOB, GR Gastrointestinal/Urological: Negative for  pain, N/V/D/C, Blood in stool or urine,dysuria                         Hematuria, Incontinence, pelvic pain Musculoskeletal: see HPI. Neurological: Negative for dizziness and weakness, headaches,Visual Changes             Confusion,  Or Seizures, Psychiatric/Behavioral: Negative for depression, memory loss and substance abuse. Extremities:  Negative for hair changes, rash or skin lesion changes. Hematologic: Negative for Bleeding problems, bruising, pallor or swollen lymph nodes. Peripheral Vascular: No calf pain, vascular vein tenderness to calf pain No calf throbbing, posterior knee throbbing pain DIAGNOSTIC IMAGING No notes on file Please see above section of this report. I have personally reviewed the results of the above study. The interpretation of this study is my professional opinion. Written by Luna Coker, as dictated by Dr. Vinod Hernandez. I, Dr. Vinod Hernandez, confirm that all documentation is accurate.

## 2019-02-13 NOTE — LETTER
NOTIFICATION RETURN TO WORK / SCHOOL 
 
2/13/2019 10:03 AM 
 
Ms. Kasey Rose [address] [address] To Whom It May Concern: 
 
Kasey Rose is currently under the care of [address] [place] [address] Elvis Veronica. Ms. Selene Beckman is not to perform duties that include prolonged standing, heavy lifting, or operating machinery with the right foot/ankle. If there are questions or concerns please have the patient contact our office.  
 
 
 
Sincerely, 
 
 
Maria Esther Godwin MD

## 2019-03-28 ENCOUNTER — DOCUMENTATION ONLY (OUTPATIENT)
Dept: ORTHOPEDIC SURGERY | Age: 39
End: 2019-03-28

## 2019-03-28 NOTE — PROGRESS NOTES
Clinical staff rcvd a fax at [facility] forms from [place]. Patient will  paperwork when completed & or faxed at the [facility] location. Patient can be reached at [phone].

## 2019-04-03 ENCOUNTER — DOCUMENTATION ONLY (OUTPATIENT)
Dept: ORTHOPEDIC SURGERY | Age: 39
End: 2019-04-03

## 2019-04-03 NOTE — PROGRESS NOTES
[address]  Form/1-Page (6 pages total with letters attached with information) was faxed to [facility] location for completion. Form has been placed into forms bin. Please complete and fax document to [phone] and contact patient at [phone] to notify patient when completed.

## 2019-04-10 ENCOUNTER — DOCUMENTATION ONLY (OUTPATIENT)
Dept: ORTHOPEDIC SURGERY | Age: 39
End: 2019-04-10

## 2019-04-10 NOTE — PROGRESS NOTES
Patient gave the clinical staff at [facility] during their appt forms from [place] [place] . Patient will  paperwork when completed & or faxed at the [facility] location. Patient is aware of the 0-14 business day policy. Patient can be reached at [phone].

## 2019-04-12 ENCOUNTER — DOCUMENTATION ONLY (OUTPATIENT)
Dept: ORTHOPEDIC SURGERY | Age: 39
End: 2019-04-12

## 2019-04-12 NOTE — PROGRESS NOTES
Form completed and faxed to [place]. Copy placed in scanning and original form placed up front at [facility].  Left voicemail letting patient know this

## 2019-04-12 NOTE — PROGRESS NOTES
LMOVM informing patient that form was faxed and ready to be picked up at [facility] location.

## 2019-06-05 ENCOUNTER — OFFICE VISIT (OUTPATIENT)
Dept: ORTHOPEDIC SURGERY | Age: 39
End: 2019-06-05

## 2019-06-05 VITALS
WEIGHT: 245 LBS | RESPIRATION RATE: 16 BRPM | SYSTOLIC BLOOD PRESSURE: 115 MMHG | OXYGEN SATURATION: 98 % | TEMPERATURE: 98 F | HEIGHT: 69 IN | HEART RATE: 65 BPM | DIASTOLIC BLOOD PRESSURE: 53 MMHG | BODY MASS INDEX: 36.29 KG/M2

## 2019-06-05 DIAGNOSIS — S86.311D TEAR OF PERONEAL TENDON, RIGHT, SUBSEQUENT ENCOUNTER: Primary | ICD-10-CM

## 2019-06-05 DIAGNOSIS — E66.01 SEVERE OBESITY (HCC): ICD-10-CM

## 2019-06-05 RX ORDER — DICLOFENAC SODIUM 10 MG/G
4 GEL TOPICAL 2 TIMES DAILY
Qty: 100 G | Refills: 1 | Status: SHIPPED | OUTPATIENT
Start: 2019-06-05

## 2019-06-05 NOTE — PATIENT INSTRUCTIONS
Ankle Impingement: Exercises      Complete at least 2 sessions of each exercise each day to get started (no days off). We suggest one session in the morning before work and one session in the evening before bed. If beneficial and able to fit into your schedule, more sessions are recommended. Nothing should cause an increase in pain or swelling. Ankle mobilization    1. Sit in a chair and put your affected foot across your other knee. 2. With both hands, grasp your ankle on both sides just below the ankle bones. 3. While pulling your ankle joint away from your leg with your hands, pull your knee in the opposite direction. This distraction allows the ankle joint to open. 4. While maintaining this position, move your foot through a full range of motion - all the way up toward your shin and all the way down away from your shin. 5. Complete 10 repetitions for 3 sets. Dorsiflexion mobilization    1. With your shoes off, kneel on the ground or place your foot onto a chair or stool. 2. Starting with your knee directly over your ankle, slowly lunge forward, going as far as you can without lifting your heel. 3. Hold for 2 seconds at the end range of motion and return to starting position  4. Repeat 10 times. Complete 3 sets. Calf wall stretch    1. Stand facing a wall with your hands on the wall at about eye level. Put your affected leg about a step behind your other leg. 2. Keeping your back leg straight and your back heel on the floor, bend your front knee and gently bring your hip and chest toward the wall until you feel a stretch in the calf of your back leg. 3. Hold the stretch for 30 seconds. 4. Repeat 3 times per leg. 5. Repeat steps 1 through 4, but this time keep your back knee bent. Calf stair stretch      1. Standing on a stair so that the edge of the stair hits the midfoot, let one foot drop down below the stair so that a stretch is felt in the back of the lower leg.  Keep knee straight. 2. Hold for 30 seconds, 3 times per leg. 3. Repeat the stretch with your knee slightly bent. Resisted ankle inversion    1. Sit on the floor with your good leg crossed over your other leg. 2. Hold both ends of an exercise band and loop the band around the inside of your affected foot. Then press your other foot against the band. 3. Keeping your legs crossed, slowly push your affected foot against the band so that foot moves away from your other foot. Then slowly relax. 4. Repeat 10 times. Complete 3 sets. Resisted ankle eversion    1. Sit on the floor with your legs straight. 2. Hold both ends of an exercise band and loop the band around the outside of your affected foot. Then press your other foot against the band. 3. Keeping your leg straight, slowly push your affected foot outward against the band and away from your other foot without letting your leg rotate. Then slowly relax. 4. Repeat 10 times. Complete 3 sets. Resisted ankle dorsiflexion    1. Tie the ends of an exercise band together to form a loop. Attach one end of the loop to a secure object or shut a door on it to hold it in place. (Or you can have someone hold one end of the loop to provide resistance.)  2. While sitting on the floor or on a couch/bed, loop the other end of the band over the top of your affected foot. 3. Keeping your knee and leg straight, slowly flex your foot to pull back on the exercise band, and then slowly relax. 4. Repeat 10 times. Complete 3 sets. Resisted ankle plantar flexion    1. Sit with your affected leg straight and supported on the floor. Your other leg should be bent, with that foot flat on the floor. 2. Place an elastic band around your affected foot just under the toes. 3. Hold each end of the band in each hand, with your hands above your knees. 4. Keeping your affected leg straight, gently flex your foot downward so your toes are pointed away from your body.  Then slowly relax your foot to the starting position. 5. Repeat 10 times per session. If you have any questions, please call [facility] and ask for Kaley Roblero, Certified Athletic Trainer.

## 2019-06-05 NOTE — PROGRESS NOTES
1. Have you been to the ER, urgent care clinic since your last visit? Hospitalized since your last visit? NO    2. Have you seen or consulted any other health care providers outside of the [address] [place] [address] since your last visit? Include any pap smears or colon screening.  NO

## 2019-06-05 NOTE — PROGRESS NOTES
Patient is lacking active inversion ankle ROM and complains of postero-medial ankle tightness and pain with WB plantarflexion. She is working out (resistance training) as normal but still feels uncomfortable with lateral and jumping/landing motions, likely due to her ROM deficit. I educated patient on causes and treatment of both her ROM deficit and pain. She will be performing subtalar self-mobilizations for her ROM, and talocrural mobilization and calf strretching for her pain daily (or more if helpful). Patient understands and consents to treatment plan for next 6 weeks.

## 2019-06-17 ENCOUNTER — TELEPHONE (OUTPATIENT)
Dept: ORTHOPEDIC SURGERY | Age: 39
End: 2019-06-17

## 2019-06-17 NOTE — TELEPHONE ENCOUNTER
Pt's insurance company, [employer] called and is requesting an up to date restrictions letter. Please fax letter to [phone].

## 2019-06-17 NOTE — LETTER
NOTIFICATION RETURN TO WORK / SCHOOL 
 
6/18/2019 8:29 AM 
 
Ms. Olivier Lorenzo [address] [address] To Whom It May Concern: 
 
Olivier Lorenzo is currently under the care of [address] [place] [address] Elvis Veronica. Ms. Cony Head is not to perform duties that include prolonged standing, heavy lifting, or operating machinery with the right foot/ankle. Restrictions will remain in place until her follow up appointment on 7-17-19. If there are questions or concerns please have the patient contact our office.  
 
 
 
Sincerely, 
 
 
Teresa Orozco MD

## 2019-06-18 NOTE — TELEPHONE ENCOUNTER
Continue same work restrictions from 2/13/19 letter until follow up on 7/17/19.     Humaira Pineda PA-C  6/18/2019

## 2019-07-02 ENCOUNTER — DOCUMENTATION ONLY (OUTPATIENT)
Dept: ORTHOPEDIC SURGERY | Age: 39
End: 2019-07-02

## 2019-07-17 ENCOUNTER — OFFICE VISIT (OUTPATIENT)
Dept: ORTHOPEDIC SURGERY | Age: 39
End: 2019-07-17

## 2019-07-17 VITALS
WEIGHT: 244.4 LBS | HEART RATE: 76 BPM | RESPIRATION RATE: 17 BRPM | DIASTOLIC BLOOD PRESSURE: 91 MMHG | BODY MASS INDEX: 36.2 KG/M2 | TEMPERATURE: 97.6 F | OXYGEN SATURATION: 99 % | HEIGHT: 69 IN | SYSTOLIC BLOOD PRESSURE: 128 MMHG

## 2019-07-17 DIAGNOSIS — S86.311D TEAR OF PERONEAL TENDON, RIGHT, SUBSEQUENT ENCOUNTER: Primary | ICD-10-CM

## 2019-07-17 NOTE — PROGRESS NOTES
1. Have you been to the ER, urgent care clinic since your last visit? Hospitalized since your last visit? No    2. Have you seen or consulted any other health care providers outside of the [address] [place] [address] since your last visit? Include any pap smears or colon screening.  No

## 2019-07-17 NOTE — PROGRESS NOTES
AMBULATORY PROGRESS NOTE      Patient: Mike Lee             MRN: [id number]     SSN: [id number] Body mass index is 36.09 kg/m². YOB: 1980     AGE: 45 y.o. EX: female    PCP: Shabbir Child MD     IMPRESSION/DIAGNOSIS AND TREATMENT PLAN     DIAGNOSES  1. Tear of peroneal tendon, right, subsequent encounter        No orders of the defined types were placed in this encounter. Mike Lee understands her diagnoses and the proposed plan. Plan:    1) Continue HEP w/ Theraband and ROM exercises. 2) Continue activities as tolerated. RTO - PRN, as she is doing much better. HPI AND EXAMINATION     Mike Lee IS A 45 y.o. female who presents to my outpatient office for follow up 13.5 months s/p:      1. RIGHT PERONEAL BREVIS AND LONGUS TENDON TENOSYNOVECTOMIES   2. PRIMARY REPAIR OF PERONEAL BREVIS TENDON  3. EXCISION OF BUTTON HOLE CENTRAL PERONEAL TENDON TEAR     At the last visit, I provided an HEP, prescribed Voltaren 1% gel, and instructed the patient to continue activities as tolerated. Since we saw her last, Ms. Meyer Rinne states that she is doing well. She denies any major pain or soreness. She notes that she recently moved homes, and on the moving day, she experienced some pain, but otherwise, she is doing well. She notes that her ROM is still improving. She finds that when she stands for prolonged periods of time, she will experience some soreness, but she reports that she is able to push through the soreness. She has been using the Voltaren gel, which she finds helps with her soreness. She reports that she is doing much better compared to how she was doing before surgery. The patient works at [employer].      Visit Vitals  BP (!) 128/91 (BP 1 Location: Left arm, BP Patient Position: Sitting)   Pulse 76   Temp 97.6 °F (36.4 °C) (Oral)   Resp 17   Ht 5' 9\" (1.753 m)   Wt 244 lb 6.4 oz (110.9 kg)   SpO2 99%   BMI 36.09 kg/m²      GEN:  Alert, well developed, well nourished, well appearing 45 y. o. female in no acute distress. PSYCH:  Normal affect, mood, and conduct. alert, oriented x 3, no dementia  M/S EXAMINATION OF: right foot/ankle  INCISION: well healed surgical scar   SKIN: No swelling posterolaterally, no erythema, no ecchymosis, no warmth  TENDERNESS: No tenderness to palpation at this time. NEUROVASCULAR:  grossly intact. Positive distal pulses and capillary refill. DVT ASSESSMENT: The calf is not tender to palpation. No evidence of DVT seen on physical exam.  ROM: improving with limitation noted              Mild stiffness with inversion, lacks full inversion              Full eversion              30 degrees of PF              DF to neutral   Great toe extends to just about neutral     No ankle instability. CHART REVIEW     Past Medical History:   Diagnosis Date    Chronic pain     knees and ankles    GERD (gastroesophageal reflux disease)     during pregancy    Headache     Hemorrhoid     History of body piercing     Hypercholesterolemia     IUD (intrauterine device) in place     Migraine headache     SIFUENTES (nonalcoholic steatohepatitis) (per outside records) 6/11/2017    denies liver disease 5/22/2018    Tendinitis      Current Outpatient Medications   Medication Sig    diclofenac (VOLTAREN) 1 % gel Apply 4 g to affected area two (2) times a day.  naproxen (NAPROSYN) 500 mg tablet Take 1 Tab by mouth two (2) times daily as needed.  HYDROcodone-acetaminophen (NORCO) 7.5-325 mg per tablet TAKE ONE TO TWO TABLETS BY MOUTH Q 4 - 6 HRS PRN PAIN **AFTER SURGERY**  Indications: Pain    loratadine (CLARITIN) 10 mg tablet Take 10 mg by mouth daily as needed for Allergies.  triamcinolone (NASACORT) 55 mcg nasal inhaler 2 Sprays daily as needed.  multivitamin (ONE A DAY) tablet Take 1 Tab by mouth daily.  cholecalciferol, VITAMIN D3, (VITAMIN D3) 5,000 unit tab tablet Take  by mouth every Wednesday.      No current facility-administered medications for this visit. Allergies   Allergen Reactions    Pcn [Penicillins] Hives, Swelling and Other (comments)     Throat closes    Shellfish Derived Hives, Swelling and Other (comments)     Throat closes     Past Surgical History:   Procedure Laterality Date    FOOT/TOES SURGERY PROC UNLISTED      HX OTHER SURGICAL      Tooth extraction (molars)     Social History     Occupational History    Not on file   Tobacco Use    Smoking status: Never Smoker    Smokeless tobacco: Never Used   Substance and Sexual Activity    Alcohol use: Yes     Comment: rare    Drug use: No    Sexual activity: Not on file     Family History   Problem Relation Age of Onset    Diabetes Mother     Hypertension Mother    Wilian Carry Bipolar Disorder Mother     Elevated Lipids Father     COPD Maternal Grandmother     Emphysema Maternal Grandmother     Lung Disease Maternal Grandmother     No Known Problems Maternal Grandfather     Stroke Paternal Grandmother     No Known Problems Paternal Grandfather     Allergic Rhinitis Daughter     Eczema Daughter     Headache Daughter         REVIEW OF SYSTEMS : 7/17/2019  ALL BELOW ARE Negative except : SEE HPI     Constitutional: Negative for fever, chills and weight loss. Neg Weight Loss  Cardiovascular: Negative for chest pain, claudication and leg swelling. SOB, GR   Gastrointestinal/Urological: Negative for  pain, N/V/D/C, Blood in stool or urine,dysuria                         Hematuria, Incontinence, pelvic pain  Musculoskeletal: see HPI. Neurological: Negative for dizziness and weakness, headaches,Visual Changes             Confusion,  Or Seizures,   Psychiatric/Behavioral: Negative for depression, memory loss and substance abuse. Extremities:  Negative for hair changes, rash or skin lesion changes. Hematologic: Negative for Bleeding problems, bruising, pallor or swollen lymph nodes.   Peripheral Vascular: No calf pain, vascular vein tenderness to calf pain              No calf throbbing, posterior knee throbbing pain     DIAGNOSTIC IMAGING     No notes on file    Please see above section of this report. I have personally reviewed the results of the above study. The interpretation of this study is my professional opinion. Written by Shannon Hoskins, as dictated by Dr. Mayela Bonds. I, Dr. Mayela Bonds, confirm that all documentation is accurate.

## 2019-12-30 ENCOUNTER — OFFICE VISIT (OUTPATIENT)
Dept: ORTHOPEDIC SURGERY | Facility: CLINIC | Age: 39
End: 2019-12-30

## 2019-12-30 VITALS
DIASTOLIC BLOOD PRESSURE: 75 MMHG | OXYGEN SATURATION: 99 % | HEIGHT: 69 IN | WEIGHT: 239 LBS | SYSTOLIC BLOOD PRESSURE: 111 MMHG | BODY MASS INDEX: 35.4 KG/M2 | TEMPERATURE: 97.8 F | RESPIRATION RATE: 16 BRPM | HEART RATE: 65 BPM

## 2019-12-30 DIAGNOSIS — S80.02XA CONTUSION OF LEFT KNEE, INITIAL ENCOUNTER: ICD-10-CM

## 2019-12-30 DIAGNOSIS — S83.92XA SPRAIN OF LEFT KNEE, UNSPECIFIED LIGAMENT, INITIAL ENCOUNTER: Primary | ICD-10-CM

## 2019-12-30 DIAGNOSIS — S81.012A LACERATION OF LEFT KNEE, INITIAL ENCOUNTER: ICD-10-CM

## 2019-12-30 DIAGNOSIS — M25.562 ACUTE PAIN OF LEFT KNEE: ICD-10-CM

## 2019-12-30 NOTE — PROGRESS NOTES
1. Have you been to the ER, urgent care clinic since your last visit? Hospitalized since your last visit? Yes, Urgent Care 12/08/19    2. Have you seen or consulted any other health care providers outside of the [address] [place] [address] since your last visit? Include any pap smears or colon screening.  Yes, see above

## 2019-12-30 NOTE — PROGRESS NOTES
Patient: Mario Fermin                MRN: [id number]       SSN: [id number]  YOB: 1980        AGE: 44 y.o. SEX: female    PCP: Latanya Schulz MD  12/30/19    Chief Complaint   Patient presents with    Knee Pain     left knee pain     HISTORY:  Mario Fermin is a 44 y.o. female who sustained a work related left knee injury on 12/8/19. She struck her left knee on a stool when she tripped and fell as she was working. She was seen at [facility] on the same day where left knee x rays revealed no acute findings per patient. She was provided ACE wrap and referred for orthopedic consultation. She has been experiencing left knee pain and swelling since the injury. She has had difficulty kneeling. She has seen Dr. Beth Dallas for right peroneus brevis repair on 6/1/18. Pain Assessment  12/30/2019   Location of Pain Knee   Location Modifiers Left   Severity of Pain 5   Quality of Pain Aching;Dull;Grinding; Popping;Cracking;Locking   Duration of Pain Persistent   Duration of Pain Comment -   Frequency of Pain Constant   Aggravating Factors Kneeling;Walking;Standing;Bending;Stairs   Aggravating Factors Comment pressure   Limiting Behavior Yes   Relieving Factors Rest   Result of Injury Yes   Work-Related Injury Yes   How long out of work? has not missed any time off from work at this point   Type of Injury Fall     Occupation, etc:  Ms. Helen Bergman works as a  for [employer]. She is a single mom living in [place] with her 15 yo daughter. Ms. Helen Bergman weighs 239 lbs and is 5'9\" tall.        No results found for: HBA1C, HGBE8, RSA6TIRN, ALP5PGJM, XVI9UUQN  Weight Metrics 12/30/2019 7/17/2019 6/5/2019 2/13/2019 11/14/2018 10/17/2018 9/4/2018   Weight 239 lb 244 lb 6.4 oz 245 lb 239 lb 6.4 oz 235 lb 3.2 oz 236 lb 12.8 oz 233 lb   BMI 35.29 kg/m2 36.09 kg/m2 36.18 kg/m2 35.35 kg/m2 34.73 kg/m2 34.97 kg/m2 34.41 kg/m2       Patient Active Problem List   Diagnosis Code    History of abnormal cervical Pap smear Z87.42    SIFUENTES (nonalcoholic steatohepatitis) (per outside records) K75.81    Severe obesity ([facility] [address] E66.01     REVIEW OF SYSTEMS: All Below are Negative except: See HPI   Constitutional: negative for fever, chills, and weight loss. Cardiovascular: negative for chest pain, claudication, leg swelling, SOB, GR   Gastrointestinal: Negative for pain, N/V/C/D, Blood in stool or urine, dysuria, hematuria, incontinence, pelvic pain. Musculoskeletal: See HPI   Neurological: Negative for dizziness and weakness. Negative for headaches, Visual changes, confusion, seizures   Phychiatric/Behavioral: Negative for depression, memory loss, substance abuse. Extremities: Negative for hair changes, rash, or skin lesion changes. Hematologic: Negative for bleeding problems, bruising, pallor or swollen lymph nodes   Peripheral Vascular: No calf pain, no circulation deficits.     Social History     Socioeconomic History    Marital status: UNKNOWN     Spouse name: Not on file    Number of children: Not on file    Years of education: Not on file    Highest education level: Not on file   Occupational History    Not on file   Social Needs    Financial resource strain: Not on file    Food insecurity:     Worry: Not on file     Inability: Not on file    Transportation needs:     Medical: Not on file     Non-medical: Not on file   Tobacco Use    Smoking status: Never Smoker    Smokeless tobacco: Never Used   Substance and Sexual Activity    Alcohol use: Yes     Comment: rare    Drug use: No    Sexual activity: Not on file   Lifestyle    Physical activity:     Days per week: Not on file     Minutes per session: Not on file    Stress: Not on file   Relationships    Social connections:     Talks on phone: Not on file     Gets together: Not on file     Attends [employer] service: Not on file     Active member of club or organization: Not on file     Attends meetings of clubs or organizations: Not on file     Relationship status: Not on file    Intimate partner violence:     Fear of current or ex partner: Not on file     Emotionally abused: Not on file     Physically abused: Not on file     Forced sexual activity: Not on file   Other Topics Concern    Not on file   Social History Narrative    Not on file      Allergies   Allergen Reactions    Pcn [Penicillins] Hives, Swelling and Other (comments)     Throat closes    Shellfish Derived Hives, Swelling and Other (comments)     Throat closes      Current Outpatient Medications   Medication Sig    naproxen (NAPROSYN) 500 mg tablet Take 1 Tab by mouth two (2) times daily as needed.  loratadine (CLARITIN) 10 mg tablet Take 10 mg by mouth daily as needed for Allergies.  diclofenac (VOLTAREN) 1 % gel Apply 4 g to affected area two (2) times a day.  HYDROcodone-acetaminophen (NORCO) 7.5-325 mg per tablet TAKE ONE TO TWO TABLETS BY MOUTH Q 4 - 6 HRS PRN PAIN **AFTER SURGERY**  Indications: Pain    triamcinolone (NASACORT) 55 mcg nasal inhaler 2 Sprays daily as needed.  multivitamin (ONE A DAY) tablet Take 1 Tab by mouth daily.  cholecalciferol, VITAMIN D3, (VITAMIN D3) 5,000 unit tab tablet Take  by mouth every Wednesday. No current facility-administered medications for this visit.        PHYSICAL EXAMINATION:  Visit Vitals  /75 (BP 1 Location: Left arm, BP Patient Position: Sitting)   Pulse 65   Temp 97.8 °F (36.6 °C) (Oral)   Resp 16   Ht 5' 9\" (1.753 m)   Wt 239 lb (108.4 kg)   SpO2 99%   BMI 35.29 kg/m²      ORTHO EXAMINATION:  Examination Right knee Left knee   Skin Intact Intact, healing 1 cm laceration over inferior patella   Range of motion 120-0 110-0   Effusion - -   Medial joint line tenderness - + anterior   Lateral joint line tenderness - -   Popliteal tenderness - -   Osteophytes palpable - -   Sonnys - -   Patella crepitus - -   Anterior drawer - -   Lateral laxity - -   Medial laxity - -   Varus deformity - -   Valgus deformity - -   Pretibial edema - -   Calf tenderness - -     2 healing left pretibial abrasions    IMPRESSION:      ICD-10-CM ICD-9-CM    1. Sprain of left knee, unspecified ligament, initial encounter S83. 92XA 844.9 REFERRAL TO PHYSICAL THERAPY   2. Acute pain of left knee M25.562 719.46 REFERRAL TO PHYSICAL THERAPY   3. Contusion of left knee, initial encounter S80. 02XA 924.11 REFERRAL TO PHYSICAL THERAPY   4. Laceration of left knee, initial encounter S81.012A 891.0 REFERRAL TO PHYSICAL THERAPY     PLAN:  She will start a brief course of outpatient physical therapy. There is no need for surgery at this time. Continue full work duties. She will follow up as needed.       Scribed by Adam Garcia (Hassan Lefort) as dictated by Rhonda Rodriguez MD

## 2021-10-19 NOTE — PROGRESS NOTES
AMBULATORY PROGRESS NOTE      Patient: Jb Lou             MRN: [id number]     SSN: [id number] Body mass index is 35.73 kg/m². YOB: 1980     AGE: 39 y.o. EX: female    PCP: Roscoe Sage MD       IMPRESSION //  DIAGNOSIS AND TREATMENT PLAN        Jb Lou has a diagnosis of:      She has a right ankle sprain, which I think will improve, with modified activities, protection of brace, and 3 to 6-week follow-up      DIAGNOSES    1. Sprain of anterior talofibular ligament of right ankle, initial encounter    2. Chronic pain of right ankle        Orders Placed This Encounter    Generic Supply Order     R AS/AC brace will be given and placed on patient    AMB POC XRAY, ANKLE; COMPLETE, 3+ VIE     Order Specific Question:   Reason for Exam     Answer:   PAIN     Order Specific Question:   Is Patient Pregnant? Answer:   Unknown        PLAN:    1. Obtain 3-View XR of right ankle. 2. DME: R AS/AC brace will be given and placed on patient    RTO-  3 weeks    Jb Lou  expresses understanding of the diagnosis, treatment plan, and all of their proposed questions were answered to their satisfaction. Patient education has been provided re the diagnoses. HPI //  OBJECTIVE EXAMINATION        Jb Lou IS A 39 y.o. female who is a/an  new patient, presenting to my outpatient office for evaluation of  the following chief complaint(s):     Chief Complaint   Patient presents with    Ankle Pain     right ankle       Jb Lou presents today w/ traumatic right ankle pain . She reports twisting her right ankle on uneven ground while walking outside. She states her right ankle swelling makes it difficult to zip her shoes up. She had a ankle surgery, many years ago, on this same, right ipsilateral side. .  In 2018, she never underwent:    1. RIGHT PERONEAL BREVIS AND LONGUS TENDON TENOSYNOVECTOMIES   2. PRIMARY REPAIR OF PERONEAL BREVIS TENDON  3. EXCISION OF BUTTON HOLE CENTRAL PERONEAL TENDON TEAR      Visit Vitals  Temp 96.9 °F (36.1 °C) (Temporal)   Ht 5' 8\" (1.727 m)   Wt 235 lb (106.6 kg)   SpO2 92%   BMI 35.73 kg/m²       Appearance: Alert, well appearing and pleasant patient who is in no distress, oriented to person, place/time, and who follows commands. This patient is accompanied in the examination room by her  self. There is signs of: no dementia  Psychiatric: Affect/mood are appropriate. Speech normal in context and clarity, memory intact grossly, no involuntary movements - tremors. Patient arrives to office via: without assistive device:   H EENT (2): Head normocephalic & atraumatic. Eye: pupils are round// EOM are intact // Neck: ROM WNL  // Hearings Intact   Respiratory: Breathing non labored     ANKLE/FOOT right    Gait: normal  Tenderness: mild over  anterolateral ankle  Cutaneous:  Mild swelling at ATFL CL complex region  Joint Motion:   WNL //      Joint / Tendon Stability: No Ankle or Subtalar instability or joint laxity. No peroneal sublux ability or dislocation  Alignment: neutral Hindfoot,    Neuro Motor/Sensory: NL/NL  Vascular: NL foot/ankle pulses,   Lymphatics: No extremity lymphedema, No calf swelling, no tenderness to calf muscles. CHART REVIEW     Oz Gay has been experiencing pain and discomfort confirmed as outlined in the pain assessment outlined below.  was reviewed by Dionna Motley MD on 10/20/2021. Pain Assessment  10/20/2021   Location of Pain Knee   Location Modifiers Right   Severity of Pain 8   Quality of Pain Aching; Other (Comment); Throbbing   Quality of Pain Comment swelling   Duration of Pain -   Duration of Pain Comment -   Frequency of Pain Constant   Date Pain First Started (No Data)   Date Pain First Started Comment 2018   Aggravating Factors Bending;Stretching;Walking   Aggravating Factors Comment -   Limiting Behavior Yes   Relieving Factors Nothing   Result of Injury No   Work-Related Injury -   How long out of work? -   Type of Injury -        Baljinder Tate  has a past medical history of Chronic pain, GERD (gastroesophageal reflux disease), Headache, Hemorrhoid, History of body piercing, Hypercholesterolemia, IUD (intrauterine device) in place, Migraine headache, SIFUENTES (nonalcoholic steatohepatitis) (per outside records) (6/11/2017), and Tendinitis. She also has no past medical history of Arrhythmia, Calculus of kidney, Contact dermatitis and other eczema, due to unspecified cause, Depression, Psychotic disorder ([facility] [address] PUD (peptic ulcer disease), Thyroid disease, or Trauma. Patients is employed at:         Past Medical History:   Diagnosis Date    Chronic pain     knees and ankles    GERD (gastroesophageal reflux disease)     during pregancy    Headache     Hemorrhoid     History of body piercing     Hypercholesterolemia     IUD (intrauterine device) in place     Migraine headache     SIFUENTES (nonalcoholic steatohepatitis) (per outside records) 6/11/2017    denies liver disease 5/22/2018    Tendinitis      Past Surgical History:   Procedure Laterality Date    FOOT/TOES SURGERY PROC UNLISTED      HX OTHER SURGICAL      Tooth extraction (molars)     Current Outpatient Medications   Medication Sig    diclofenac (VOLTAREN) 1 % gel Apply 4 g to affected area two (2) times a day.  naproxen (NAPROSYN) 500 mg tablet Take 1 Tab by mouth two (2) times daily as needed.  HYDROcodone-acetaminophen (NORCO) 7.5-325 mg per tablet TAKE ONE TO TWO TABLETS BY MOUTH Q 4 - 6 HRS PRN PAIN **AFTER SURGERY**  Indications: Pain    loratadine (CLARITIN) 10 mg tablet Take 10 mg by mouth daily as needed for Allergies.  triamcinolone (NASACORT) 55 mcg nasal inhaler 2 Sprays daily as needed.  multivitamin (ONE A DAY) tablet Take 1 Tab by mouth daily.  cholecalciferol, VITAMIN D3, (VITAMIN D3) 5,000 unit tab tablet Take  by mouth every Wednesday.      No current facility-administered medications for this visit. Allergies   Allergen Reactions    Pcn [Penicillins] Hives, Swelling and Other (comments)     Throat closes    Shellfish Derived Hives, Swelling and Other (comments)     Throat closes     Social History     Occupational History    Not on file   Tobacco Use    Smoking status: Never Smoker    Smokeless tobacco: Never Used   Substance and Sexual Activity    Alcohol use: Yes     Comment: rare    Drug use: No    Sexual activity: Not on file     Family History   Problem Relation Age of Onset    Diabetes Mother     Hypertension Mother    Eb Salshawn Bipolar Disorder Mother     Elevated Lipids Father     COPD Maternal Grandmother     Emphysema Maternal Grandmother     Lung Disease Maternal Grandmother     No Known Problems Maternal Grandfather     Stroke Paternal Grandmother     No Known Problems Paternal Grandfather     Allergic Rhinitis Daughter     Eczema Daughter     Headache Daughter         DIAGNOSTIC LAB DATA      No results found for: HBA1C, TND0ORQG, WLE5JCND //   Lab Results   Component Value Date/Time    Glucose 94 05/21/2018 01:11 PM        No results found for: EOW1WHOM, LZQ4GPIM      No results found for: VITD3, XQVID2, XQVID3, XQVID, VD3RIA, HMEG73QTPJC     Drug Screen Most Recent Result Date    No resulted procedures found. REVIEW OF SYSTEMS : 10/20/2021  ALL BELOW ARE Negative except : SEE HPI     All other systems reviewed and are negative. 12 point review of systems otherwise negative unless noted in HPI. DIAGNOSTIC IMAGING /ORDERS       Orders Placed This Encounter    Generic Supply Order     R AS/AC brace will be given and placed on patient    AMB POC XRAY, ANKLE; COMPLETE, 3+ VIE     Order Specific Question:   Reason for Exam     Answer:   PAIN     Order Specific Question:   Is Patient Pregnant?      Answer:   Unknown        ANKLE X RAYS 3 VIEWS Right   X RAYS AT [facility]  10/20/2021    NON WEIGHT BEARING    X RAYS AT [address] [place] [address]  10/20/2021    Bones: No fractures or dislocations. No focal osteolytic or osteoblastic process     Bone Spurs: No significant bone spurs  Alignment: Ankle mortise alignment is congruent, Tibial plafond and talar dome intact. No Osteochondral defects seen   Joint: No Significant OA changes present  Soft Tissues: Normal, No radiopaque foreign body     No abnormal calcific densities to soft tissues    No ankle joint effusion in lateral projection. Mineralization: Suggests no Osteopenia    I have personally reviewed the results of the above study. The interpretation of this study is my professional opinion          On this date 10/20/2021 I have spent 30 minutes reviewing previous notes, test results and face to face with the patient discussing the diagnosis and importance of compliance with the treatment plan as well as documenting on the day of the visit. An electronic signature was used to authenticate this note. Disclaimer: Sections of this note are dictated using utilizing voice recognition software, which may have resulted in some phonetic based errors in grammar and contents. Even though attempts were made to correct all the mistakes, some may have been missed, and remained in the body of the document. If questions arise, please contact our department. Colleen Hoover may have a reminder for a \"due or due soon\" health maintenance. I have asked that she contact her primary care provider for follow-up on this health maintenance.     Maru Lawrence, as directed by Diana  10/20/2021  8:57 AM

## 2021-10-20 ENCOUNTER — OFFICE VISIT (OUTPATIENT)
Dept: ORTHOPEDIC SURGERY | Age: 41
End: 2021-10-20
Payer: COMMERCIAL

## 2021-10-20 VITALS — OXYGEN SATURATION: 92 % | WEIGHT: 235 LBS | TEMPERATURE: 96.9 F | HEIGHT: 68 IN | BODY MASS INDEX: 35.61 KG/M2

## 2021-10-20 DIAGNOSIS — M25.571 CHRONIC PAIN OF RIGHT ANKLE: ICD-10-CM

## 2021-10-20 DIAGNOSIS — S93.491A SPRAIN OF ANTERIOR TALOFIBULAR LIGAMENT OF RIGHT ANKLE, INITIAL ENCOUNTER: Primary | ICD-10-CM

## 2021-10-20 DIAGNOSIS — G89.29 CHRONIC PAIN OF RIGHT ANKLE: ICD-10-CM

## 2021-10-20 PROCEDURE — 99213 OFFICE O/P EST LOW 20 MIN: CPT | Performed by: ORTHOPAEDIC SURGERY

## 2021-10-20 PROCEDURE — 73610 X-RAY EXAM OF ANKLE: CPT | Performed by: ORTHOPAEDIC SURGERY

## 2022-11-15 ENCOUNTER — OFFICE VISIT (OUTPATIENT)
Dept: ORTHOPEDIC SURGERY | Age: 42
End: 2022-11-15
Payer: COMMERCIAL

## 2022-11-15 DIAGNOSIS — M21.371 ACQUIRED RIGHT FOOT DROP: ICD-10-CM

## 2022-11-15 DIAGNOSIS — S93.491S SPRAIN OF ANTERIOR TALOFIBULAR LIGAMENT OF RIGHT ANKLE, SEQUELA: Primary | ICD-10-CM

## 2022-11-15 DIAGNOSIS — G89.29 CHRONIC PAIN OF RIGHT ANKLE: ICD-10-CM

## 2022-11-15 DIAGNOSIS — M79.604 LEG PAIN, LATERAL, RIGHT: ICD-10-CM

## 2022-11-15 DIAGNOSIS — M25.571 CHRONIC PAIN OF RIGHT ANKLE: ICD-10-CM

## 2022-11-15 PROCEDURE — 99213 OFFICE O/P EST LOW 20 MIN: CPT | Performed by: ORTHOPAEDIC SURGERY

## 2022-11-15 NOTE — PROGRESS NOTES
AMBULATORY PROGRESS NOTE      Patient: Danni Yao             MRN: [id number]     SSN: [id number] There is no height or weight on file to calculate BMI. YOB: 1980     AGE: 43 y.o. EX: female    PCP: Suzy Joiner MD       IMPRESSION //  DIAGNOSIS AND TREATMENT PLAN      Danni Yao has a diagnosis of:      Patient cannot recall any isolated bowel injury episode of injury. But in July she just had discomfort, evidence of discomfort to the lateral portion of her ankle plantar portion of her right foot. X-ray OBICI today, impression is mild plantar fasciitis, as well as reports of pain that starts in lumbar ankle shoots upwards to her right lateral part of her right knee. I see no signs of SPN or DPN her work or compression neuropathy to the right foot and ankle. Is a detect no gross instability of the right ankle at this current time. She had an MRI, that was done at the [address] [place] [address], on 9/19/2022. I did reviewed that some images on her phone. She had a old ATFL capsular tear and looking at the the actual images, and some mild OA changes and to the ankle joint and a small joint effusion. Continue conservative care is recommended for her at this current time. Should she to develop gross instability of her ankle then then I want to obtain stress x-rays, and and then she may require ankle ligament reconstructive surgery. But at this point when I examined her, she still has a good endpoint when stressing the right and left ankle comparatively, with ankle in 10 degrees 50 degrees of plantarflexion and performing anterior drawer there is no grind no popping or clicking on either side. DIAGNOSES    1. Sprain of anterior talofibular ligament of right ankle, sequela    2. Acquired right foot drop    3. Leg pain, lateral, right    4.  Chronic pain of right ankle        Orders Placed This Encounter    Generic Supply Order     A right AS/AC brace will be given and placed on the patient. REFERRAL TO PHYSICAL THERAPY     Referral Priority:   Routine     Referral Type:   PT/OT/ST     Referral Reason:   Specialty Services Required     Requested Specialty:   Physical Therapy     Number of Visits Requested:   1            PLAN:    1. DME: A right AS/AC brace will be given and placed on the patient. 2. Referral to PT    RTO 7 weeks    There are no Patient Instructions on file for this visit. Please follow up with your PCP for any health maintenance as recommended         Epi Alanis  expresses understanding of the diagnosis, treatment plan, and all of their proposed questions were answered to their satisfaction. Patient education has been provided re the diagnoses. HPI //  OBJECTIVE EXAMINATION      Epi Alanis IS A 43 y.o. female who is a/an  established patient, presenting to my outpatient office for evaluation of  the following chief complaint(s):     Chief Complaint   Patient presents with    Ankle Pain     right         At [address] [place] [address] (10/20/2021), Epi Alanis presented with a right ankle sprain. I obtained right ankle 3V XR in the office. A right AS/AC brace was given and placed on the patient. Since LOV, Epi Alanis reports that about 07/05/2022 she experienced sudden onset of right foot drop and swelling. She followed up at her [place] PCP and was instructed to resume wearing her CAM walker boot. She was referred for a right ankle MRI that revealed incompetency of an ankle ligament. She describes pain in the lateral ankle spreading up the lateral leg to the knee with prolonged weightbearing. She notes some pain before the incident. She also notes frequent popping and creaking in the right ankle. She does not recall any history of spinal issues or sciatica. Of note, Epi Alanis lives with and cares for her mother, who has dementia. There were no vitals taken for this visit.     Appearance: Alert, well appearing and pleasant patient who is in no distress, oriented to person, place/time, and who follows commands. Normal dress/motor activity/thought processes/memory. This patient is accompanied in the examination room by her  self. Patient arrives to office via: with assistive device: right short CAM walker boot. Footwear: knee-high cowboy boot (left)    Psychiatric:  Normal Affect/mood. Judgement, behavior, and conduct are appropriate. Speech normal in context and clarity, memory intact grossly, no involuntary movements - tremors. H EENT (2): Head normocephalic & atraumatic. Eye: pupils are round// EOM are intact // Neck: ROM WNL  // Hearings Intact   Respiratory: Breathing non labored     ANKLE/FOOT right    Gait: uses assistive device - right short CAM walker boot  Tenderness: mild over the posterolateral ankle, plantar arch, and   Cutaneous: Small fullness over the proximal anterolateral tibia. Joint Motion: No active dorsiflexion. WNL   Joint / Tendon Stability:  No Ankle or Subtalar instability or joint laxity. No peroneal sublux ability or dislocation  Alignment: neutral Hindfoot,    Neuro Motor/Sensory: No active dorsiflexion/NL  Vascular: NL foot/ankle pulses,   Lymphatics: No extremity lymphedema, No calf swelling, no tenderness to calf muscles. CHART REVIEW     Viktoriya Gutierrez has been experiencing pain and discomfort confirmed as outlined in the pain assessment outlined below.  was reviewed by Katrina Candelaria MD, on 11/15/2022. Pain Assessment  10/20/2021   Location of Pain Knee   Location Modifiers Right   Severity of Pain 8   Quality of Pain Aching; Other (Comment); Throbbing   Quality of Pain Comment swelling   Duration of Pain -   Duration of Pain Comment -   Frequency of Pain Constant   Date Pain First Started (No Data)   Date Pain First Started Comment 2018   Aggravating Factors Bending;Stretching;Walking   Aggravating Factors Comment -   Limiting Behavior Yes   Relieving Factors Nothing   Result of Injury No Work-Related Injury -   How long out of work? -   Type of Injury -        Lionel Navarrete  has a past medical history of Chronic pain, GERD (gastroesophageal reflux disease), Headache, Hemorrhoid, History of body piercing, Hypercholesterolemia, IUD (intrauterine device) in place, Migraine headache, SIFUENTES (nonalcoholic steatohepatitis) (per outside records) (6/11/2017), and Tendinitis. She has no past medical history of Arrhythmia, Calculus of kidney, Contact dermatitis and other eczema, due to unspecified cause, Depression, Psychotic disorder ([facility] [address] PUD (peptic ulcer disease), Thyroid disease, or Trauma. Patients is employed at:          has a past medical history of Chronic pain, GERD (gastroesophageal reflux disease), Headache, Hemorrhoid, History of body piercing, Hypercholesterolemia, IUD (intrauterine device) in place, Migraine headache, SIFUENTES (nonalcoholic steatohepatitis) (per outside records) (6/11/2017), and Tendinitis. She has no past medical history of Arrhythmia, Calculus of kidney, Contact dermatitis and other eczema, due to unspecified cause, Depression, Psychotic disorder ([facility] [address] PUD (peptic ulcer disease), Thyroid disease, or Trauma. has a past surgical history that includes hx other surgical and foot/toes surgery proc unlisted. family history includes Allergic Rhinitis in her daughter; Bipolar Disorder in her mother; COPD in her maternal grandmother; Diabetes in her mother; Eczema in her daughter; Elevated Lipids in her father; Emphysema in her maternal grandmother; Headache in her daughter; Hypertension in her mother; Lung Disease in her maternal grandmother; No Known Problems in her maternal grandfather and paternal grandfather; Stroke in her paternal grandmother. Current Outpatient Medications   Medication Sig    diclofenac (VOLTAREN) 1 % gel Apply 4 g to affected area two (2) times a day. naproxen (NAPROSYN) 500 mg tablet Take 1 Tab by mouth two (2) times daily as needed. HYDROcodone-acetaminophen (NORCO) 7.5-325 mg per tablet TAKE ONE TO TWO TABLETS BY MOUTH Q 4 - 6 HRS PRN PAIN **AFTER SURGERY**  Indications: Pain    loratadine (CLARITIN) 10 mg tablet Take 10 mg by mouth daily as needed for Allergies. triamcinolone (NASACORT) 55 mcg nasal inhaler 2 Sprays daily as needed. multivitamin (ONE A DAY) tablet Take 1 Tab by mouth daily. cholecalciferol, VITAMIN D3, (VITAMIN D3) 5,000 unit tab tablet Take  by mouth every Wednesday. No current facility-administered medications for this visit. Allergies   Allergen Reactions    Pcn [Penicillins] Hives, Swelling and Other (comments)     Throat closes    Shellfish Derived Hives, Swelling and Other (comments)     Throat closes     Social History     Occupational History    Not on file   Tobacco Use    Smoking status: Never    Smokeless tobacco: Never   Substance and Sexual Activity    Alcohol use: Yes     Comment: rare    Drug use: No    Sexual activity: Not on file       reports that she has never smoked. She has never used smokeless tobacco. She reports current alcohol use. She reports that she does not use drugs. DIAGNOSTIC LAB DATA      No results found for: HBA1C, CZO5DKOY, ONP0SGGF //   Lab Results   Component Value Date/Time    Glucose 94 05/21/2018 01:11 PM        No results found for: BKU1IRSC, YYX5OFKO      No results found for: VITD3, XQVID2, XQVID3, XQVID, VD3RIA, VGKA00YXKAQ     Drug Screen Most Recent Result Date    No resulted procedures found. REVIEW OF SYSTEMS : 11/15/2022  ALL BELOW ARE Negative except : SEE HPI     All other systems reviewed and are negative. 12 point review of systems otherwise negative unless noted in HPI. RADIOGRAPHS// IMAGING//DIAGNOSTIC DATA     Orders Placed This Encounter    Generic Supply Order    REFERRAL TO PHYSICAL THERAPY           I have personally reviewed the images of the above study.  The interpretation of this study is my professional opinion           I have spent 30 minutes reviewing the previous notes, reviewing diagnostic studies [Advanced  Imaging, Diagnostic test results (x-rays)] and had a direct face to face with the patient discussing the diagnosis and importance of compliance with the treatment and plan. There is  discussion for the potential for surgery, answering all questions, as well as documenting patient care coordination for this individual on the day of the visit. Disclaimer:     Sections of this note are dictated using utilizing voice recognition software, which may have resulted in some phonetic based errors in grammar and contents. Even though attempts were made to correct all the mistakes, some may have been missed, and remained in the body of the document. If questions arise, please contact our department. An electronic signature was used to authenticate this note. Luda Pate may have a reminder for a \"due or due soon\" health maintenance. I have asked that she contact her primary care provider for follow-up on this health maintenance. There are no Patient Instructions on file for this visit. Please follow up with your PCP for any health maintenance as recommended.                 Scribed by Collins Beth, as dictated by Carlos Manuel Valera MD.   11/15/2022  7:37 AM

## 2022-12-05 ENCOUNTER — HOSPITAL ENCOUNTER (OUTPATIENT)
Dept: PHYSICAL THERAPY | Age: 42
Discharge: HOME | End: 2022-12-05
Attending: ORTHOPAEDIC SURGERY
Payer: COMMERCIAL

## 2022-12-05 PROCEDURE — 97161 PT EVAL LOW COMPLEX 20 MIN: CPT

## 2022-12-05 PROCEDURE — 97110 THERAPEUTIC EXERCISES: CPT

## 2022-12-05 PROCEDURE — 97535 SELF CARE MNGMENT TRAINING: CPT

## 2022-12-05 NOTE — PROGRESS NOTES
PT DAILY TREATMENT NOTE     Patient Name: Gloria Wu  Date:2022  : 1980  [x]  Patient  Verified  Payor: Camelia Mayorga / Plan: [address] HMO / Product Type: HMO /    In time:1004am  Out time:1049am  Total Treatment Time (min): 45  Visit #: 1 of 12     Treatment Area: Foot drop, right foot [M21.371]  Pain in right leg [M79.604]  Pain in right ankle and joints of right foot [M25.571]    SUBJECTIVE  Pain Level (0-10 scale): 3  Any medication changes, allergies to medications, adverse drug reactions, diagnosis change, or new procedure performed?: [x] No    [] Yes (see summary sheet for update)  Subjective functional status/changes:   [] No changes reported  See eval    OBJECTIVE    15 min [x]Eval                  []Re-Eval       10 min Therapeutic Exercise:  [] See flow sheet : HEP   Rationale: increase ROM and increase strength to improve the patients ability to manage ADLs. 20 min Self Care/Home Management: pt education on relevant anatomy and pathology as it relates to self care. Rationale: increase proprioception and improve understanding   to improve the patients ability to manage self care.            With   [] TE   [] TA   [] neuro   [] other: Patient Education: [x] Review HEP    [] Progressed/Changed HEP based on:   [] positioning   [] body mechanics   [] transfers   [] heat/ice application    [] other:      Other Objective/Functional Measures: see eval     Pain Level (0-10 scale) post treatment: 3    ASSESSMENT/Changes in Function: see POC    Patient will continue to benefit from skilled PT services to modify and progress therapeutic interventions, address functional mobility deficits, address ROM deficits, address strength deficits, analyze and address soft tissue restrictions, analyze and cue movement patterns, analyze and modify body mechanics/ergonomics, assess and modify postural abnormalities, address imbalance/dizziness, and instruct in home and community integration to attain remaining goals. []  See Plan of Care  []  See progress note/recertification  []  See Discharge Summary         Progress towards goals / Updated goals:  Short Term Goals: To be accomplished in 2 weeks:  Pt will report daily HEP compliance to maximize ease of self care. Eval: HEP assigned  Pt will perform 10 SLR without quad lag, indicating improved right LE NM control for ambulation. Eval: quad lag  Long Term Goals: To be accomplished in 6 weeks:  Pt will improve her right ankle dorsiflexion to 5 degrees to improve ease of ambulation. Eval: lacking 10 deg from neutral  Pt will improve her ankle eversion to 25 deg to improve ankle mobility with ambulation. Eval: 15 deg  Pt will improve her right ankle strength to 4/5 MMT to improve stability with standing. Eval: 2+/5  Pt will perform 10 heel raises with symmetrical weightbearing to demonstrate improved strength to ADLs. Eval: 0 without left weight shift  Pt will improve her FOTO to 49, demonstrating improved functional ADL capacity.   Eval: 31    PLAN  []  Upgrade activities as tolerated     [x]  Continue plan of care  []  Update interventions per flow sheet       []  Discharge due to:_  []  Other:_      aQmar Gil, PT 12/5/2022  1:48 PM    Future Appointments   Date Time Provider Archana Carson   1/4/2023 10:15 AM Jaiden Ashley MD [facility] BS AMB

## 2022-12-05 NOTE — PROGRESS NOTES
[facility]  [address]  [place], [address]  [phone] fax    Plan of Care/ Statement of Necessity for Physical Therapy Services    Patient name: Osbaldo Styles Start of Care: 2022   Referral source: Vandana Mena MD : 1980    Medical Diagnosis: Foot drop, right foot [M21.371]  Pain in right leg [M79.604]  Pain in right ankle and joints of right foot [M25.571]  Payor: Luisana [place] / Plan: [address] HMO / Product Type: HMO /  Onset Date:2022    Treatment Diagnosis: right foot pain   Prior Hospitalization: see medical history Provider#: [id number]   Medications: Verified on Patient summary List    Comorbidities: 2018 right peroneal tendon repair, arthritis, allergies, back pain, depression, GI dysfunction   Prior Level of Function: some minor ankle discomfort but no drop foot or severe pain with weightbearing     The Plan of Care and following information is based on the information from the initial evaluation. Assessment/ key information: Pt is a 70-year-old woman presenting to the clinic with right ankle/foot pain beginning in 2022. She initially noticed an issue with the ankle when she experienced her \"foot stopped moving,\" describing a drop foot. Imaging showed an ATFL tear without other remarkable findings. Pain is present at the lateral ankle shooting up the lateral lower leg as well as the medial arch of the foot. She does have pain with PA compression to L4-5. She presents to the clinic wearing an aircast, stating she will at times still wear her CAM boot with the aircast in public to make the ankle not hurt. Educated pt that this may be too much compression and can exacerbate nerve irritation. She denies any numbness, tingling, or burning. She is unable to perform a heel raise without full weight shift onto the left LE.  Impairments include reduced right ankle/foot ROM, resting DF toe posture, decreased right LE strength, decreased gastroc and tibialis anterior flexibility, and reduced tolerance to weightbearing. Signs and sx consistent with she appears to have some peroneal TTP, ATFL TTP, and the beginnings of a right foot plantar fasciitis. Pt will benefit from skilled PT services to address the aforementioned impairments and improve ADL ease. Evaluation Complexity History MEDIUM  Complexity : 1-2 comorbidities / personal factors will impact the outcome/ POC ; Examination MEDIUM Complexity : 3 Standardized tests and measures addressing body structure, function, activity limitation and / or participation in recreation  ;Presentation MEDIUM Complexity : Evolving with changing characteristics  ; Clinical Decision Making MEDIUM Complexity : FOTO score of 26-74  Overall Complexity Rating: MEDIUM  Problem List: pain affecting function, decrease ROM, decrease strength, edema affecting function, impaired gait/ balance, decrease ADL/ functional abilitiies, decrease activity tolerance, decrease flexibility/ joint mobility, and decrease transfer abilities   Treatment Plan may include any combination of the following: Therapeutic exercise, Neuromuscular reeducation, Manual therapy, Therapeutic activity, Self care/home management, Electric stim unattended , Vasopneumatic device, Aquatic therapy, Gait training, Ultrasound, and Electric stim attended  Patient / Family readiness to learn indicated by: asking questions, trying to perform skills, and interest  Persons(s) to be included in education: patient (P)  Barriers to Learning/Limitations: None  Patient Goal (s): make pain & inability to do regular things  Patient Self Reported Health Status: good  Rehabilitation Potential: good    Short Term Goals: To be accomplished in 2 weeks:  Pt will report daily HEP compliance to maximize ease of self care. Pt will perform 10 SLR without quad lag, indicating improved right LE NM control for ambulation. Long Term Goals:  To be accomplished in 6 weeks:  Pt will improve her right ankle dorsiflexion to 5 degrees to improve ease of ambulation. Pt will improve her ankle eversion to 25 deg to improve ankle mobility with ambulation. Pt will improve her right ankle strength to 4/5 MMT to improve stability with standing. Pt will perform 10 heel raises with symmetrical weightbearing to demonstrate improved strength to ADLs. Pt will improve her FOTO to 49, demonstrating improved functional ADL capacity. Frequency / Duration: Patient to be seen 2 times per week for 6 weeks. Patient/ Caregiver education and instruction: Diagnosis, prognosis, self care, activity modification, brace/ splint application, and exercises   [x]  Plan of care has been reviewed with DONNA Wooten, PT 12/5/2022 1:20 PM    ________________________________________________________________________    I certify that the above Therapy Services are being furnished while the patient is under my care. I agree with the treatment plan and certify that this therapy is necessary.     Physician's Signature:____________Date:_________TIME:________     Ernesto Boggs MD  ** Signature, Date and Time must be completed for valid certification **    Please sign and return to [facility] [address] [place], [place] [address] [address] [place] [address] [address]  [phone] fax

## 2022-12-12 ENCOUNTER — HOSPITAL ENCOUNTER (OUTPATIENT)
Dept: PHYSICAL THERAPY | Age: 42
Discharge: HOME | End: 2022-12-12
Attending: ORTHOPAEDIC SURGERY
Payer: COMMERCIAL

## 2022-12-12 PROCEDURE — 97140 MANUAL THERAPY 1/> REGIONS: CPT

## 2022-12-12 PROCEDURE — 97112 NEUROMUSCULAR REEDUCATION: CPT

## 2022-12-12 PROCEDURE — 97110 THERAPEUTIC EXERCISES: CPT

## 2022-12-12 NOTE — PROGRESS NOTES
PT DAILY TREATMENT NOTE     Patient Name: Kaitlin Quach  Date:2022  : 1980  [x]  Patient  Verified  Payor: Bettye Kitchen / Plan: [address] HMO / Product Type: HMO /    In time:7:00  Out time:7:36  Total Treatment Time (min): 36  Visit #: 2 of 12    Treatment Area: Pain in right ankle [M25.571]    SUBJECTIVE  Pain Level (0-10 scale): 2-3  Any medication changes, allergies to medications, adverse drug reactions, diagnosis change, or new procedure performed?: [x] No    [] Yes (see summary sheet for update)  Subjective functional status/changes:   [] No changes reported  Pt states she had been doing the home exercises and they are pretty difficult    OBJECTIVE    20 min Therapeutic Exercise:  [x] See flow sheet :   Rationale: increase ROM and increase strength to improve the patients ability to perform ADLs    8 min Neuromuscular Re-education:  [x]  See flow sheet :   Rationale: increase strength, improve coordination, and increase proprioception  to improve the patients ability to perform functional     8 min Manual Therapy:  right TC jt mobs, manual DF str, DTM to peroneals    The manual therapy interventions were performed at a separate and distinct time from the therapeutic activities interventions. Rationale: decrease pain, increase ROM, and increase tissue extensibility to improve functional mobility          With   [] TE   [] TA   [] neuro   [] other: Patient Education: [x] Review HEP    [] Progressed/Changed HEP based on:   [] positioning   [] body mechanics   [] transfers   [] heat/ice application    [] other:      Other Objective/Functional Measures:   First visit after Eval     Pain Level (0-10 scale) post treatment: 2    ASSESSMENT/Changes in Function: Initiated treatment program per flow sheet. Pt performed standing therex with aircast. Challenged with BAPs, especially with later movements. TTP @ peroneals. PD modalities following treatment and reports ankle feels \"different\". Continue progressing as tolerated. Patient will continue to benefit from skilled PT services to modify and progress therapeutic interventions, address functional mobility deficits, address ROM deficits, address strength deficits, analyze and address soft tissue restrictions, analyze and cue movement patterns, analyze and modify body mechanics/ergonomics, assess and modify postural abnormalities, and address imbalance/dizziness to attain remaining goals. []  See Plan of Care  []  See progress note/recertification  []  See Discharge Summary         Progress towards goals / Updated goals:  Short Term Goals: To be accomplished in 2 weeks:  Pt will report daily HEP compliance to maximize ease of self care. Eval: HEP assigned  Current: Met, pt reports compliance with HEP 12/12/2022  Pt will perform 10 SLR without quad lag, indicating improved right LE NM control for ambulation. Eval: quad lag  Long Term Goals: To be accomplished in 6 weeks:  Pt will improve her right ankle dorsiflexion to 5 degrees to improve ease of ambulation. Eval: lacking 10 deg from neutral  Pt will improve her ankle eversion to 25 deg to improve ankle mobility with ambulation. Eval: 15 deg  Pt will improve her right ankle strength to 4/5 MMT to improve stability with standing. Eval: 2+/5  Pt will perform 10 heel raises with symmetrical weightbearing to demonstrate improved strength to ADLs. Eval: 0 without left weight shift  Pt will improve her FOTO to 49, demonstrating improved functional ADL capacity.   Eval: 31    PLAN  []  Upgrade activities as tolerated     [x]  Continue plan of care  []  Update interventions per flow sheet       []  Discharge due to:_  []  Other:_      Rosalind Mack PTA 12/12/2022  7:05 AM    Future Appointments   Date Time Provider Archana Carson   12/14/2022  7:00 AM Kristina Frank, PTA [facility]   12/19/2022  3:00 PM Stephanie Addison PT [facility]   12/21/2022  3:00 PM Sharon Louie [facility]   1/4/2023 10:15 AM Elder Ashley MD [facility] BS AMB

## 2022-12-14 ENCOUNTER — TELEPHONE (OUTPATIENT)
Dept: PHYSICAL THERAPY | Age: 42
End: 2022-12-14

## 2022-12-19 ENCOUNTER — TELEPHONE (OUTPATIENT)
Dept: PHYSICAL THERAPY | Age: 42
End: 2022-12-19

## 2022-12-21 ENCOUNTER — TELEPHONE (OUTPATIENT)
Dept: PHYSICAL THERAPY | Age: 42
End: 2022-12-21

## 2023-01-04 NOTE — PROGRESS NOTES
[facility]  [address] [facility] [address]  [place], [address]  [phone] fax    Physical Therapy Discharge Summary  Patient name: Lionel Navarrete Start of Care: 2022   Referral source: Tabatha Mario MD : 1980   Medical/Treatment Diagnosis: Pain in right ankle [M25.571]  Payor: Hieu Jobs / Plan: Radha R [employer] HMO / Product Type: HMO /  Onset Date:2022     Prior Hospitalization: see medical history Provider#: [id number]   Medications: Verified on Patient Summary List    Comorbidities: 2018 right peroneal tendon repair, arthritis, allergies, back pain, depression, GI dysfunction   Prior Level of Function: some minor ankle discomfort but no drop foot or severe pain with weightbearing  Visits from Start of Care: 2    Missed Visits: 3  Reporting Period : 2022 to 2022    Summary of Care:  Short Term Goals: To be accomplished in 2 weeks:  Pt will report daily HEP compliance to maximize ease of self care. Eval: HEP assigned  Current: Met, pt reports compliance with HEP 2022  Pt will perform 10 SLR without quad lag, indicating improved right LE NM control for ambulation. Eval: quad lag  Long Term Goals: To be accomplished in 6 weeks:  Pt will improve her right ankle dorsiflexion to 5 degrees to improve ease of ambulation. Eval: lacking 10 deg from neutral  Pt will improve her ankle eversion to 25 deg to improve ankle mobility with ambulation. Eval: 15 deg  Pt will improve her right ankle strength to 4/5 MMT to improve stability with standing. Eval: 2+/5  Pt will perform 10 heel raises with symmetrical weightbearing to demonstrate improved strength to ADLs. Eval: 0 without left weight shift  Pt will improve her FOTO to 49, demonstrating improved functional ADL capacity. Eval: 31      ASSESSMENT/RECOMMENDATIONS:  Pt attended 1 PT follow-up on 2022 and has not returned to care. D/c without goal reassessment or PT instructions. Thank you for this referral!     [x]Discontinue therapy: []Patient has reached or is progressing toward set goals      [x]Patient is non-compliant or has abdicated      []Due to lack of appreciable progress towards set goals    Marlon White, PT 1/4/2023 12:45 PM

## 2023-09-29 NOTE — PROGRESS NOTES
AMBULATORY PROGRESS NOTE      Patient: Shad Christianson             MRN: [id number]     SSN: [id number] Body mass index is 36.18 kg/m². YOB: 1980     AGE: 45 y.o. EX: female    PCP: Cuco Juarez MD     IMPRESSION/DIAGNOSIS AND TREATMENT PLAN     DIAGNOSES  1. Tear of peroneal tendon, right, subsequent encounter    2. Severe obesity ([address]        Orders Placed This Encounter    diclofenac (VOLTAREN) 1 % gel      Shad Christianson understands her diagnoses and the proposed plan. Plan:    1) HEP w/ Rolando Christina ATC, has educated the patient on ROM exercises and/or stretches. 2) Voltaren 1% gel: 4 g BID; 100 g, 1 refill. 3) Continue activity as tolerated. RTO - 6 weeks     HPI AND EXAMINATION     Shad Christianson IS A 45 y.o. female who presents to my outpatient office for follow up 12 months s/p:      1. RIGHT PERONEAL BREVIS AND LONGUS TENDON TENOSYNOVECTOMIES   2. PRIMARY REPAIR OF PERONEAL BREVIS TENDON  3. EXCISION OF BUTTON HOLE CENTRAL PERONEAL TENDON TEAR     At the last visit, I provided an HEP, provided a work note, instructed the patient to continue activity modification as directed, and to perform ROM exercises with the great toe. Since we saw her last, Ms. Del Parrish states that she is still experiencing swelling in her right ankle along her peroneal tendon. She does believe that surgery helped with her pain and swelling. She reports that she gained weight after the surgery and believes that this may be contributing to her persistent pain and swelling in her right ankle. She reports that she has started exercising with a . The patient works at [employer]. Visit Vitals  /53 (BP 1 Location: Left arm, BP Patient Position: Sitting)   Pulse 65   Temp 98 °F (36.7 °C) (Oral)   Resp 16   Ht 5' 9\" (1.753 m)   Wt 245 lb (111.1 kg)   SpO2 98%   BMI 36.18 kg/m²      GEN:  Alert, well developed, well nourished, well appearing 45 y. o. female in no acute distress. PSYCH:  Normal affect, mood, and conduct. alert, oriented x 3 alert, oriented x 3, no dementia  M/S EXAMINATION OF: right foot/ankle  INCISION: well healed surgical scar with slight hypertrophy and hyperpigmentation along surgical incision  SKIN: Mild swelling posterolaterally, no erythema, no ecchymosis, no warmth  TENDERNESS:  mild tenderness to palpation along lateral ankle         Mild tenderness to the great toe metatarsal  NEUROVASCULAR:  grossly intact. Positive distal pulses and capillary refill. DVT ASSESSMENT:  The calf is not tender to palpation. No evidence of DVT seen on physical exam.  ROM: improving with limitation noted              Mild stiffness with inversion, lacks full inversion              Full eversion              30 degrees of PF              DF to neutral   Great toe extends to just about neutral    CHART REVIEW     Past Medical History:   Diagnosis Date    Chronic pain     knees and ankles    GERD (gastroesophageal reflux disease)     during pregancy    Headache     Hemorrhoid     History of body piercing     Hypercholesterolemia     IUD (intrauterine device) in place     Migraine headache     SIFUENTES (nonalcoholic steatohepatitis) (per outside records) 6/11/2017    denies liver disease 5/22/2018    Tendinitis      Current Outpatient Medications   Medication Sig    diclofenac (VOLTAREN) 1 % gel Apply 4 g to affected area two (2) times a day.  naproxen (NAPROSYN) 500 mg tablet Take 1 Tab by mouth two (2) times daily as needed.  HYDROcodone-acetaminophen (NORCO) 7.5-325 mg per tablet TAKE ONE TO TWO TABLETS BY MOUTH Q 4 - 6 HRS PRN PAIN **AFTER SURGERY**  Indications: Pain    promethazine (PHENERGAN) 25 mg tablet Take 1 Tab by mouth every six (6) hours as needed for Nausea.  polyethylene glycol (MIRALAX) 17 gram packet Take 1 Packet by mouth daily. Mix in 8 oz prune juice    loratadine (CLARITIN) 10 mg tablet Take 10 mg by mouth daily as needed for Allergies.     triamcinolone (NASACORT) 55 mcg nasal inhaler 2 Sprays daily as needed.  multivitamin (ONE A DAY) tablet Take 1 Tab by mouth daily.  cholecalciferol, VITAMIN D3, (VITAMIN D3) 5,000 unit tab tablet Take  by mouth every Wednesday. No current facility-administered medications for this visit. Allergies   Allergen Reactions    Pcn [Penicillins] Hives, Swelling and Other (comments)     Throat closes    Shellfish Derived Hives, Swelling and Other (comments)     Throat closes     Past Surgical History:   Procedure Laterality Date    FOOT/TOES SURGERY PROC UNLISTED      HX OTHER SURGICAL      Tooth extraction (molars)     Social History     Occupational History    Not on file   Tobacco Use    Smoking status: Never Smoker    Smokeless tobacco: Never Used   Substance and Sexual Activity    Alcohol use: Yes     Comment: rare    Drug use: No    Sexual activity: Not on file     Family History   Problem Relation Age of Onset    Diabetes Mother     Hypertension Mother    [facility] Bipolar Disorder Mother     Elevated Lipids Father     COPD Maternal Grandmother     Emphysema Maternal Grandmother     Lung Disease Maternal Grandmother     No Known Problems Maternal Grandfather     Stroke Paternal Grandmother     No Known Problems Paternal Grandfather     Allergic Rhinitis Daughter     Eczema Daughter     Headache Daughter         REVIEW OF SYSTEMS : 6/5/2019  ALL BELOW ARE Negative except : SEE HPI     Constitutional: Negative for fever, chills and weight loss. Neg Weight Loss  Cardiovascular: Negative for chest pain, claudication and leg swelling. SOB, GR   Gastrointestinal/Urological: Negative for  pain, N/V/D/C, Blood in stool or urine,dysuria                         Hematuria, Incontinence, pelvic pain  Musculoskeletal: see HPI.   Neurological: Negative for dizziness and weakness, headaches,Visual Changes             Confusion,  Or Seizures,   Psychiatric/Behavioral: Negative for depression, memory loss and substance abuse. Extremities:  Negative for hair changes, rash or skin lesion changes. Hematologic: Negative for Bleeding problems, bruising, pallor or swollen lymph nodes. Peripheral Vascular: No calf pain, vascular vein tenderness to calf pain              No calf throbbing, posterior knee throbbing pain     DIAGNOSTIC IMAGING     No notes on file    Please see above section of this report. I have personally reviewed the results of the above study. The interpretation of this study is my professional opinion. Written by Maddi Martinez, as dictated by Dr. Prince Marte. I, Dr. Prince Marte, confirm that all documentation is accurate. Standing/Walking

## 2024-01-24 NOTE — PROGRESS NOTES
[facility]  [address]  [place], [address]  [phone] fax    Plan of Care/ Statement of Necessity for Physical Therapy Services     Patient name: Annamarie Nelson Start of Care: 2018   Referral source: Primitivo Quintanilla MD : 1980    Medical Diagnosis: Peroneal tendinitis, right leg [M76.71]  Other specified postprocedural states [Z98.890] Onset Date:2018, DOS 2018   Treatment Diagnosis: s/p R peroneus brevis repair   Prior Hospitalization: see medical history Provider#: [id number]   Medications: Verified on Patient summary List    Comorbidities: unremarkable per pt report   Prior Level of Function: active as a , void of limitations with ambulation     The Plan of Care and following information is based on the information from the initial evaluation. Assessment/ key information: Pt is a 40year old female who reports spraining her ankle in 2018 with subsequent chronic tendonitis and an apparent peroneal tendon tear that was discovered after multiple referrals. She is now s/p surgical peroneus brevis repair and tenosynovectomy on 2018. She reports she has been NWB in her CAM boot with B axillary crutches since and was just released to now be WBAT. She presents with poor R ankle, foot, and 1st MTP ROM in all planes, poor R ankle strength, gross R ankle & foot circumferential edema, limited R hip and knee strength, and limited ambulation tolerance utilizing axillary crutches and CAM boot. Pt will benefit from skilled PT management to address their impairments and improve their level of function.     Evaluation Complexity History MEDIUM  Complexity : 1-2 comorbidities / personal factors will impact the outcome/ POC ; Examination HIGH Complexity : 4+ Standardized tests and measures addressing body structure, function, activity limitation and / or participation in recreation  ;Presentation LOW Recorded Pressure: LVEDP, HR=97, Condition=Condition 1 (Left Ventricle) LVEDP 179/-5/50 Complexity : Stable, uncomplicated  ;Clinical Decision Making MEDIUM Complexity : FOTO score of 26-74  Overall Complexity Rating: LOW   Problem List: pain affecting function, decrease ROM, decrease strength, edema affecting function, impaired gait/ balance, decrease ADL/ functional abilitiies, decrease activity tolerance, decrease flexibility/ joint mobility and decrease transfer abilities   Treatment Plan may include any combination of the following: Therapeutic exercise, Therapeutic activities, Neuromuscular re-education, Physical agent/modality, Gait/balance training, Manual therapy, Aquatic therapy, Patient education and Self Care training  Patient / Family readiness to learn indicated by: asking questions, trying to perform skills and interest  Persons(s) to be included in education: patient (P)  Barriers to Learning/Limitations: None  Patient Goal (s): I hope to walk and regain full function of my leg/foot.   Patient Self Reported Health Status: good  Rehabilitation Potential: good    Short Term Goals: To be accomplished in 2 weeks:   1. Patient will report performance of HEP at least 2 times per day to facilitate improved outcomes and improved self management. 2. Pt will demonstrate R ankle PROM to neutral DF, PF of 50 or better to improve ease of WB activity and ambulation. 3.Pt will demonstrate ability to ambulate with FWB in CAM boot void of axillary crutches to facilitate progression towards normalized ambulation and functional WB activity. Long Term Goals: To be accomplished in 6 weeks:   1. Patient will report FOTO score of 61 or better to indicate significant improvement in functional status. 2. Pt will demonstrate 10 degrees R ankle DF PROM to facilitate normal gait mechanics. 3. Pt will demonstrate Ability to ambulate for 300 feet with normal gait mechanics to improve ease of community ambulation.    4. Pt will demonstrate 4/5 R ankle strength in all planes to improve stability during ambulation. Frequency / Duration: Patient to be seen 2 times per week for 4 weeks. Patient/ Caregiver education and instruction: Diagnosis, prognosis, self care, activity modification and exercises   [x]  Plan of care has been reviewed with DONNA Sarabia PT, DPT, ATC 7/20/2018 2:06 PM    ________________________________________________________________________    I certify that the above Therapy Services are being furnished while the patient is under my care. I agree with the treatment plan and certify that this therapy is necessary.     [address] [place] [address] Signature:____________________  Date:____________Time: _________    Please sign and return to [facility] [address] [address] [facility] [address] [place] [address], [facility] [address]  [phone] fax

## (undated) DEVICE — PACK PROCEDURE SURG MAJ W/ BASIN LF

## (undated) DEVICE — SOLUTION IV 1000ML 0.9% SOD CHL

## (undated) DEVICE — VESSEL LOOPS,MAXI, RED: Brand: DEVON

## (undated) DEVICE — INTENDED FOR TISSUE SEPARATION, AND OTHER PROCEDURES THAT REQUIRE A SHARP SURGICAL BLADE TO PUNCTURE OR CUT.: Brand: BARD-PARKER SAFETY BLADES SIZE 10, STERILE

## (undated) DEVICE — SUTURE PLN GUT SZ 3-0 L27IN ABSRB YELLOWISH TAN L36MM CT-1 842H

## (undated) DEVICE — NEEDLE SPNL 22GA L3.5IN BLK HUB S STL REG WALL FIT STYL W/

## (undated) DEVICE — SUTURE VCRL SZ 3-0 L27IN ABSRB UD L26MM SH 1/2 CIR J416H

## (undated) DEVICE — FLEX ADVANTAGE 3000CC: Brand: FLEX ADVANTAGE

## (undated) DEVICE — SOLUTION SCRB 4OZ 4% CHG CLN BASE FOR PT SKIN ANTISEPSIS

## (undated) DEVICE — STERILE POLYISOPRENE POWDER-FREE SURGICAL GLOVES: Brand: PROTEXIS

## (undated) DEVICE — OCCLUSIVE GAUZE STRIP,3% BISMUTH TRIBROMOPHENATE IN PETROLATUM BLEND: Brand: XEROFORM

## (undated) DEVICE — KENDALL SCD EXPRESS SLEEVES, KNEE LENGTH, MEDIUM: Brand: KENDALL SCD

## (undated) DEVICE — DRAPE,REIN 53X77,STERILE: Brand: MEDLINE

## (undated) DEVICE — (D)SYR 10ML 1/5ML GRAD NSAF -- PKGING CHANGE USE ITEM 338027

## (undated) DEVICE — INTENDED TO BE USED TO OCCLUDE, RETRACT AND IDENTIFY ARTERIES, VEINS, TENDONS AND NERVES IN SURGICAL PROCEDURES: Brand: STERION®  VESSEL LOOP

## (undated) DEVICE — BANDAGE COMPR 9 FTX4 IN SMOOTH COMFORTABLE SYNTH ESMRK LF

## (undated) DEVICE — 3M™ BAIR PAWS FLEX™ WARMING GOWN, STANDARD, 20 PER CASE 81003: Brand: BAIR PAWS™

## (undated) DEVICE — GAUZE SPONGES,USP TYPE VII GAUZE, 12 PLY: Brand: CURITY

## (undated) DEVICE — DRAPE TWL SURG 16X26IN BLU ORB04] ALLCARE INC]

## (undated) DEVICE — DISPOSABLE TOURNIQUET CUFF SINGLE BLADDER, DUAL PORT AND QUICK CONNECT CONNECTOR: Brand: COLOR CUFF

## (undated) DEVICE — DERMACEA GAUZE ROLL: Brand: DERMACEA

## (undated) DEVICE — Device

## (undated) DEVICE — LIGHT HANDLE: Brand: DEVON

## (undated) DEVICE — INTENDED FOR TISSUE SEPARATION, AND OTHER PROCEDURES THAT REQUIRE A SHARP SURGICAL BLADE TO PUNCTURE OR CUT.: Brand: BARD-PARKER SAFETY BLADES SIZE 15, STERILE

## (undated) DEVICE — STOCKINETTE,IMPERVIOUS,12X48,STERILE: Brand: MEDLINE

## (undated) DEVICE — KERLIX BANDAGE ROLL: Brand: KERLIX

## (undated) DEVICE — 3M™ STERI-STRIP™ REINFORCED ADHESIVE SKIN CLOSURES, R1546, 1/4 IN X 4 IN (6 MM X 100 MM), 10 STRIPS/ENVELOPE: Brand: 3M™ STERI-STRIP™

## (undated) DEVICE — (D)PREP SKN CHLRAPRP APPL 26ML -- CONVERT TO ITEM 371833

## (undated) DEVICE — SOFT SILICONE HYDROCELLULAR SACRUM DRESSING WITH LOCK AWAY LAYER: Brand: ALLEVYN LIFE SACRUM (LARGE) PACK OF 10

## (undated) DEVICE — REM POLYHESIVE ADULT PATIENT RETURN ELECTRODE: Brand: VALLEYLAB

## (undated) DEVICE — Z DISCONTINUED BY MEDLINE USE 2711682 TRAY SKIN PREP DRY W/ PREM GLV

## (undated) DEVICE — SUTURE ETHLN SZ 3-0 L30IN NONABSORBABLE BLK L30MM PSLX 3/8 1691H

## (undated) DEVICE — DRAPE,EXTREMITY,89X128,STERILE: Brand: MEDLINE

## (undated) DEVICE — DRAPE,U/SHT,SPLIT,FILM,60X84,STERILE: Brand: MEDLINE

## (undated) DEVICE — 3M™ STERI-STRIP™ COMPOUND BENZOIN TINCTURE 40 BAGS/CARTON 4 CARTONS/CASE C1544: Brand: 3M™ STERI-STRIP™